# Patient Record
Sex: MALE | Race: WHITE | NOT HISPANIC OR LATINO | Employment: OTHER | URBAN - NONMETROPOLITAN AREA
[De-identification: names, ages, dates, MRNs, and addresses within clinical notes are randomized per-mention and may not be internally consistent; named-entity substitution may affect disease eponyms.]

---

## 2023-06-27 PROBLEM — G89.29 OTHER CHRONIC PAIN: Status: ACTIVE | Noted: 2023-06-27

## 2023-06-27 PROBLEM — I10 PRIMARY HYPERTENSION: Status: ACTIVE | Noted: 2023-06-27

## 2023-06-27 PROBLEM — N40.0 BENIGN PROSTATIC HYPERPLASIA WITHOUT LOWER URINARY TRACT SYMPTOMS: Status: ACTIVE | Noted: 2023-06-27

## 2023-06-27 PROBLEM — M05.79 RHEUMATOID ARTHRITIS INVOLVING MULTIPLE SITES WITH POSITIVE RHEUMATOID FACTOR: Status: ACTIVE | Noted: 2023-06-27

## 2023-07-13 ENCOUNTER — TELEPHONE (OUTPATIENT)
Dept: INTERNAL MEDICINE | Facility: CLINIC | Age: 58
End: 2023-07-13

## 2023-07-13 NOTE — TELEPHONE ENCOUNTER
Caller: DAVID RAI    Relationship: Emergency Contact    Best call back number: [phone]    What is the best time to reach you: ANYTIME    Who are you requesting to speak with (clinical staff, provider,  specific staff member): CLINICAL    What was the call regarding: STATES THAT PATIENT HAD A DRUG TEST FOR WHAT HE'S TAKING OVER THE COUNTER FOR HIS WORKOUT. WANTED TO SEND IMAGES OF MEDICATIONS HE'S TAKING OVER [employer], BUT DOES NOT HAVE OPTION TO. WANTED TO LOOK INTO OTHER ALTERNATIVES    Is it okay if the provider responds through [facility]: YES

## 2023-08-07 ENCOUNTER — TELEPHONE (OUTPATIENT)
Dept: INTERNAL MEDICINE | Facility: CLINIC | Age: 58
End: 2023-08-07

## 2023-08-07 DIAGNOSIS — Z79.899 ENCOUNTER FOR LONG-TERM (CURRENT) USE OF OTHER MEDICATIONS: Primary | ICD-10-CM

## 2023-08-07 NOTE — TELEPHONE ENCOUNTER
Caller: Lars Rand    Relationship: Self    Best call back number: [phone]     What form or medical record are you requesting: LETTER STATING WHY HE FAILED HIS DRUG SCREEN     Who is requesting this form or medical record from you: PATIENT    How would you like to receive the form or medical records (pick-up, mail, fax): FAX TO PAIN MANAGEMENT ([facility])  [phone]    Timeframe paperwork needed: ASAP    Additional notes: PATIENT STATED THAT PAIN MANAGEMENT WOULDN'T SCHEDULE HIS APPT UNTIL THEY HAD A LETTER HEAD NOTE SAYING WHY HE FAILED HIS DRUG TEST, PATIENT STATED HE FAILED DUE TO HIS ALLERGY AND COLD MEDICATION. PATIENT IS REQUESTING THIS BE DONE ASAP BECAUSE HE IS OUT OF MEDICATION.

## 2023-08-21 DIAGNOSIS — M05.79 RHEUMATOID ARTHRITIS INVOLVING MULTIPLE SITES WITH POSITIVE RHEUMATOID FACTOR: ICD-10-CM

## 2023-08-21 DIAGNOSIS — G89.29 OTHER CHRONIC PAIN: ICD-10-CM

## 2023-08-21 RX ORDER — OXYCODONE AND ACETAMINOPHEN 7.5; 325 MG/1; MG/1
1 TABLET ORAL EVERY 12 HOURS PRN
Qty: 60 TABLET | Refills: 0 | Status: SHIPPED | OUTPATIENT
Start: 2023-08-21

## 2023-08-22 DIAGNOSIS — G47.39 MIXED SLEEP APNEA: Primary | ICD-10-CM

## 2023-08-30 ENCOUNTER — TELEPHONE (OUTPATIENT)
Dept: NEUROLOGY | Age: 58
End: 2023-08-30

## 2023-08-30 NOTE — TELEPHONE ENCOUNTER
Received a referral for this patient. Called and left patient a VM to call our office back to where we can get him scheduled an appointment with Dr. Karlie Kumar.

## 2023-09-01 ENCOUNTER — TRANSCRIBE ORDERS (OUTPATIENT)
Dept: ADMINISTRATIVE | Facility: HOSPITAL | Age: 58
End: 2023-09-01
Payer: MEDICAID

## 2023-09-01 ENCOUNTER — HOSPITAL ENCOUNTER (OUTPATIENT)
Dept: GENERAL RADIOLOGY | Facility: HOSPITAL | Age: 58
Discharge: HOME | End: 2023-09-01
Admitting: PAIN MEDICINE
Payer: MEDICAID

## 2023-09-01 DIAGNOSIS — M47.812 CERVICAL SPONDYLOSIS WITHOUT MYELOPATHY: ICD-10-CM

## 2023-09-01 DIAGNOSIS — M47.812 CERVICAL SPONDYLOSIS WITHOUT MYELOPATHY: Primary | ICD-10-CM

## 2023-09-01 PROCEDURE — 72050 X-RAY EXAM NECK SPINE 4/5VWS: CPT

## 2023-10-12 ENCOUNTER — OFFICE VISIT (OUTPATIENT)
Dept: INTERNAL MEDICINE | Facility: CLINIC | Age: 58
End: 2023-10-12
Payer: MEDICAID

## 2023-10-12 VITALS
HEIGHT: 67 IN | TEMPERATURE: 97.1 F | HEART RATE: 73 BPM | BODY MASS INDEX: 30.35 KG/M2 | WEIGHT: 193.4 LBS | OXYGEN SATURATION: 96 % | DIASTOLIC BLOOD PRESSURE: 72 MMHG | SYSTOLIC BLOOD PRESSURE: 130 MMHG

## 2023-10-12 DIAGNOSIS — Z00.00 ENCOUNTER FOR ANNUAL PHYSICAL EXAM: Primary | ICD-10-CM

## 2023-10-12 DIAGNOSIS — G89.29 CHRONIC BILATERAL LOW BACK PAIN, UNSPECIFIED WHETHER SCIATICA PRESENT: ICD-10-CM

## 2023-10-12 DIAGNOSIS — G47.39 MIXED SLEEP APNEA: ICD-10-CM

## 2023-10-12 DIAGNOSIS — M54.50 CHRONIC BILATERAL LOW BACK PAIN, UNSPECIFIED WHETHER SCIATICA PRESENT: ICD-10-CM

## 2023-10-12 DIAGNOSIS — Z12.11 COLON CANCER SCREENING: ICD-10-CM

## 2023-10-12 DIAGNOSIS — Z79.52 LONG TERM SYSTEMIC STEROID USER: ICD-10-CM

## 2023-10-12 DIAGNOSIS — I10 PRIMARY HYPERTENSION: ICD-10-CM

## 2023-10-12 DIAGNOSIS — M05.79 RHEUMATOID ARTHRITIS INVOLVING MULTIPLE SITES WITH POSITIVE RHEUMATOID FACTOR: ICD-10-CM

## 2023-10-12 PROCEDURE — 1160F RVW MEDS BY RX/DR IN RCRD: CPT

## 2023-10-12 PROCEDURE — 1159F MED LIST DOCD IN RCRD: CPT

## 2023-10-12 PROCEDURE — 3075F SYST BP GE 130 - 139MM HG: CPT

## 2023-10-12 PROCEDURE — 99396 PREV VISIT EST AGE 40-64: CPT

## 2023-10-12 PROCEDURE — 3078F DIAST BP <80 MM HG: CPT

## 2023-10-12 RX ORDER — TIZANIDINE HYDROCHLORIDE 4 MG/1
4 CAPSULE, GELATIN COATED ORAL 3 TIMES DAILY
Qty: 42 CAPSULE | Refills: 0 | Status: SHIPPED | OUTPATIENT
Start: 2023-10-12 | End: 2023-10-26

## 2023-10-12 NOTE — PROGRESS NOTES
Subjective   Lars Rand is a 58 y.o. male.   Chief Complaint   Patient presents with    Annual Exam     Labs printed       History of Present Illness   Mr. Rand presents to the office today for his annual exam  He states since he was seen here last he has successfully weaned himself completely off of the prednisone.  He states his joints do hurt worse because of this however he has noted great improvement in his facial fat/edema, he states he never wants to get put back on steroids again.  Unfortunately, he is unable to get in with his rheumatologist again until January so he is not currently on anything for his rheumatoid.  He has been established with [facility], he states he has been very pleased with their care thus far, he states he is involved with physical therapy along with the Percocet that he was on previously for management of his chronic pain.  He states he has an MRI of his spine ordered on the 17th, he anticipates likely getting injections in his lower back after that.  He did recently complete a sleep study which did show some signs concerning for central sleep apnea as well as THU, he does have a CPAP machine now, states he is planning on returning it however because he cannot sleep with it on, states the mask always falls off.  No reports of any chest pain or shortness of breath.  He is requesting a form for a handicap place card, he states that at times his joints are so stiff and achy he cannot walk long distances and would appreciate being able to use this.  He denies any family history of colon cancer, states he would prefer to do Cologuard or colonoscopy for screening purposes.  He declines all vaccines, states his family always gets very sick when they get vaccines, he prefers to go without.  The following portions of the patient's history were reviewed and updated as appropriate: allergies, current medications, past family history, past medical history, past social  "history, past surgical history and problem list.    Review of Systems    Objective   Past Medical History:   Diagnosis Date    Arthritis     Chest pain     GERD (gastroesophageal reflux disease)     Heart attack     Hypertension     Palpitations     Rosie-Parkinson-White (WPW) pattern       History reviewed. No pertinent surgical history.     Current Outpatient Medications:     atenolol (Tenormin) 25 MG tablet, Take 1 tablet by mouth Daily., Disp: 90 tablet, Rfl: 1    diclofenac (VOLTAREN) 75 MG EC tablet, Take 1 tablet by mouth 2 (Two) Times a Day., Disp: 90 tablet, Rfl: 1    folic acid (FOLVITE) 1 MG tablet, Take 1 tablet by mouth Daily., Disp: 90 tablet, Rfl: 2    oxyCODONE-acetaminophen (PERCOCET) 7.5-325 MG per tablet, Take 1 tablet by mouth 2 (Two) Times a Day As Needed for Moderate Pain., Disp: , Rfl:     TiZANidine (ZANAFLEX) 4 MG capsule, Take 1 capsule by mouth 3 (Three) Times a Day for 14 days., Disp: 42 capsule, Rfl: 0      /72 (BP Location: Right arm, Patient Position: Sitting, Cuff Size: Adult)   Pulse 73   Temp 97.1 øF (36.2 øC) (Temporal)   Ht 170.2 cm (67\")   Wt 87.7 kg (193 lb 6.4 oz)   SpO2 96%   BMI 30.29 kg/mý      Body mass index is 30.29 kg/mý.          Physical Exam  Vitals and nursing note reviewed.   Constitutional:       General: He is not in acute distress.     Appearance: Normal appearance. He is overweight. He is not ill-appearing, toxic-appearing or diaphoretic.   HENT:      Head: Normocephalic and atraumatic.      Right Ear: There is impacted cerumen.      Left Ear: There is impacted cerumen.      Nose: Nose normal.      Mouth/Throat:      Mouth: Mucous membranes are moist.      Pharynx: Oropharynx is clear. No oropharyngeal exudate or posterior oropharyngeal erythema.   Eyes:      Extraocular Movements: Extraocular movements intact.      Conjunctiva/sclera: Conjunctivae normal.      Pupils: Pupils are equal, round, and reactive to light.   Neck:      Thyroid: No thyroid " mass, thyromegaly or thyroid tenderness.      Vascular: No carotid bruit.   Cardiovascular:      Rate and Rhythm: Normal rate and regular rhythm.      Pulses: Normal pulses.      Heart sounds: Normal heart sounds.   Pulmonary:      Effort: Pulmonary effort is normal.      Breath sounds: Normal breath sounds.   Abdominal:      General: Bowel sounds are normal.      Palpations: Abdomen is soft.   Musculoskeletal:         General: Tenderness (Generalized joints, bilateral hand joint swelling and tenderness, lower back pain, bilateral hips, all chronic.) present. Normal range of motion.      Cervical back: Normal range of motion and neck supple.      Right lower leg: No edema.      Left lower leg: No edema.   Skin:     General: Skin is warm and dry.      Capillary Refill: Capillary refill takes less than 2 seconds.   Neurological:      General: No focal deficit present.      Mental Status: He is alert and oriented to person, place, and time. Mental status is at baseline.   Psychiatric:         Mood and Affect: Mood normal.         Behavior: Behavior normal.         Thought Content: Thought content normal.         Judgment: Judgment normal.               Assessment & Plan   Diagnoses and all orders for this visit:    1. Encounter for annual physical exam (Primary)    2. Primary hypertension    3. Chronic bilateral low back pain, unspecified whether sciatica present  -     TiZANidine (ZANAFLEX) 4 MG capsule; Take 1 capsule by mouth 3 (Three) Times a Day for 14 days.  Dispense: 42 capsule; Refill: 0    4. Colon cancer screening  -     Cologuard - Stool, Per Rectum; Future    5. Mixed sleep apnea    6. Rheumatoid arthritis involving multiple sites with positive rheumatoid factor  -     DEXA Bone Density Axial; Future    7. Long term systemic steroid user  -     DEXA Bone Density Axial; Future               Plan of care reviewed with Lupe Larry along with labs from July  When he initially establish care we proceeded with  annual lab work, overall everything was unremarkable, he did have a slight elevation in fasting blood glucose levels however was still on steroids at that time, overall his appearance has improved and he feels better with the steroids although his pain from his rheumatoid arthritis has been poorly controlled.  He is following with pain management as mentioned.  Is going to be seeing rheumatology again in January and they will hopefully be able to offer other therapy options for him.  Given his history of long-term steroid use and having rheumatoid arthritis I do believe that a bone density scan is warranted, he is accepting of this.  Discussed colorectal cancer screening he prefers to do Cologuard, denies family history of colon cancer, denies any current gastrointestinal issues or complaints  We discussed the importance of remaining compliant with use of CPAP for treatment of sleep apnea, he is going to be seeing Dr. Tucker later this year for further evaluation and management of what may be also centralized sleep apnea.  I have advised that he try different masks out before completely giving up on CPAP.  Blood pressure today is 130/72, recommend continuing with current management for hypertension  Advised continued adherence to nutrient dense diet and adequate levels of physical exercise, currently working with physical therapy.  He declines all vaccines today  Recommend continuing with orthodontic and ophthalmology routine screenings  Utilize sunscreen when exposed to prolonged sunlight  He also asks if we could try a different muscle relaxer, has been taking baclofen without any therapeutic effect, wants to try tizanidine, advised I would send a 2-week supply in if therapeutic needs to let me know and we will send in refills.  Unless otherwise indicated will return in 1 year for annual physical, he is aware he can be seen prior to this for acute issues or concerns.    Please note that portions of this note  were completed with a voice recognition program.     Electronically signed by HAYDER Guy, 10/12/23, 11:38 CDT.

## 2023-10-17 ENCOUNTER — CLINICAL SUPPORT (OUTPATIENT)
Dept: INTERNAL MEDICINE | Facility: CLINIC | Age: 58
End: 2023-10-17
Payer: MEDICAID

## 2023-10-17 DIAGNOSIS — H61.23 HEARING LOSS OF BOTH EARS DUE TO CERUMEN IMPACTION: Primary | ICD-10-CM

## 2023-10-17 NOTE — PROGRESS NOTES
Patient is here today for ear impaction and cerumen removal.  Patient's ears were irrigated with warm water and a large amount of cerumen was removed with a curette.  Patient tolerated well and left the clinic.

## 2023-10-23 DIAGNOSIS — M05.79 RHEUMATOID ARTHRITIS INVOLVING MULTIPLE SITES WITH POSITIVE RHEUMATOID FACTOR: ICD-10-CM

## 2023-10-23 RX ORDER — DICLOFENAC SODIUM 75 MG/1
75 TABLET, DELAYED RELEASE ORAL 2 TIMES DAILY
Qty: 180 TABLET | Refills: 3 | Status: SHIPPED | OUTPATIENT
Start: 2023-10-23

## 2023-10-24 ENCOUNTER — HOSPITAL ENCOUNTER (OUTPATIENT)
Dept: BONE DENSITY | Facility: HOSPITAL | Age: 58
Discharge: HOME | End: 2023-10-24
Payer: MEDICAID

## 2023-10-24 DIAGNOSIS — M05.79 RHEUMATOID ARTHRITIS INVOLVING MULTIPLE SITES WITH POSITIVE RHEUMATOID FACTOR: ICD-10-CM

## 2023-10-24 DIAGNOSIS — Z79.52 LONG TERM SYSTEMIC STEROID USER: ICD-10-CM

## 2023-10-24 PROCEDURE — 77080 DXA BONE DENSITY AXIAL: CPT

## 2023-10-24 NOTE — PROGRESS NOTES
Bone density scan shows signs of osteopenia -bone weakening. 10 year fracture risk is decreased now that he is off of the steroids, currently estimated risk is 7.7%.  At this time I would recommend continuing with adequate calcium intake, supplementation of 500 mg daily over-the-counter would be appropriate along with current vitamin D supplementation (if taking) vitamin D level was normal we checked it recently.  I would recommend continuing with current management and repeating DEXA scan in 2 years.

## 2023-11-08 DIAGNOSIS — G89.29 CHRONIC BILATERAL LOW BACK PAIN, UNSPECIFIED WHETHER SCIATICA PRESENT: ICD-10-CM

## 2023-11-08 DIAGNOSIS — M05.79 RHEUMATOID ARTHRITIS INVOLVING MULTIPLE SITES WITH POSITIVE RHEUMATOID FACTOR: ICD-10-CM

## 2023-11-08 DIAGNOSIS — M54.50 CHRONIC BILATERAL LOW BACK PAIN, UNSPECIFIED WHETHER SCIATICA PRESENT: ICD-10-CM

## 2023-11-08 RX ORDER — DICLOFENAC SODIUM 75 MG/1
75 TABLET, DELAYED RELEASE ORAL 2 TIMES DAILY
Qty: 180 TABLET | Refills: 3 | Status: SHIPPED | OUTPATIENT
Start: 2023-11-08

## 2023-11-08 NOTE — TELEPHONE ENCOUNTER
Caller: ALEX RAIELIAS    Relationship: Emergency Contact    Best call back number: [phone] ALTERNATE #  Requested Prescriptions:          diclofenac (VOLTAREN) 75 MG EC tablet        Pharmacy where request should be sent:      [facility] - [address] [phone] [facility] [phone]      Last office visit with prescribing clinician: 10/12/2023   Last telemedicine visit with prescribing clinician: Visit date not found   Next office visit with prescribing clinician: 10/14/2024     Additional details provided by patient: OUT OF MEDICATION     Does the patient have less than a 3 day supply:  [x] Yes  [] No        David Pérez Rep   11/08/23 09:28 CST

## 2023-11-09 ENCOUNTER — TELEPHONE (OUTPATIENT)
Dept: INTERNAL MEDICINE | Facility: CLINIC | Age: 58
End: 2023-11-09

## 2023-11-09 DIAGNOSIS — N52.9 ERECTILE DYSFUNCTION, UNSPECIFIED ERECTILE DYSFUNCTION TYPE: Primary | ICD-10-CM

## 2023-11-09 DIAGNOSIS — N52.9 ERECTILE DYSFUNCTION, UNSPECIFIED ERECTILE DYSFUNCTION TYPE: ICD-10-CM

## 2023-11-09 RX ORDER — TIZANIDINE 4 MG/1
TABLET ORAL
Qty: 42 TABLET | Refills: 0 | Status: SHIPPED | OUTPATIENT
Start: 2023-11-09

## 2023-11-09 RX ORDER — SILDENAFIL 25 MG/1
25 TABLET, FILM COATED ORAL DAILY PRN
Qty: 9 TABLET | Refills: 1 | Status: SHIPPED | OUTPATIENT
Start: 2023-11-09

## 2023-11-09 RX ORDER — SILDENAFIL 25 MG/1
25 TABLET, FILM COATED ORAL DAILY PRN
Qty: 9 TABLET | Refills: 1 | Status: SHIPPED | OUTPATIENT
Start: 2023-11-09 | End: 2023-11-09 | Stop reason: SDUPTHER

## 2023-11-09 NOTE — TELEPHONE ENCOUNTER
Caller: FREDI HERNANDEZ     Relationship: SPOUSE     Best call back number: [phone]  [phone]    Requested Prescriptions:   STATES ED MEDICATION -DOES NOT KNOW THE NAME OF THE MEDICATION        Pharmacy where request should be sent:  [facility] [address] [place] [address] - [phone]  - [phone]  [phone]     Last office visit with prescribing clinician: 10/12/2023   Last telemedicine visit with prescribing clinician: Visit date not found   Next office visit with prescribing clinician: 10/14/2024     Additional details provided by patient: NONE     Does the patient have less than a 3 day supply:  [x] Yes  [] No    Would you like a call back once the refill request has been completed: [x] Yes [] No    If the office needs to give you a call back, can they leave a voicemail: [x] Yes [] No    [employer] Rep   11/09/23 10:25 CST

## 2023-12-06 DIAGNOSIS — G89.29 CHRONIC BILATERAL LOW BACK PAIN, UNSPECIFIED WHETHER SCIATICA PRESENT: ICD-10-CM

## 2023-12-06 DIAGNOSIS — M54.50 CHRONIC BILATERAL LOW BACK PAIN, UNSPECIFIED WHETHER SCIATICA PRESENT: ICD-10-CM

## 2023-12-07 RX ORDER — TIZANIDINE 4 MG/1
4 TABLET ORAL EVERY 8 HOURS PRN
Qty: 90 TABLET | Refills: 1 | Status: SHIPPED | OUTPATIENT
Start: 2023-12-07

## 2023-12-12 ENCOUNTER — TELEPHONE (OUTPATIENT)
Dept: NEUROLOGY | Age: 58
End: 2023-12-12

## 2023-12-12 NOTE — TELEPHONE ENCOUNTER
Tried calling patient to see if he can come in at 1pm for his appointment with Dr. Alejandra Morrell today. Called home number and it is not working. Looked at patient referral and patient phone number was not updated. I have called the phone number that is listed on the referral. NO answer No VM.

## 2023-12-22 DIAGNOSIS — M05.79 RHEUMATOID ARTHRITIS INVOLVING MULTIPLE SITES WITH POSITIVE RHEUMATOID FACTOR: ICD-10-CM

## 2023-12-22 DIAGNOSIS — I10 PRIMARY HYPERTENSION: ICD-10-CM

## 2023-12-22 RX ORDER — ATENOLOL 25 MG/1
25 TABLET ORAL DAILY
Qty: 90 TABLET | Refills: 3 | Status: SHIPPED | OUTPATIENT
Start: 2023-12-22

## 2023-12-22 RX ORDER — BACLOFEN 10 MG/1
10 TABLET ORAL 3 TIMES DAILY
Qty: 180 TABLET | Refills: 2 | OUTPATIENT
Start: 2023-12-22

## 2023-12-27 DIAGNOSIS — M05.79 RHEUMATOID ARTHRITIS INVOLVING MULTIPLE SITES WITH POSITIVE RHEUMATOID FACTOR: ICD-10-CM

## 2023-12-27 RX ORDER — BACLOFEN 10 MG/1
10 TABLET ORAL 3 TIMES DAILY
Qty: 180 TABLET | Refills: 2 | OUTPATIENT
Start: 2023-12-27

## 2023-12-27 RX ORDER — OXYCODONE AND ACETAMINOPHEN 7.5; 325 MG/1; MG/1
1 TABLET ORAL 2 TIMES DAILY PRN
OUTPATIENT
Start: 2023-12-27

## 2024-03-21 RX ORDER — FOLIC ACID 1 MG/1
1000 TABLET ORAL DAILY
Qty: 90 TABLET | Refills: 2 | Status: SHIPPED | OUTPATIENT
Start: 2024-03-21

## 2024-03-26 DIAGNOSIS — M05.79 RHEUMATOID ARTHRITIS INVOLVING MULTIPLE SITES WITH POSITIVE RHEUMATOID FACTOR: ICD-10-CM

## 2024-03-26 RX ORDER — BACLOFEN 10 MG/1
10 TABLET ORAL 3 TIMES DAILY
Qty: 180 TABLET | Refills: 2 | OUTPATIENT
Start: 2024-03-26

## 2024-07-11 ENCOUNTER — TELEPHONE (OUTPATIENT)
Dept: INTERNAL MEDICINE | Facility: CLINIC | Age: 59
End: 2024-07-11
Payer: MEDICAID

## 2024-07-11 NOTE — TELEPHONE ENCOUNTER
NEEDING REFERRAL TO THE NEUROSURGEON FOR HIS NECK.  WANTING TO SEE IF THERE IS ANYTHING THEY CAN DO TO IMPROVE HIS SITUATION.

## 2024-07-12 ENCOUNTER — OFFICE VISIT (OUTPATIENT)
Dept: INTERNAL MEDICINE | Facility: CLINIC | Age: 59
End: 2024-07-12
Payer: MEDICAID

## 2024-07-12 VITALS
HEIGHT: 67 IN | BODY MASS INDEX: 28.85 KG/M2 | HEART RATE: 73 BPM | SYSTOLIC BLOOD PRESSURE: 124 MMHG | OXYGEN SATURATION: 97 % | WEIGHT: 183.8 LBS | DIASTOLIC BLOOD PRESSURE: 78 MMHG | TEMPERATURE: 97.6 F

## 2024-07-12 DIAGNOSIS — R20.0 NUMBNESS AND TINGLING OF LEFT UPPER AND LOWER EXTREMITY: ICD-10-CM

## 2024-07-12 DIAGNOSIS — M54.2 CHRONIC NECK PAIN: Primary | ICD-10-CM

## 2024-07-12 DIAGNOSIS — G89.29 CHRONIC NECK PAIN: Primary | ICD-10-CM

## 2024-07-12 DIAGNOSIS — M05.79 RHEUMATOID ARTHRITIS INVOLVING MULTIPLE SITES WITH POSITIVE RHEUMATOID FACTOR: ICD-10-CM

## 2024-07-12 DIAGNOSIS — M62.81 MUSCLE WEAKNESS OF LEFT UPPER EXTREMITY: ICD-10-CM

## 2024-07-12 DIAGNOSIS — M50.30 DEGENERATIVE DISC DISEASE, CERVICAL: ICD-10-CM

## 2024-07-12 DIAGNOSIS — R20.2 NUMBNESS AND TINGLING OF LEFT UPPER AND LOWER EXTREMITY: ICD-10-CM

## 2024-07-12 PROCEDURE — 1159F MED LIST DOCD IN RCRD: CPT

## 2024-07-12 PROCEDURE — 99214 OFFICE O/P EST MOD 30 MIN: CPT

## 2024-07-12 PROCEDURE — 3078F DIAST BP <80 MM HG: CPT

## 2024-07-12 PROCEDURE — 1160F RVW MEDS BY RX/DR IN RCRD: CPT

## 2024-07-12 PROCEDURE — 3074F SYST BP LT 130 MM HG: CPT

## 2024-07-12 PROCEDURE — 1125F AMNT PAIN NOTED PAIN PRSNT: CPT

## 2024-07-12 RX ORDER — METHOCARBAMOL 500 MG/1
500 TABLET, FILM COATED ORAL 3 TIMES DAILY PRN
Qty: 30 TABLET | Refills: 0 | Status: SHIPPED | OUTPATIENT
Start: 2024-07-12

## 2024-07-12 RX ORDER — HYDROXYCHLOROQUINE SULFATE 200 MG/1
400 TABLET, FILM COATED ORAL DAILY
COMMUNITY

## 2024-07-12 NOTE — PROGRESS NOTES
Subjective   Lars Rand is a 59 y.o. male.   Chief Complaint   Patient presents with    Med Refill     Viagra      Referral     Patient would like to discuss a referral for his ongoing neck pain.        History of Present Illness   Mr. Rand presents to the office today to discuss getting a referral to neurosurgery for ongoing chronic neck pain.  He reports history of injury in 2022 working with his employer, he explains that a truck would not start, he states he got underneath the truck to help with mechanical issues and the truck rolled over on top of him.  This is when neck pain initially began.  He does have rheumatoid arthritis and is continuing to follow with his rheumatologist Dr. Quijano, he reports he is now on  hydroxychloroquine 400 mg daily and this has seemed to help with RA pain somewhat.  He has been off of systemic steroid therapy for almost a year now.  Please see below for additional HPI, assessment, and plan.    The following portions of the patient's history were reviewed and updated as appropriate: allergies, current medications, past family history, past medical history, past social history, past surgical history and problem list.    Review of Systems    Objective   Past Medical History:   Diagnosis Date    Arthritis     Chest pain     GERD (gastroesophageal reflux disease)     Heart attack     Hypertension     Palpitations     Rosie-Parkinson-White (WPW) pattern       History reviewed. No pertinent surgical history.     Current Outpatient Medications:     atenolol (TENORMIN) 25 MG tablet, TAKE ONE TABLET BY MOUTH DAILY, Disp: 90 tablet, Rfl: 3    diclofenac (VOLTAREN) 75 MG EC tablet, Take 1 tablet by mouth 2 (Two) Times a Day., Disp: 180 tablet, Rfl: 3    folic acid (FOLVITE) 1 MG tablet, TAKE ONE TABLET BY MOUTH DAILY, Disp: 90 tablet, Rfl: 2    sildenafil (VIAGRA) 25 MG tablet, Take 1 tablet by mouth Daily As Needed for Erectile Dysfunction., Disp: 9 tablet, Rfl: 1     "hydroxychloroquine (PLAQUENIL) 200 MG tablet, Take 2 tablets by mouth Daily. Indications: Rheumatoid Arthritis, Disp: , Rfl:     methocarbamol (ROBAXIN) 500 MG tablet, Take 1 tablet by mouth 3 (Three) Times a Day As Needed for Muscle Spasms., Disp: 30 tablet, Rfl: 0      /78 (BP Location: Left arm, Patient Position: Sitting, Cuff Size: Adult)   Pulse 73   Temp 97.6 °F (36.4 °C) (Infrared)   Ht 170.2 cm (67\")   Wt 83.4 kg (183 lb 12.8 oz)   SpO2 97%   BMI 28.79 kg/m²      Body mass index is 28.79 kg/m².         Physical Exam  Vitals and nursing note reviewed.   Constitutional:       General: He is not in acute distress.     Appearance: Normal appearance. He is overweight. He is not ill-appearing, toxic-appearing or diaphoretic.   HENT:      Head: Normocephalic and atraumatic.   Eyes:      Extraocular Movements: Extraocular movements intact.      Conjunctiva/sclera: Conjunctivae normal.      Pupils: Pupils are equal, round, and reactive to light.   Cardiovascular:      Rate and Rhythm: Normal rate and regular rhythm.      Pulses: Normal pulses.      Heart sounds: Normal heart sounds.   Pulmonary:      Effort: Pulmonary effort is normal.      Breath sounds: Normal breath sounds.   Musculoskeletal:         General: Tenderness (neck chronic) present.      Cervical back: Rigidity, spasms (to bilateral shoulder L>R) and tenderness present. No swelling, edema, deformity, erythema, signs of trauma, lacerations, bony tenderness or crepitus. Pain with movement present. Decreased range of motion.   Lymphadenopathy:      Cervical: No cervical adenopathy.   Skin:     General: Skin is warm and dry.   Neurological:      General: No focal deficit present.      Mental Status: He is alert and oriented to person, place, and time. Mental status is at baseline.      Sensory: Sensory deficit (when filament used did not report sensation to left upper) present.      Motor: Weakness (left upper extremity > right,  weak as " "well with L weaker thanR) present.   Psychiatric:         Mood and Affect: Mood normal.         Behavior: Behavior normal.         Thought Content: Thought content normal.         Judgment: Judgment normal.               Assessment & Plan   Diagnoses and all orders for this visit:    1. Chronic neck pain (Primary)  -     methocarbamol (ROBAXIN) 500 MG tablet; Take 1 tablet by mouth 3 (Three) Times a Day As Needed for Muscle Spasms.  Dispense: 30 tablet; Refill: 0    2. Degenerative disc disease, cervical    3. Muscle weakness of left upper extremity    4. Numbness and tingling of left upper and lower extremity    5. Rheumatoid arthritis involving multiple sites with positive rheumatoid factor               Plan of care reviewed with Mr. Rand  He presents the office today requesting a referral to neurosurgery for further evaluation and management of chronic neck pain.    We discussed previous interventions that have thus far been tried including:  PM -he was referred to pain management when he initially establish care at this office, had been following with [facility], he does report he was discharged from the practice because he was on vacation and could not make it in for the designated date for pill count.  He states he would prefer not to return to pain management since all they seem to do is \"herd people through like cattle\" and \"do not actually care\" and treatment is \"just and Band-Aid\".  He was being prescribed Percocet and also reports had injections to his neck x 2, he reports injections were therapeutic however not long-lasting, averaging 2 to 3 weeks of relief.    PT -reports when he initially established with pain management last year he did also participate in physical therapy at [facility] in [facility] -reports he received minimal benefit from this.  Medications -continues to take diclofenac daily, does utilize over-the-counter acetaminophen, reports subtherapeutic response to " "baclofen, tizanidine.  Other forms of analgesia include heat/cold application, stretching, and rest.  Per report all of these interventions are moderately therapeutic.  He would be interested in trying a different muscle relaxer specifically at night to help him rest better, does report poor sleep quality related to his neck pain.    He describes his neck pain as being bearable initially in the morning but by the time he wakes up, has his coffee and breakfast the pain severity increases and starts to spread to his shoulders.  He states he feels like the pain radiates more severely to the left shoulder as well as radiates down his left arm.  He confirms he has intermittent numbness and tingling in his bilateral upper extremities with the left being worse than the other.  He reports he is left-hand dominant.  On my examination of the muscles in bilateral upper extremities -less muscle tone is evident in left upper extremity when compared to right upper extremity.  Limited range of motion with extension and flexion of cervical spine as well as lateral rotation.   bilaterally are weak with the left being more weak than right.    The most recent imaging I am able to review of the cervical spine includes an x-ray from 9/1/2023, impression at that time noted, \"no acute osseous cervical spine abnormality, no fracture.  No evidence of spondylolithiasis or dynamic instability with flexion and extension.  Degenerative changes, most advanced at C5-6 and C6-7\".     He reports pain management provider did order MRI imaging of cervical spine while he was an established patient there, he reports he had this completed at [facility].  I advised we would like records of this and neurosurgery will most certainly want to review imaging themselves, I have advised that he proceed with contacting [facility] radiology to obtain a copy of the disc as well as the report so that he is repaired once he does established with " neurosurgery.  He is aware he can anticipate waiting for an appointment at least 6 weeks typically -in the interim please let me know if pain worsens or new symptoms occur.    He will continue to follow with rheumatology for management of rheumatoid arthritis.    Please keep next scheduled appointment on 10/14/2024 which should be annual physical, prn visits prior.     Please note that portions of this note were completed with a voice recognition program.     Electronically signed by HAYDER Guy, 07/12/24, 11:38 CDT.

## 2024-07-17 ENCOUNTER — PATIENT ROUNDING (BHMG ONLY) (OUTPATIENT)
Dept: INTERNAL MEDICINE | Facility: CLINIC | Age: 59
End: 2024-07-17
Payer: MEDICAID

## 2024-07-17 ENCOUNTER — OFFICE VISIT (OUTPATIENT)
Dept: INTERNAL MEDICINE | Facility: CLINIC | Age: 59
End: 2024-07-17
Payer: MEDICAID

## 2024-07-17 VITALS
SYSTOLIC BLOOD PRESSURE: 138 MMHG | TEMPERATURE: 98 F | WEIGHT: 184 LBS | BODY MASS INDEX: 28.88 KG/M2 | DIASTOLIC BLOOD PRESSURE: 85 MMHG | OXYGEN SATURATION: 98 % | HEIGHT: 67 IN | HEART RATE: 69 BPM

## 2024-07-17 DIAGNOSIS — M50.30 DEGENERATIVE DISC DISEASE, CERVICAL: Primary | ICD-10-CM

## 2024-07-17 DIAGNOSIS — Z79.899 LONG-TERM USE OF HIGH-RISK MEDICATION: ICD-10-CM

## 2024-07-17 DIAGNOSIS — M05.79 RHEUMATOID ARTHRITIS INVOLVING MULTIPLE SITES WITH POSITIVE RHEUMATOID FACTOR: ICD-10-CM

## 2024-07-17 PROCEDURE — 1159F MED LIST DOCD IN RCRD: CPT | Performed by: FAMILY MEDICINE

## 2024-07-17 PROCEDURE — 1160F RVW MEDS BY RX/DR IN RCRD: CPT | Performed by: FAMILY MEDICINE

## 2024-07-17 PROCEDURE — 3079F DIAST BP 80-89 MM HG: CPT | Performed by: FAMILY MEDICINE

## 2024-07-17 PROCEDURE — 1125F AMNT PAIN NOTED PAIN PRSNT: CPT | Performed by: FAMILY MEDICINE

## 2024-07-17 PROCEDURE — 99214 OFFICE O/P EST MOD 30 MIN: CPT | Performed by: FAMILY MEDICINE

## 2024-07-17 PROCEDURE — 3075F SYST BP GE 130 - 139MM HG: CPT | Performed by: FAMILY MEDICINE

## 2024-07-17 RX ORDER — OXYCODONE AND ACETAMINOPHEN 7.5; 325 MG/1; MG/1
1 TABLET ORAL EVERY 8 HOURS PRN
Qty: 90 TABLET | Refills: 0 | Status: SHIPPED | OUTPATIENT
Start: 2024-07-17

## 2024-07-17 RX ORDER — OXYCODONE AND ACETAMINOPHEN 7.5; 325 MG/1; MG/1
1 TABLET ORAL EVERY 12 HOURS PRN
COMMUNITY
Start: 2015-06-16 | End: 2024-07-17

## 2024-07-17 NOTE — PROGRESS NOTES
July 17, 2024    Hello, may I speak with Lars Rand?    My name is ALTAGRACIA      I am  with [facility]  [address] [place] [address] [phone].    Before we get started may I verify your date of birth? 1965    I am calling to officially welcome you to our practice and ask about your recent visit. Is this a good time to talk? yes    Tell me about your visit with us. What things went well?  Pt stated he really liked Dr Lehman, he's going to work out great.  He's easy to talk to, listens and makes you feel so comfortable.         We're always looking for ways to make our patients' experiences even better. Do you have recommendations on ways we may improve?  no    Overall were you satisfied with your first visit to our practice? Yes.  The clinic is really nice and staff is great, makes you feel comfortable.       I appreciate you taking the time to speak with me today. Is there anything else I can do for you? no      Thank you, and have a great day.

## 2024-07-17 NOTE — PROGRESS NOTES
Subjective     Chief Complaint   Patient presents with    Neck Injury    Back Pain    Shoulder Pain       History of Present Illness    Lars Rand is a 59 y.o. male who presents for a routine visit at this time.  Comes in secondary to chronic neck pain dates back to an accident which occurred in 2022 prior to that patient had also had a longstanding history of rheumatoid arthritis and had been following with Dr. Quijano.  Patient states that recently he had missed a pill count pain management and they had terminated him.  He states that he would like to get back on his pain medicine but states that previously his pain medicine had not been working as well as he would like he states that it did not last long enough as he was taking it twice a day.  Would like an additional dose to see if this would help.  Reviewed old records from his prior primary care provider.    Pain characteristics:  Current pain level : 5/10 as reported by patient.    Pain Description :  dull and ache. does radiate and occurs continuously.   Alleviating factors :   relaxation and pain medication  Aggravating factors : turning and bending  Associated Symptoms:  patient denies any problems        I have discussed with him the possibility of addiction, overdose, as well as the limited benefits and other risks of opioid use.  Controlled substance contract has been signed and criteria for stopping or continuing opiates has been discussed.  Patient's Edvin has been reviewed, and risk for diversion or abuse appears low.  Realistic goals have been set for pain reduction, and urine drug screen has been ordered for initiation to review for illicit drug use or undisclosed rx drug use.  Alternatives to opioid treatment have been tried and failed at this point.  We will follow up with this patient within 1 month to reassess current therapy.  Patient advised that PRN or as needed pain medication allows the patient to skip doses if not needed,  but not to be taken more often or at higher doses than prescribed.    Initial PEG scale:  1. Pain on average over the last week 5/10  2. Patient states that durring the past week that his pain has interfered with his enjoyment of life 5/10  with 10 bieng complete interference.  3. The patient also states that during the past week that that the pain has interfered with his general activity 5/10  with 10 bieng complete interference     Patient's PMR from outside medical facility reviewed and noted.    Current MME total: 33.75    Objective Radiological Findings:  Degenerative changes, most advanced at C5-6 and C6-7. On cervical spine xray, Possible partial fusion of the inferior sacroiliac joints on lumbar xray, positive RF    Trearment goals: reduced pain, Increased activities of daily living    History:   Duration of the patient's pain: since before 2010   Legal issues? NO   Current controlled medication and doses: percocet   Medications that have failed: nsaids, norco   Side effects of current medications: yes   Satisfaction with treatment: yes    Functional Status:   Able to feed self: YES   Able to bathe self: YES   Able to use the toilet independently: YES   Able to dress self: YES   Able to get up from bed or chair without assistance: yes    Adverse Events:   Constipation: no   Lethargy: NO     Aberrant Behavior:   Over dose: NO   Run out of medications early: NO   Last UDS consistent: yes   Taking medications as prescribed: YES     Past Medical History:   Past Medical History:   Diagnosis Date    Arthritis     Erectile dysfunction 04/2023    GERD (gastroesophageal reflux disease)     Heart attack     Hypertension     Low back pain     Rosie-Parkinson-White (WPW) pattern      Past Surgical History:  Past Surgical History:   Procedure Laterality Date    TONSILECTOMY, ADENOIDECTOMY, BILATERAL MYRINGOTOMY AND TUBES       Social History:  reports that he has quit smoking. His smoking use included cigarettes. He  started smoking about 47 years ago. He has never used smokeless tobacco. He reports current alcohol use of about 4.0 - 5.0 standard drinks of alcohol per week. He reports that he does not use drugs.    Family History: family history includes Arthritis in his father and mother; Dementia in his mother; Diabetes in his father and mother; Heart failure in his mother; Hypertension in his brother and father; Leukemia in his father; Other in his mother; Stroke in his mother; Thyroid cancer in his sister.      Allergies:  No Known Allergies  Medications:  Prior to Admission medications    Medication Sig Start Date End Date Taking? Authorizing Provider   atenolol (TENORMIN) 25 MG tablet TAKE ONE TABLET BY MOUTH DAILY 12/22/23  Yes Ashely Gupta APRN   diclofenac (VOLTAREN) 75 MG EC tablet Take 1 tablet by mouth 2 (Two) Times a Day. 11/8/23  Yes Ashely Gupta APRN   folic acid (FOLVITE) 1 MG tablet TAKE ONE TABLET BY MOUTH DAILY 3/21/24  Yes Ashely Gupta APRN   hydroxychloroquine (PLAQUENIL) 200 MG tablet Take 2 tablets by mouth Daily. Indications: Rheumatoid Arthritis   Yes Lindsay Melgar MD   methocarbamol (ROBAXIN) 500 MG tablet Take 1 tablet by mouth 3 (Three) Times a Day As Needed for Muscle Spasms. 7/12/24  Yes Ashely Gupta APRN   oxyCODONE-acetaminophen (Percocet) 7.5-325 MG per tablet Take 1 tablet by mouth Every 12 (Twelve) Hours As Needed. 6/16/15  Yes Lindsay Melgar MD   sildenafil (VIAGRA) 25 MG tablet Take 1 tablet by mouth Daily As Needed for Erectile Dysfunction. 11/9/23  Yes Ashely Gupta APRN       NILDA: Over the last two weeks, how often have you been bothered by the following problems?  Feeling nervous, anxious or on edge: Not at all  Not being able to stop or control worrying: Not at all  Worrying too much about different things: Not at all  Trouble Relaxing: Not at all  Being so restless that it is hard to sit still: Not at all  Becoming easily annoyed or  "irritable: Not at all  Feeling afraid as if something awful might happen: Not at all  NILDA 7 Total Score: 0  If you checked any problems, how difficult have these problems made it for you to do your work, take care of things at home, or get along with other people: Not difficult at all      PHQ-9 Depression Screening  Little interest or pleasure in doing things? 0-->not at all   Feeling down, depressed, or hopeless? 0-->not at all   Trouble falling or staying asleep, or sleeping too much?     Feeling tired or having little energy?     Poor appetite or overeating?     Feeling bad about yourself - or that you are a failure or have let yourself or your family down?     Trouble concentrating on things, such as reading the newspaper or watching television?     Moving or speaking so slowly that other people could have noticed? Or the opposite - being so fidgety or restless that you have been moving around a lot more than usual?     Thoughts that you would be better off dead, or of hurting yourself in some way?     PHQ-9 Total Score 0   If you checked off any problems, how difficult have these problems made it for you to do your work, take care of things at home, or get along with other people?         PHQ-9 Total Score: 0   0 (Negative screening for depression)  Support given, observe for worsening symptoms    Review of systems   negative unless otherwise specified above in HPI    Objective     Vital Signs: /85 (BP Location: Right arm, Patient Position: Sitting, Cuff Size: Adult)   Pulse 69   Temp 98 °F (36.7 °C) (Infrared)   Ht 170.2 cm (67\")   Wt 83.5 kg (184 lb)   SpO2 98%   BMI 28.82 kg/m²     Physical Exam  Vitals and nursing note reviewed.   Constitutional:       General: He is not in acute distress.     Appearance: Normal appearance.   HENT:      Head: Normocephalic.   Eyes:      Extraocular Movements: Extraocular movements intact.      Pupils: Pupils are equal, round, and reactive to light. "   Cardiovascular:      Rate and Rhythm: Normal rate and regular rhythm.      Heart sounds: Normal heart sounds. No murmur heard.  Pulmonary:      Effort: Pulmonary effort is normal. No respiratory distress.      Breath sounds: Normal breath sounds. No rhonchi or rales.   Abdominal:      General: Abdomen is flat. Bowel sounds are normal.      Palpations: Abdomen is soft.   Neurological:      General: No focal deficit present.      Mental Status: He is alert.         BMI is >= 25 and <30. (Overweight) The following options were offered after discussion;: weight loss educational material (shared in after visit summary)             Results Reviewed:  Glucose   Date Value Ref Range Status   07/11/2023 107 (H) 65 - 99 mg/dL Final     BUN   Date Value Ref Range Status   07/11/2023 16 6 - 20 mg/dL Final   06/17/2022 39 (H) 6 - 20 mg/dL Final     Creatinine   Date Value Ref Range Status   07/11/2023 1.24 0.76 - 1.27 mg/dL Final   06/17/2022 1.23 0.80 - 1.30 mg/dL Final     Sodium   Date Value Ref Range Status   07/11/2023 141 136 - 145 mmol/L Final   06/17/2022 134 (L) 136 - 144 mmol/L Final     Potassium   Date Value Ref Range Status   07/11/2023 4.6 3.5 - 5.2 mmol/L Final   06/17/2022 4.6 3.5 - 5.1 mmol/L Final     Chloride   Date Value Ref Range Status   07/11/2023 102 98 - 107 mmol/L Final   06/17/2022 105 100 - 110 mmol/L Final     Total CO2   Date Value Ref Range Status   07/11/2023 27.0 22.0 - 29.0 mmol/L Final     Calcium   Date Value Ref Range Status   07/11/2023 9.6 8.6 - 10.5 mg/dL Final   06/17/2022 8.6 (L) 8.9 - 10.3 mg/dL Final     ALT (SGPT)   Date Value Ref Range Status   07/11/2023 16 1 - 41 U/L Final   06/16/2022 11 <=41 U/L Final     AST (SGOT)   Date Value Ref Range Status   07/11/2023 13 1 - 40 U/L Final   06/16/2022 13 <=40 U/L Final     WBC   Date Value Ref Range Status   07/11/2023 7.33 3.40 - 10.80 10*3/mm3 Final     Hematocrit   Date Value Ref Range Status   07/11/2023 40.9 37.5 - 51.0 % Final      Platelets   Date Value Ref Range Status   07/11/2023 301 140 - 450 10*3/mm3 Final     Triglycerides   Date Value Ref Range Status   07/11/2023 90 0 - 150 mg/dL Final     HDL Cholesterol   Date Value Ref Range Status   07/11/2023 53 40 - 60 mg/dL Final     LDL Chol Calc ([facility])   Date Value Ref Range Status   07/11/2023 69 0 - 100 mg/dL Final     The following data was reviewed by: Óscar Lehman MD on 07/17/2024:    Data reviewed : Radiologic studies xrays cervical spine, lumbar spine        Assessment / Plan     Assessment/Plan:   Diagnosis Plan   1. Degenerative disc disease, cervical  oxyCODONE-acetaminophen (Percocet) 7.5-325 MG per tablet      2. Rheumatoid arthritis involving multiple sites with positive rheumatoid factor  oxyCODONE-acetaminophen (Percocet) 7.5-325 MG per tablet      3. Long-term use of high-risk medication  Pain Management Profile (13 Drugs) Urine - Urine, Clean Catch            Return in about 4 weeks (around 8/14/2024), or if symptoms worsen or fail to improve, for Recheck. unless patient needs to be seen sooner or acute issues arise.      I have discussed the patient results/orders and and plan/recommendation with them at today's visit.      Signed by:    Óscar Lehman MD Date: 07/17/24

## 2024-07-18 LAB
AMPHETAMINES UR QL SCN: NEGATIVE NG/ML
BARBITURATES UR QL SCN: NEGATIVE NG/ML
BENZODIAZ UR QL SCN: NEGATIVE NG/ML
BZE UR QL SCN: NEGATIVE NG/ML
CANNABINOIDS UR QL SCN: NEGATIVE NG/ML
CREAT UR-MCNC: 100.7 MG/DL (ref 20–300)
FENTANYL UR-MCNC: NEGATIVE PG/ML
LABORATORY COMMENT REPORT: NORMAL
MEPERIDINE UR QL: NEGATIVE NG/ML
METHADONE UR QL SCN: NEGATIVE NG/ML
OPIATES UR QL SCN: NEGATIVE NG/ML
OXYCODONE+OXYMORPHONE UR QL SCN: NEGATIVE NG/ML
PCP UR QL: NEGATIVE NG/ML
PH UR: 5.2 [PH] (ref 4.5–8.9)
PROPOXYPH UR QL SCN: NEGATIVE NG/ML
SP GR UR: 1.02
TRAMADOL UR QL SCN: NEGATIVE NG/ML

## 2024-08-14 ENCOUNTER — OFFICE VISIT (OUTPATIENT)
Dept: INTERNAL MEDICINE | Facility: CLINIC | Age: 59
End: 2024-08-14
Payer: MEDICAID

## 2024-08-14 VITALS
TEMPERATURE: 98.7 F | HEART RATE: 70 BPM | WEIGHT: 185 LBS | DIASTOLIC BLOOD PRESSURE: 85 MMHG | SYSTOLIC BLOOD PRESSURE: 132 MMHG | OXYGEN SATURATION: 98 % | BODY MASS INDEX: 29.03 KG/M2 | HEIGHT: 67 IN

## 2024-08-14 DIAGNOSIS — Z79.899 LONG-TERM USE OF HIGH-RISK MEDICATION: ICD-10-CM

## 2024-08-14 DIAGNOSIS — N52.9 ERECTILE DYSFUNCTION, UNSPECIFIED ERECTILE DYSFUNCTION TYPE: ICD-10-CM

## 2024-08-14 DIAGNOSIS — I10 PRIMARY HYPERTENSION: ICD-10-CM

## 2024-08-14 DIAGNOSIS — M05.79 RHEUMATOID ARTHRITIS INVOLVING MULTIPLE SITES WITH POSITIVE RHEUMATOID FACTOR: ICD-10-CM

## 2024-08-14 DIAGNOSIS — F51.01 PRIMARY INSOMNIA: Primary | ICD-10-CM

## 2024-08-14 DIAGNOSIS — M54.2 CHRONIC NECK PAIN: ICD-10-CM

## 2024-08-14 DIAGNOSIS — M50.30 DEGENERATIVE DISC DISEASE, CERVICAL: ICD-10-CM

## 2024-08-14 DIAGNOSIS — G89.29 CHRONIC NECK PAIN: ICD-10-CM

## 2024-08-14 PROCEDURE — 3079F DIAST BP 80-89 MM HG: CPT | Performed by: FAMILY MEDICINE

## 2024-08-14 PROCEDURE — 1160F RVW MEDS BY RX/DR IN RCRD: CPT | Performed by: FAMILY MEDICINE

## 2024-08-14 PROCEDURE — 1159F MED LIST DOCD IN RCRD: CPT | Performed by: FAMILY MEDICINE

## 2024-08-14 PROCEDURE — 3075F SYST BP GE 130 - 139MM HG: CPT | Performed by: FAMILY MEDICINE

## 2024-08-14 PROCEDURE — 99214 OFFICE O/P EST MOD 30 MIN: CPT | Performed by: FAMILY MEDICINE

## 2024-08-14 PROCEDURE — 1125F AMNT PAIN NOTED PAIN PRSNT: CPT | Performed by: FAMILY MEDICINE

## 2024-08-14 RX ORDER — FOLIC ACID 1 MG/1
1000 TABLET ORAL DAILY
Qty: 90 TABLET | Refills: 3 | Status: SHIPPED | OUTPATIENT
Start: 2024-08-14

## 2024-08-14 RX ORDER — SILDENAFIL 100 MG/1
100 TABLET, FILM COATED ORAL DAILY PRN
Qty: 30 TABLET | Refills: 3 | Status: SHIPPED | OUTPATIENT
Start: 2024-08-14

## 2024-08-14 RX ORDER — ATENOLOL 25 MG/1
25 TABLET ORAL DAILY
Qty: 90 TABLET | Refills: 3 | Status: SHIPPED | OUTPATIENT
Start: 2024-08-14

## 2024-08-14 RX ORDER — TRAZODONE HYDROCHLORIDE 50 MG/1
50 TABLET ORAL NIGHTLY
Qty: 30 TABLET | Refills: 11 | Status: SHIPPED | OUTPATIENT
Start: 2024-08-14

## 2024-08-14 RX ORDER — HYDROXYCHLOROQUINE SULFATE 200 MG/1
400 TABLET, FILM COATED ORAL DAILY
Qty: 180 TABLET | Refills: 3 | Status: SHIPPED | OUTPATIENT
Start: 2024-08-14

## 2024-08-14 RX ORDER — DICLOFENAC SODIUM 75 MG/1
75 TABLET, DELAYED RELEASE ORAL 2 TIMES DAILY
Qty: 180 TABLET | Refills: 3 | Status: SHIPPED | OUTPATIENT
Start: 2024-08-14

## 2024-08-14 RX ORDER — OXYCODONE AND ACETAMINOPHEN 7.5; 325 MG/1; MG/1
1 TABLET ORAL EVERY 8 HOURS PRN
Qty: 90 TABLET | Refills: 0 | Status: SHIPPED | OUTPATIENT
Start: 2024-08-14

## 2024-08-14 NOTE — PROGRESS NOTES
Subjective     Chief Complaint   Patient presents with    Neck Pain    Back Pain    Shoulder Pain       History of Present Illness    Lars Rand is a 59 y.o. male who presents for a routine visit at this time.  Comes in secondary to chronic neck pain dates back to an accident which occurred in 2022 prior to that patient had also had a longstanding history of rheumatoid arthritis and had been following with Dr. Quijano.  Patient states that recently he had missed a pill count pain management and they had terminated him.  He states that he would like to get back on his pain medicine but states that previously his pain medicine had not been working as well as he would like he states that it did not last long enough as he was taking it twice a day.  Would like an additional dose to see if this would help.  Reviewed old records from his prior primary care provider.    The current pain level reported by patient is 4/10.  The patient also states that during the past week that that the pain has interfered with his general activity 4/10  with 10 bieng complete interference.  Patient states that durring the past week that his pain has interfered with his enjoyment of life 4/10  with 10 bieng complete interference.  Pain on average over the Last week 4/10.  Patient states the pain is continuously .  Patient describes the pain as  numbness, dull, ache, tingling, and throbbing. does radiate.  Patient states that they have the following alleviating factors pain medication and lying down, and the following aggravating factors laying flat .  Patient reports the following associated symptoms with their pain patient denies any problems .       This patient relates that he can do more activities of daily living and is much more functional on his current dose of pain medicine than he could be otherwise.  We discussed his treatment plan at this time and he continues to be compliant with this and the controlled substance  contract.  The patient relates that the pain medicine continues to help significantly with his pain, and  states that he continues to benefit from the medication.  Alternative therapies including anti inflammatories have been tried in the past and he has found these to be ineffective, will continue him on opiate therapy for their chronic pain at this time. The patient states that he has had no side effects of his medication.  They likewise do not appear to have evidence of oversedation, confusion, slurred speech, or abnormal gait at this time.  The patients risk factors for increased risk of harm from opiates has been discussed and reviewed with him.   The patient states that he is not modifying his own regimen, and that he is not taking any other illicit substances not prescribed by this office.  The patient has never had an overdose and needed a rescue medication at this time.    Final Peg score: 4    Since patient had had to have a pill count at his prior pain management and was negative on his last urine drug screen due to having run out of meds we will recheck a urine drug screen today.    Needs refills on his other medications today states that his rheumatoid arthritis remains stable and well-controlled hypertension looks good today and has been doing well in the recent past.    Patient has a history of erectile dysfunction and does well with Viagra.    Patient has a history of insomnia up until now has tried various muscle relaxers with limited effect.  Patient would like to try something different at this time.    Patient otherwise states that he has had no new problems, complaints, or concerns at this time.       Current MME total: 33.75    Objective Radiological Findings:  Degenerative changes, most advanced at C5-6 and C6-7. On cervical spine xray, Possible partial fusion of the inferior sacroiliac joints on lumbar xray, positive RF    Trearment goals: reduced pain, Increased activities of daily  living    History:   Duration of the patient's pain: since before 2010   Legal issues? NO   Current controlled medication and doses: percocet   Medications that have failed: nsaids, norco   Side effects of current medications: yes   Satisfaction with treatment: yes    Functional Status:   Able to feed self: YES   Able to bathe self: YES   Able to use the toilet independently: YES   Able to dress self: YES   Able to get up from bed or chair without assistance: yes    Adverse Events:   Constipation: no   Lethargy: NO     Aberrant Behavior:   Over dose: NO   Run out of medications early: NO   Last UDS consistent: yes   Taking medications as prescribed: YES     Past Medical History:   Past Medical History:   Diagnosis Date    Arthritis     Erectile dysfunction 04/2023    GERD (gastroesophageal reflux disease)     Heart attack     Hypertension     Low back pain     Rosie-Parkinson-White (WPW) pattern      Past Surgical History:  Past Surgical History:   Procedure Laterality Date    TONSILECTOMY, ADENOIDECTOMY, BILATERAL MYRINGOTOMY AND TUBES       Social History:  reports that he quit smoking about 10 years ago. His smoking use included cigarettes. He started smoking about 47 years ago. He has a 37 pack-year smoking history. He has never used smokeless tobacco. He reports current alcohol use of about 4.0 - 5.0 standard drinks of alcohol per week. He reports that he does not use drugs.    Family History: family history includes Arthritis in his father and mother; Dementia in his mother; Diabetes in his father and mother; Heart failure in his mother; Hypertension in his brother and father; Leukemia in his father; Other in his mother; Stroke in his mother; Thyroid cancer in his sister.      Allergies:  No Known Allergies  Medications:  Prior to Admission medications    Medication Sig Start Date End Date Taking? Authorizing Provider   atenolol (TENORMIN) 25 MG tablet TAKE ONE TABLET BY MOUTH DAILY 12/22/23  Yes Candy  Ashely CAMPOS APRN   diclofenac (VOLTAREN) 75 MG EC tablet Take 1 tablet by mouth 2 (Two) Times a Day. 11/8/23  Yes Ashely Gupta APRTATYANA   folic acid (FOLVITE) 1 MG tablet TAKE ONE TABLET BY MOUTH DAILY 3/21/24  Yes Ashely Gupta APRN   hydroxychloroquine (PLAQUENIL) 200 MG tablet Take 2 tablets by mouth Daily. Indications: Rheumatoid Arthritis   Yes ProviderLindsay MD   methocarbamol (ROBAXIN) 500 MG tablet Take 1 tablet by mouth 3 (Three) Times a Day As Needed for Muscle Spasms. 7/12/24  Yes Ashely Gupta APRN   oxyCODONE-acetaminophen (Percocet) 7.5-325 MG per tablet Take 1 tablet by mouth Every 12 (Twelve) Hours As Needed. 6/16/15  Yes ProviderLindsay MD   sildenafil (VIAGRA) 25 MG tablet Take 1 tablet by mouth Daily As Needed for Erectile Dysfunction. 11/9/23  Yes Ashely Gupta APRN       NILDA:        PHQ-9 Depression Screening  Little interest or pleasure in doing things?     Feeling down, depressed, or hopeless?     Trouble falling or staying asleep, or sleeping too much?     Feeling tired or having little energy?     Poor appetite or overeating?     Feeling bad about yourself - or that you are a failure or have let yourself or your family down?     Trouble concentrating on things, such as reading the newspaper or watching television?     Moving or speaking so slowly that other people could have noticed? Or the opposite - being so fidgety or restless that you have been moving around a lot more than usual?     Thoughts that you would be better off dead, or of hurting yourself in some way?     PHQ-9 Total Score     If you checked off any problems, how difficult have these problems made it for you to do your work, take care of things at home, or get along with other people?         PHQ-9 Total Score:     0 (Negative screening for depression)  Support given, observe for worsening symptoms    Review of systems   negative unless otherwise specified above in HPI    Objective  "    Vital Signs: /85 (BP Location: Left arm, Patient Position: Sitting, Cuff Size: Adult)   Pulse 70   Temp 98.7 °F (37.1 °C) (Infrared)   Ht 170.2 cm (67\")   Wt 83.9 kg (185 lb)   SpO2 98%   BMI 28.98 kg/m²     Physical Exam  Vitals and nursing note reviewed.   Constitutional:       General: He is not in acute distress.     Appearance: Normal appearance.   HENT:      Head: Normocephalic.   Eyes:      Extraocular Movements: Extraocular movements intact.      Pupils: Pupils are equal, round, and reactive to light.   Cardiovascular:      Rate and Rhythm: Normal rate and regular rhythm.      Heart sounds: Normal heart sounds. No murmur heard.  Pulmonary:      Effort: Pulmonary effort is normal. No respiratory distress.      Breath sounds: Normal breath sounds. No rhonchi or rales.   Abdominal:      General: Abdomen is flat. Bowel sounds are normal.      Palpations: Abdomen is soft.   Neurological:      General: No focal deficit present.      Mental Status: He is alert.         BMI is >= 25 and <30. (Overweight) The following options were offered after discussion;: weight loss educational material (shared in after visit summary)             Results Reviewed:  Glucose   Date Value Ref Range Status   07/11/2023 107 (H) 65 - 99 mg/dL Final     BUN   Date Value Ref Range Status   07/11/2023 16 6 - 20 mg/dL Final   06/17/2022 39 (H) 6 - 20 mg/dL Final     Creatinine   Date Value Ref Range Status   07/11/2023 1.24 0.76 - 1.27 mg/dL Final   06/17/2022 1.23 0.80 - 1.30 mg/dL Final     Sodium   Date Value Ref Range Status   07/11/2023 141 136 - 145 mmol/L Final   06/17/2022 134 (L) 136 - 144 mmol/L Final     Potassium   Date Value Ref Range Status   07/11/2023 4.6 3.5 - 5.2 mmol/L Final   06/17/2022 4.6 3.5 - 5.1 mmol/L Final     Chloride   Date Value Ref Range Status   07/11/2023 102 98 - 107 mmol/L Final   06/17/2022 105 100 - 110 mmol/L Final     Total CO2   Date Value Ref Range Status   07/11/2023 27.0 22.0 - " 29.0 mmol/L Final     Calcium   Date Value Ref Range Status   07/11/2023 9.6 8.6 - 10.5 mg/dL Final   06/17/2022 8.6 (L) 8.9 - 10.3 mg/dL Final     ALT (SGPT)   Date Value Ref Range Status   07/11/2023 16 1 - 41 U/L Final   06/16/2022 11 <=41 U/L Final     AST (SGOT)   Date Value Ref Range Status   07/11/2023 13 1 - 40 U/L Final   06/16/2022 13 <=40 U/L Final     WBC   Date Value Ref Range Status   07/11/2023 7.33 3.40 - 10.80 10*3/mm3 Final     Hematocrit   Date Value Ref Range Status   07/11/2023 40.9 37.5 - 51.0 % Final     Platelets   Date Value Ref Range Status   07/11/2023 301 140 - 450 10*3/mm3 Final     Triglycerides   Date Value Ref Range Status   07/11/2023 90 0 - 150 mg/dL Final     HDL Cholesterol   Date Value Ref Range Status   07/11/2023 53 40 - 60 mg/dL Final     LDL Chol Calc ([facility])   Date Value Ref Range Status   07/11/2023 69 0 - 100 mg/dL Final     The following data was reviewed by: Óscar Lehman MD on 07/17/2024:    Data reviewed : Radiologic studies xrays cervical spine, lumbar spine        Assessment / Plan     Assessment/Plan:   Diagnosis Plan   1. Primary insomnia  traZODone (DESYREL) 50 MG tablet      2. Degenerative disc disease, cervical  oxyCODONE-acetaminophen (Percocet) 7.5-325 MG per tablet      3. Rheumatoid arthritis involving multiple sites with positive rheumatoid factor  oxyCODONE-acetaminophen (Percocet) 7.5-325 MG per tablet    diclofenac (VOLTAREN) 75 MG EC tablet    folic acid (FOLVITE) 1 MG tablet    hydroxychloroquine (PLAQUENIL) 200 MG tablet      4. Primary hypertension  atenolol (TENORMIN) 25 MG tablet      5. Chronic neck pain        6. Erectile dysfunction, unspecified erectile dysfunction type  sildenafil (VIAGRA) 100 MG tablet      7. Long-term use of high-risk medication  Pain Management Profile (13 Drugs) Urine - Urine, Clean Catch              No follow-ups on file. unless patient needs to be seen sooner or acute issues arise.      I have discussed the  patient results/orders and and plan/recommendation with them at today's visit.      Signed by:    Óscar Lehman MD Date: 08/14/24

## 2024-08-19 ENCOUNTER — OFFICE VISIT (OUTPATIENT)
Dept: NEUROSURGERY | Facility: CLINIC | Age: 59
End: 2024-08-19
Payer: MEDICAID

## 2024-08-19 ENCOUNTER — HOSPITAL ENCOUNTER (OUTPATIENT)
Dept: GENERAL RADIOLOGY | Facility: HOSPITAL | Age: 59
Discharge: HOME | End: 2024-08-19
Admitting: NURSE PRACTITIONER
Payer: MEDICAID

## 2024-08-19 VITALS — WEIGHT: 185 LBS | BODY MASS INDEX: 29.03 KG/M2 | HEIGHT: 67 IN

## 2024-08-19 DIAGNOSIS — F17.200 SMOKER: ICD-10-CM

## 2024-08-19 DIAGNOSIS — G89.29 CHRONIC LEFT SHOULDER PAIN: ICD-10-CM

## 2024-08-19 DIAGNOSIS — M48.02 SPINAL STENOSIS IN CERVICAL REGION: Primary | ICD-10-CM

## 2024-08-19 DIAGNOSIS — M25.512 CHRONIC LEFT SHOULDER PAIN: ICD-10-CM

## 2024-08-19 DIAGNOSIS — E66.3 OVERWEIGHT WITH BODY MASS INDEX (BMI) OF 28 TO 28.9 IN ADULT: ICD-10-CM

## 2024-08-19 DIAGNOSIS — M50.30 DEGENERATION OF CERVICAL INTERVERTEBRAL DISC: ICD-10-CM

## 2024-08-19 PROCEDURE — 72052 X-RAY EXAM NECK SPINE 6/>VWS: CPT

## 2024-08-19 PROCEDURE — 1160F RVW MEDS BY RX/DR IN RCRD: CPT | Performed by: NURSE PRACTITIONER

## 2024-08-19 PROCEDURE — 99214 OFFICE O/P EST MOD 30 MIN: CPT | Performed by: NURSE PRACTITIONER

## 2024-08-19 PROCEDURE — 1159F MED LIST DOCD IN RCRD: CPT | Performed by: NURSE PRACTITIONER

## 2024-08-19 PROCEDURE — 73030 X-RAY EXAM OF SHOULDER: CPT

## 2024-08-19 NOTE — PATIENT INSTRUCTIONS
"DASH Eating Plan  DASH stands for Dietary Approaches to Stop Hypertension. The DASH eating plan is a healthy eating plan that has been shown to:  Lower high blood pressure (hypertension).  Reduce your risk for type 2 diabetes, heart disease, and stroke.  Help with weight loss.  What are tips for following this plan?  Reading food labels  Check food labels for the amount of salt (sodium) per serving. Choose foods with less than 5 percent of the Daily Value (DV) of sodium. In general, foods with less than 300 milligrams (mg) of sodium per serving fit into this eating plan.  To find whole grains, look for the word \"whole\" as the first word in the ingredient list.  Shopping  Buy products labeled as \"low-sodium\" or \"no salt added.\"  Buy fresh foods. Avoid canned foods and pre-made or frozen meals.  Cooking  Try not to add salt when you cook. Use salt-free seasonings or herbs instead of table salt or sea salt. Check with your health care provider or pharmacist before using salt substitutes.  Do not vicente foods. Cook foods in healthy ways, such as baking, boiling, grilling, roasting, or broiling.  Cook using oils that are good for your heart. These include olive, canola, avocado, soybean, and sunflower oil.  Meal planning    Eat a balanced diet. This should include:  4 or more servings of fruits and 4 or more servings of vegetables each day. Try to fill half of your plate with fruits and vegetables.  6-8 servings of whole grains each day.  6 or less servings of lean meat, poultry, or fish each day. 1 oz is 1 serving. A 3 oz (85 g) serving of meat is about the same size as the palm of your hand. One egg is 1 oz (28 g).  2-3 servings of low-fat dairy each day. One serving is 1 cup (237 mL).  1 serving of nuts, seeds, or beans 5 times each week.  2-3 servings of heart-healthy fats. Healthy fats called omega-3 fatty acids are found in foods such as walnuts, flaxseeds, fortified milks, and eggs. These fats are also found in " cold-water fish, such as sardines, salmon, and mackerel.  Limit how much you eat of:  Canned or prepackaged foods.  Food that is high in trans fat, such as fried foods.  Food that is high in saturated fat, such as fatty meat.  Desserts and other sweets, sugary drinks, and other foods with added sugar.  Full-fat dairy products.  Do not salt foods before eating.  Do not eat more than 4 egg yolks a week.  Try to eat at least 2 vegetarian meals a week.  Eat more home-cooked food and less restaurant, buffet, and fast food.  Lifestyle  When eating at a restaurant, ask if your food can be made with less salt or no salt.  If you drink alcohol:  Limit how much you have to:  0-1 drink a day if you are female.  0-2 drinks a day if you are male.  Know how much alcohol is in your drink. In the [place] one drink is one 12 oz bottle of beer (355 mL), one 5 oz glass of wine (148 mL), or one 1½ oz glass of hard liquor (44 mL).  General information  Avoid eating more than 2,300 mg of salt a day. If you have hypertension, you may need to reduce your sodium intake to 1,500 mg a day.  Work with your provider to stay at a healthy body weight or lose weight. Ask what the best weight range is for you.  On most days of the week, get at least 30 minutes of exercise that causes your heart to beat faster. This may include walking, swimming, or biking.  Work with your provider or dietitian to adjust your eating plan to meet your specific calorie needs.  What foods should I eat?  Fruits  All fresh, dried, or frozen fruit. Canned fruits that are in their natural juice and do not have sugar added to them.  Vegetables  Fresh or frozen vegetables that are raw, steamed, roasted, or grilled. Low-sodium or reduced-sodium tomato and vegetable juice. Low-sodium or reduced-sodium tomato sauce and tomato paste. Low-sodium or reduced-sodium canned vegetables.  Grains  Whole-grain or whole-wheat bread. Whole-grain or whole-wheat pasta. Brown rice. Oatmeal.  Quinoa. Bulgur. Whole-grain and low-sodium cereals. Ciara bread. Low-fat, low-sodium crackers. Whole-wheat flour tortillas.  Meats and other proteins  Skinless chicken or turkey. Ground chicken or turkey. Pork with fat trimmed off. Fish and seafood. Egg whites. Dried beans, peas, or lentils. Unsalted nuts, nut butters, and seeds. Unsalted canned beans. Lean cuts of beef with fat trimmed off. Low-sodium, lean precooked or cured meat, such as sausages or meat loaves.  Dairy  Low-fat (1%) or fat-free (skim) milk. Reduced-fat, low-fat, or fat-free cheeses. Nonfat, low-sodium ricotta or cottage cheese. Low-fat or nonfat yogurt. Low-fat, low-sodium cheese.  Fats and oils  Soft margarine without trans fats. Vegetable oil. Reduced-fat, low-fat, or light mayonnaise and salad dressings (reduced-sodium). Canola, safflower, olive, avocado, soybean, and sunflower oils. Avocado.  Seasonings and condiments  Herbs. Spices. Seasoning mixes without salt.  Other foods  Unsalted popcorn and pretzels. Fat-free sweets.  The items listed above may not be all the foods and drinks you can have. Talk to a dietitian to learn more.  What foods should I avoid?  Fruits  Canned fruit in a light or heavy syrup. Fried fruit. Fruit in cream or butter sauce.  Vegetables  Creamed or fried vegetables. Vegetables in a cheese sauce. Regular canned vegetables that are not marked as low-sodium or reduced-sodium. Regular canned tomato sauce and paste that are not marked as low-sodium or reduced-sodium. Regular tomato and vegetable juices that are not marked as low-sodium or reduced-sodium. Pickles. Olives.  Grains  Baked goods made with fat, such as croissants, muffins, or some breads. Dry pasta or rice meal packs.  Meats and other proteins  Fatty cuts of meat. Ribs. Fried meat. Rodriguez. Bologna, salami, and other precooked or cured meats, such as sausages or meat loaves, that are not lean and low in sodium. Fat from the back of a pig (fatback). Raquel.  Salted nuts and seeds. Canned beans with added salt. Canned or smoked fish. Whole eggs or egg yolks. Chicken or turkey with skin.  Dairy  Whole or 2% milk, cream, and half-and-half. Whole or full-fat cream cheese. Whole-fat or sweetened yogurt. Full-fat cheese. Nondairy creamers. Whipped toppings. Processed cheese and cheese spreads.  Fats and oils  Butter. Stick margarine. Lard. Shortening. Ghee. Rodriguez fat. Tropical oils, such as coconut, palm kernel, or palm oil.  Seasonings and condiments  Onion salt, garlic salt, seasoned salt, table salt, and sea salt. Worcestershire sauce. Tartar sauce. Barbecue sauce. Teriyaki sauce. Soy sauce, including reduced-sodium soy sauce. Steak sauce. Canned and packaged gravies. Fish sauce. Oyster sauce. Cocktail sauce. Store-bought horseradish. Ketchup. Mustard. Meat flavorings and tenderizers. Bouillon cubes. Hot sauces. Pre-made or packaged marinades. Pre-made or packaged taco seasonings. Relishes. Regular salad dressings.  Other foods  Salted popcorn and pretzels.  The items listed above may not be all the foods and drinks you should avoid. Talk to a dietitian to learn more.  Where to find more information  National Heart, Lung, and Blood [place] (NHLBI): nhlbi.nih.gov  American Heart Association (AHA): heart.org  Academy of Nutrition and Dietetics: eatright.org  National Kidney Foundation (NKF): kidney.org  This information is not intended to replace advice given to you by your health care provider. Make sure you discuss any questions you have with your health care provider.  Document Revised: 01/04/2024 Document Reviewed: 01/04/2024  Elsevier Patient Education © 2024 [employer]  BMI for Adults  Body mass index (BMI) is a number found using a person's weight and height. BMI can help tell how much of a person's weight is made up of fat. BMI does not measure body fat directly. It is used instead of tests that directly measure body fat, which can be difficult and  "expensive.  What are BMI measurements used for?  BMI is useful to:  Find out if your weight puts you at higher risk for medical problems.  Help recommend changes, such as in diet and exercise. This can help you reach a healthy weight. BMI screening can be done again to see if these changes are working.  How is BMI calculated?  Your height and weight are measured. The BMI is found from those numbers. This can be done with [place] or metric measurements. Note that charts and online BMI calculators are available to help you find your BMI quickly and easily without doing these calculations.  To calculate your BMI in [place] measurements:  Measure your weight in pounds (lb).  Multiply the number of pounds by 703.  So, for an adult who weighs 150 lb, multiply that number by 703: 150 x 703, which equals 105,450.  Measure your height in inches. Then multiply that number by itself to get a measurement called \"inches squared.\"  So, for an adult who is 70 inches tall, the \"inches squared\" measurement is 70 inches x 70 inches, which equals 4,900 inches squared.  Divide the total from step 2 (number of lb x 703) by the total from step 3 (inches squared): 105,450 ÷ 4,900 = 21.5. This is your BMI.  To calculate your BMI in metric measurements:    Measure your weight in kilograms (kg).  For this example, the weight is 70 kg.  Measure your height in meters (m). Then multiply that number by itself to get a measurement called \"meters squared.\"  So, for an adult who is 1.75 m tall, the \"meters squared\" measurement is 1.75 m x 1.75 m, which equals 3.1 meters squared.  Divide the number of kilograms (your weight) by the meters squared number. In this example: 70 ÷ 3.1 = 22.6. This is your BMI.  What do the results mean?  BMI charts are used to see if you are underweight, normal weight, overweight, or obese. The following guidelines will be used:  Underweight: BMI less than 18.5.  Normal weight: BMI between 18.5 and 24.9.  Overweight: BMI " between 25 and 29.9.  Obese: BMI of 30 or above.  BMI is a tool and cannot diagnose a condition. Talk with your health care provider about what your BMI means for you. Keep these notes in mind:  Weight includes fat and muscle. Someone with a muscular build, such as an athlete, may have a BMI that is higher than 24.9. In cases like these, BMI is not a correct measure of body fat.  If you have a BMI of 25 or higher, your provider may need to do more testing to find out if excess body fat is the cause.  BMI is measured the same way for males and females. Females usually have more body fat than males of the same height and weight.  Where to find more information  For more information about BMI, including tools to quickly find your BMI, go to:  Centers for Disease Control and Prevention: cdc.gov  American Heart Association: heart.org  National Heart, Lung, and Blood [place]: nhlbi.nih.gov  This information is not intended to replace advice given to you by your health care provider. Make sure you discuss any questions you have with your health care provider.  Document Revised: 09/07/2023 Document Reviewed: 08/31/2023  Elsevier Patient Education © 2024 Elsevier Inc.
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
## 2024-08-20 LAB
AMPHETAMINES UR QL SCN: NEGATIVE NG/ML
BARBITURATES UR QL SCN: NEGATIVE NG/ML
BENZODIAZ UR QL SCN: NEGATIVE NG/ML
BZE UR QL SCN: NEGATIVE NG/ML
CANNABINOIDS UR QL SCN: NEGATIVE NG/ML
CREAT UR-MCNC: 160.1 MG/DL (ref 20–300)
FENTANYL UR-MCNC: NEGATIVE PG/ML
LABORATORY COMMENT REPORT: ABNORMAL
MEPERIDINE UR QL: NEGATIVE NG/ML
METHADONE UR QL SCN: NEGATIVE NG/ML
OPIATES UR QL SCN: NEGATIVE NG/ML
OXYCODONE+OXYMORPHONE UR QL SCN: POSITIVE
PCP UR QL: NEGATIVE NG/ML
PH UR: 6.5 [PH] (ref 4.5–8.9)
PROPOXYPH UR QL SCN: NEGATIVE NG/ML
SP GR UR: 1.02
TRAMADOL UR QL SCN: NEGATIVE NG/ML

## 2024-08-22 ENCOUNTER — OFFICE VISIT (OUTPATIENT)
Dept: NEUROSURGERY | Facility: CLINIC | Age: 59
End: 2024-08-22
Payer: MEDICAID

## 2024-08-22 VITALS — HEIGHT: 67 IN | BODY MASS INDEX: 29.03 KG/M2 | WEIGHT: 185 LBS

## 2024-08-22 DIAGNOSIS — M50.30 DEGENERATION OF CERVICAL INTERVERTEBRAL DISC: ICD-10-CM

## 2024-08-22 DIAGNOSIS — F17.200 SMOKER: ICD-10-CM

## 2024-08-22 DIAGNOSIS — M48.02 SPINAL STENOSIS IN CERVICAL REGION: Primary | ICD-10-CM

## 2024-08-22 DIAGNOSIS — E66.3 OVERWEIGHT WITH BODY MASS INDEX (BMI) OF 28 TO 28.9 IN ADULT: ICD-10-CM

## 2024-08-22 DIAGNOSIS — M05.79 RHEUMATOID ARTHRITIS INVOLVING MULTIPLE SITES WITH POSITIVE RHEUMATOID FACTOR: ICD-10-CM

## 2024-08-22 PROCEDURE — 99214 OFFICE O/P EST MOD 30 MIN: CPT | Performed by: NEUROLOGICAL SURGERY

## 2024-08-22 PROCEDURE — 1160F RVW MEDS BY RX/DR IN RCRD: CPT | Performed by: NEUROLOGICAL SURGERY

## 2024-08-22 PROCEDURE — 1159F MED LIST DOCD IN RCRD: CPT | Performed by: NEUROLOGICAL SURGERY
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
## 2024-08-22 NOTE — PATIENT INSTRUCTIONS
"PATIENT TO CONTINUE TO FOLLOW UP WITH HIS PRIMARY CARE PROVIDER FOR YEARLY PHYSICAL EXAMS TO ENSURE COMPLETE HEALTH MAINTENANCE      BMI for Adults  Body mass index (BMI) is a number found using a person's weight and height. BMI can help tell how much of a person's weight is made up of fat. BMI does not measure body fat directly. It is used instead of tests that directly measure body fat, which can be difficult and expensive.  What are BMI measurements used for?  BMI is useful to:  Find out if your weight puts you at higher risk for medical problems.  Help recommend changes, such as in diet and exercise. This can help you reach a healthy weight. BMI screening can be done again to see if these changes are working.  How is BMI calculated?  Your height and weight are measured. The BMI is found from those numbers. This can be done with U[place] or metric measurements. Note that charts and online BMI calculators are available to help you find your BMI quickly and easily without doing these calculations.  To calculate your BMI in U[place] measurements:  Measure your weight in pounds (lb).  Multiply the number of pounds by 703.  So, for an adult who weighs 150 lb, multiply that number by 703: 150 x 703, which equals 105,450.  Measure your height in inches. Then multiply that number by itself to get a measurement called \"inches squared.\"  So, for an adult who is 70 inches tall, the \"inches squared\" measurement is 70 inches x 70 inches, which equals 4,900 inches squared.  Divide the total from step 2 (number of lb x 703) by the total from step 3 (inches squared): 105,450 ÷ 4,900 = 21.5. This is your BMI.  To calculate your BMI in metric measurements:    Measure your weight in kilograms (kg).  For this example, the weight is 70 kg.  Measure your height in meters (m). Then multiply that number by itself to get a measurement called \"meters squared.\"  So, for an adult who is 1.75 m tall, the \"meters squared\" measurement is 1.75 m x 1.75 " m, which equals 3.1 meters squared.  Divide the number of kilograms (your weight) by the meters squared number. In this example: 70 ÷ 3.1 = 22.6. This is your BMI.  What do the results mean?  BMI charts are used to see if you are underweight, normal weight, overweight, or obese. The following guidelines will be used:  Underweight: BMI less than 18.5.  Normal weight: BMI between 18.5 and 24.9.  Overweight: BMI between 25 and 29.9.  Obese: BMI of 30 or above.  BMI is a tool and cannot diagnose a condition. Talk with your health care provider about what your BMI means for you. Keep these notes in mind:  Weight includes fat and muscle. Someone with a muscular build, such as an athlete, may have a BMI that is higher than 24.9. In cases like these, BMI is not a correct measure of body fat.  If you have a BMI of 25 or higher, your provider may need to do more testing to find out if excess body fat is the cause.  BMI is measured the same way for males and females. Females usually have more body fat than males of the same height and weight.  Where to find more information  For more information about BMI, including tools to quickly find your BMI, go to:  Centers for Disease Control and Prevention: cdc.gov  American Heart Association: heart.org  National Heart, Lung, and Blood [place]: nhlbi.nih.gov  This information is not intended to replace advice given to you by your health care provider. Make sure you discuss any questions you have with your health care provider.  Document Revised: 09/07/2023 Document Reviewed: 08/31/2023  [employer] Patient Education © 2024 [employer]  DASH Eating Plan  DASH stands for Dietary Approaches to Stop Hypertension. The DASH eating plan is a healthy eating plan that has been shown to:  Lower high blood pressure (hypertension).  Reduce your risk for type 2 diabetes, heart disease, and stroke.  Help with weight loss.  What are tips for following this plan?  Reading food labels  Check food  "labels for the amount of salt (sodium) per serving. Choose foods with less than 5 percent of the Daily Value (DV) of sodium. In general, foods with less than 300 milligrams (mg) of sodium per serving fit into this eating plan.  To find whole grains, look for the word \"whole\" as the first word in the ingredient list.  Shopping  Buy products labeled as \"low-sodium\" or \"no salt added.\"  Buy fresh foods. Avoid canned foods and pre-made or frozen meals.  Cooking  Try not to add salt when you cook. Use salt-free seasonings or herbs instead of table salt or sea salt. Check with your health care provider or pharmacist before using salt substitutes.  Do not vicente foods. Cook foods in healthy ways, such as baking, boiling, grilling, roasting, or broiling.  Cook using oils that are good for your heart. These include olive, canola, avocado, soybean, and sunflower oil.  Meal planning    Eat a balanced diet. This should include:  4 or more servings of fruits and 4 or more servings of vegetables each day. Try to fill half of your plate with fruits and vegetables.  6-8 servings of whole grains each day.  6 or less servings of lean meat, poultry, or fish each day. 1 oz is 1 serving. A 3 oz (85 g) serving of meat is about the same size as the palm of your hand. One egg is 1 oz (28 g).  2-3 servings of low-fat dairy each day. One serving is 1 cup (237 mL).  1 serving of nuts, seeds, or beans 5 times each week.  2-3 servings of heart-healthy fats. Healthy fats called omega-3 fatty acids are found in foods such as walnuts, flaxseeds, fortified milks, and eggs. These fats are also found in cold-water fish, such as sardines, salmon, and mackerel.  Limit how much you eat of:  Canned or prepackaged foods.  Food that is high in trans fat, such as fried foods.  Food that is high in saturated fat, such as fatty meat.  Desserts and other sweets, sugary drinks, and other foods with added sugar.  Full-fat dairy products.  Do not salt foods before " eating.  Do not eat more than 4 egg yolks a week.  Try to eat at least 2 vegetarian meals a week.  Eat more home-cooked food and less restaurant, buffet, and fast food.  Lifestyle  When eating at a restaurant, ask if your food can be made with less salt or no salt.  If you drink alcohol:  Limit how much you have to:  0-1 drink a day if you are female.  0-2 drinks a day if you are male.  Know how much alcohol is in your drink. In the [place] one drink is one 12 oz bottle of beer (355 mL), one 5 oz glass of wine (148 mL), or one 1½ oz glass of hard liquor (44 mL).  General information  Avoid eating more than 2,300 mg of salt a day. If you have hypertension, you may need to reduce your sodium intake to 1,500 mg a day.  Work with your provider to stay at a healthy body weight or lose weight. Ask what the best weight range is for you.  On most days of the week, get at least 30 minutes of exercise that causes your heart to beat faster. This may include walking, swimming, or biking.  Work with your provider or dietitian to adjust your eating plan to meet your specific calorie needs.  What foods should I eat?  Fruits  All fresh, dried, or frozen fruit. Canned fruits that are in their natural juice and do not have sugar added to them.  Vegetables  Fresh or frozen vegetables that are raw, steamed, roasted, or grilled. Low-sodium or reduced-sodium tomato and vegetable juice. Low-sodium or reduced-sodium tomato sauce and tomato paste. Low-sodium or reduced-sodium canned vegetables.  Grains  Whole-grain or whole-wheat bread. Whole-grain or whole-wheat pasta. Brown rice. Oatmeal. Quinoa. Bulgur. Whole-grain and low-sodium cereals. Ciara bread. Low-fat, low-sodium crackers. Whole-wheat flour tortillas.  Meats and other proteins  Skinless chicken or turkey. Ground chicken or turkey. Pork with fat trimmed off. Fish and seafood. Egg whites. Dried beans, peas, or lentils. Unsalted nuts, nut butters, and seeds. Unsalted canned beans. Lean  cuts of beef with fat trimmed off. Low-sodium, lean precooked or cured meat, such as sausages or meat loaves.  Dairy  Low-fat (1%) or fat-free (skim) milk. Reduced-fat, low-fat, or fat-free cheeses. Nonfat, low-sodium ricotta or cottage cheese. Low-fat or nonfat yogurt. Low-fat, low-sodium cheese.  Fats and oils  Soft margarine without trans fats. Vegetable oil. Reduced-fat, low-fat, or light mayonnaise and salad dressings (reduced-sodium). Canola, safflower, olive, avocado, soybean, and sunflower oils. Avocado.  Seasonings and condiments  Herbs. Spices. Seasoning mixes without salt.  Other foods  Unsalted popcorn and pretzels. Fat-free sweets.  The items listed above may not be all the foods and drinks you can have. Talk to a dietitian to learn more.  What foods should I avoid?  Fruits  Canned fruit in a light or heavy syrup. Fried fruit. Fruit in cream or butter sauce.  Vegetables  Creamed or fried vegetables. Vegetables in a cheese sauce. Regular canned vegetables that are not marked as low-sodium or reduced-sodium. Regular canned tomato sauce and paste that are not marked as low-sodium or reduced-sodium. Regular tomato and vegetable juices that are not marked as low-sodium or reduced-sodium. Pickles. Olives.  Grains  Baked goods made with fat, such as croissants, muffins, or some breads. Dry pasta or rice meal packs.  Meats and other proteins  Fatty cuts of meat. Ribs. Fried meat. Rodriguez. Bologna, salami, and other precooked or cured meats, such as sausages or meat loaves, that are not lean and low in sodium. Fat from the back of a pig (fatback). Bratwurst. Salted nuts and seeds. Canned beans with added salt. Canned or smoked fish. Whole eggs or egg yolks. Chicken or turkey with skin.  Dairy  Whole or 2% milk, cream, and half-and-half. Whole or full-fat cream cheese. Whole-fat or sweetened yogurt. Full-fat cheese. Nondairy creamers. Whipped toppings. Processed cheese and cheese spreads.  Fats and oils  Butter.  Stick margarine. Lard. Shortening. Ghee. Rodriguez fat. Tropical oils, such as coconut, palm kernel, or palm oil.  Seasonings and condiments  Onion salt, garlic salt, seasoned salt, table salt, and sea salt. Worcestershire sauce. Tartar sauce. Barbecue sauce. Teriyaki sauce. Soy sauce, including reduced-sodium soy sauce. Steak sauce. Canned and packaged gravies. Fish sauce. Oyster sauce. Cocktail sauce. Store-bought horseradish. Ketchup. Mustard. Meat flavorings and tenderizers. Bouillon cubes. Hot sauces. Pre-made or packaged marinades. Pre-made or packaged taco seasonings. Relishes. Regular salad dressings.  Other foods  Salted popcorn and pretzels.  The items listed above may not be all the foods and drinks you should avoid. Talk to a dietitian to learn more.  Where to find more information  National Heart, Lung, and Blood [place] (NHLBI): nhlbi.nih.gov  American Heart Association (AHA): heart.org  Academy of Nutrition and Dietetics: eatright.org  National Kidney Foundation (NKF): kidney.org  This information is not intended to replace advice given to you by your health care provider. Make sure you discuss any questions you have with your health care provider.  Document Revised: 01/04/2024 Document Reviewed: 01/04/2024  [employer] Patient Education © 2024 [employer]  Steps to Quit Smoking  Smoking tobacco is the leading cause of preventable death. It can affect almost every organ in the body. Smoking puts you and those around you at risk for developing many serious chronic diseases.  Quitting smoking can be very challenging. Do not get discouraged if you are not successful the first time. Some people need to make many attempts to quit before they achieve long-term success. Do your best to stick to your quit plan, and talk with your health care provider if you have any questions or concerns.  How do I get ready to quit?  When you decide to quit smoking, create a plan to help you succeed. Before you quit:  Pick a  date to quit. Set a date within the next 2 weeks to give you time to prepare.  Write down the reasons why you are quitting. Keep this list in places where you will see it often.  Tell your family, friends, and co-workers that you are quitting. Support from people you are close to can make quitting easier.  Talk with your health care provider about your options for quitting smoking.  Find out what treatment options are covered by your health insurance.  Identify people, places, things, and activities that make you want to smoke (triggers). Avoid them.  What first steps can I take to quit smoking?  Throw away all cigarettes at home, at work, and in your car.  Throw away smoking accessories, such as ashtrays and lighters.  Clean your car. Make sure to empty the ashtray.  Clean your home, including curtains and carpets.  What strategies can I use to quit smoking?  Talk with your health care provider about combining strategies, such as taking medicines while you are also receiving in-person counseling. Using these two strategies together makes you more likely to succeed in quitting than if you used either strategy on its own.  If you are pregnant or breastfeeding, talk with your health care provider about finding counseling or other support strategies to quit smoking. Do not take medicine to help you quit smoking unless your health care provider tells you to.  Quit right away  Quit smoking completely, instead of gradually reducing how much you smoke over a period of time. Stopping smoking right away may be more successful than gradually quitting.  Attend in-person counseling to help you build problem-solving skills. You are more likely to succeed in quitting if you attend counseling sessions regularly. Even short sessions of 10 minutes can be effective.  Take medicine  You may take medicines to help you quit smoking. Some medicines require a prescription. You can also purchase over-the-counter medicines. Medicines may  have nicotine in them to replace the nicotine in cigarettes. Medicines may:  Help to stop cravings.  Help to relieve withdrawal symptoms.  Your health care provider may recommend:  Nicotine patches, gum, or lozenges.  Nicotine inhalers or sprays.  Non-nicotine medicine that you take by mouth.  Find resources  Find resources and support systems that can help you quit smoking and remain smoke-free after you quit. These resources are most helpful when you use them often. They include:  Online chats with a counselor.  Telephone quitlines.  Printed self-help materials.  Support groups or group counseling.  Text messaging programs.  Mobile phone apps or applications. Use apps that can help you stick to your quit plan by providing reminders, tips, and encouragement. Examples of free services include Quit Guide from the CDC and smokefree.gov    What can I do to make it easier to quit?    Reach out to your family and friends for support and encouragement. Call telephone quitlines, such as 8-800-QUIT-NOW, reach out to support groups, or work with a counselor for support.  Ask people who smoke to avoid smoking around you.  Avoid places that trigger you to smoke, such as bars, parties, or smoke-break areas at work.  Spend time with people who do not smoke.  Lessen the stress in your life. Stress can be a smoking trigger for some people. To lessen stress, try:  Exercising regularly.  Doing deep-breathing exercises.  Doing yoga.  Meditating.  What benefits will I see if I quit smoking?  Over time, you should start to see positive results, such as:  Improved sense of smell and taste.  Decreased coughing and sore throat.  Slower heart rate.  Lower blood pressure.  Clearer and healthier skin.  The ability to breathe more easily.  Fewer sick days.  Summary  Quitting smoking can be very challenging. Do not get discouraged if you are not successful the first time. Some people need to make many attempts to quit before they achieve  long-term success.  When you decide to quit smoking, create a plan to help you succeed.  Quit smoking right away, not slowly over a period of time.  Find resources and support systems that can help you quit smoking and remain smoke-free after you quit.  This information is not intended to replace advice given to you by your health care provider. Make sure you discuss any questions you have with your health care provider.  Document Revised: 12/09/2022 Document Reviewed: 12/09/2022  [employer] Patient Education © 2024 [employer]  Tobacco Use Disorder  Tobacco use disorder (TUD) occurs when a person craves, seeks, and uses tobacco, regardless of the consequences. This disorder can cause problems with mental and physical health. It can affect your ability to have healthy relationships. It can also keep you from meeting your responsibilities at work, home, or school.  Tobacco products contain a dangerous chemical called nicotine. Nicotine triggers hormones that make the body feel stimulated and works on areas of the brain that make a person feel good. These effects can make the person depend on nicotine, which makes it hard to quit tobacco.  Tobacco may be:  Smoked as a cigarette or cigar.  Inhaled using vaping devices, such as e-cigarettes.  Smoked in a pipe or hookah.  Chewed as smokeless tobacco.  Inhaled into the nostrils as snuff.  What are the causes?  This condition is caused by using nicotine. Nicotine causes changes in your brain that make you want more and more. This is called addiction. This can make it hard to stop using tobacco once you start.  What are the signs or symptoms?  Symptoms of TUD may include:  Being unable to stop your tobacco use even after planned quit attempts.  Spending an abnormal amount of time getting or using tobacco.  Craving tobacco.  Tobacco use that:  Interferes with your work, school, or home life.  Interferes with your personal and social relationships.  Makes you give up  activities that you once enjoyed or found important.  Using tobacco even though you know that it is:  Dangerous or bad for your health or someone else's health.  Causing problems in your life.  Needing more and more of the substance to get the same effect (developing tolerance).  Using the substance to avoid unpleasant symptoms if you do not use the substance (withdrawal).  How is this diagnosed?  This condition may be diagnosed based on:  Your current and past tobacco use. Your health care provider may ask questions about how your tobacco use affects your life.  A physical exam.  You may be diagnosed with TUD if you have at least two symptoms within a 12-month period.  How is this treated?  This condition is treated by stopping tobacco use. Many people are unable to quit on their own and need help. Treatment may include:  Nicotine replacement therapy (NRT). NRT provides nicotine without the other harmful chemicals in tobacco. NRT gradually lowers the dosage of nicotine in the body and reduces withdrawal symptoms. NRT is available as:  Over-the-counter gums, lozenges, and skin patches.  Prescription mouth inhalers and nasal sprays.  Medicine that acts on the brain to reduce cravings and withdrawal symptoms.  A type of talk therapy that examines your triggers for tobacco use, how to avoid them, and how to cope with cravings (behavioral therapy).  Hypnosis. This may help with withdrawal symptoms.  Joining a support group for people coping with TUD.  The best treatment for TUD is usually a combination of medicine, talk therapy, and support groups. Recovery can be a long process. Many people start using tobacco again after stopping (relapse). If you relapse, it does not mean that treatment will not work.  Follow these instructions at home:    Lifestyle  Do not use any products that contain nicotine or tobacco. These products include cigarettes, chewing tobacco, and vaping devices, such as e-cigarettes. If you need help  quitting, ask your health care provider.  Avoid things that trigger tobacco use as much as you can. Triggers include people and situations that usually cause you to use tobacco.  Avoid drinks that contain caffeine, including coffee. These may worsen some withdrawal symptoms.  Find ways to manage stress. Wanting to smoke may cause stress, and stress can make you want to smoke. Relaxation techniques such as deep breathing, meditation, and yoga may help.  Attend support groups as needed. These groups are an important part of long-term recovery for many people.  General instructions  Take over-the-counter and prescription medicines only as told by your health care provider.  Check with your health care provider before taking any new prescription or over-the-counter medicines.  Decide on a friend, family member, or smoking quit-line (such as 1-800-QUIT-NOW in the [place] that you can call or text when you feel the urge to smoke or when you need help coping with cravings.  Keep all follow-up visits. This is important.  Contact a health care provider if:  You are not able to take your medicines as told by your health care provider.  Your symptoms get worse, even with treatment.  Summary  Tobacco use disorder (TUD) occurs when a person craves, seeks, and uses tobacco regardless of the consequences.  This condition may be diagnosed based on your current and past tobacco use and a physical exam.  Many people are unable to quit on their own and need help. Recovery can be a long process.  The most effective treatment for TUD is usually a combination of medicine, talk therapy, and support groups.  This information is not intended to replace advice given to you by your health care provider. Make sure you discuss any questions you have with your health care provider.  Document Revised: 12/13/2022 Document Reviewed: 12/13/2022  Elsevier Patient Education © 2024 [employer].

## 2024-09-04 ENCOUNTER — TELEPHONE (OUTPATIENT)
Dept: NEUROSURGERY | Facility: CLINIC | Age: 59
End: 2024-09-04
Payer: MEDICAID

## 2024-09-04 ENCOUNTER — OFFICE VISIT (OUTPATIENT)
Dept: INTERNAL MEDICINE | Facility: CLINIC | Age: 59
End: 2024-09-04
Payer: MEDICAID

## 2024-09-04 VITALS
TEMPERATURE: 98 F | BODY MASS INDEX: 29.66 KG/M2 | WEIGHT: 189 LBS | OXYGEN SATURATION: 98 % | HEIGHT: 67 IN | SYSTOLIC BLOOD PRESSURE: 130 MMHG | HEART RATE: 67 BPM | DIASTOLIC BLOOD PRESSURE: 78 MMHG

## 2024-09-04 DIAGNOSIS — Z01.818 PREOP EXAMINATION: ICD-10-CM

## 2024-09-04 DIAGNOSIS — R73.9 ELEVATED BLOOD SUGAR: ICD-10-CM

## 2024-09-04 DIAGNOSIS — Z00.00 WELLNESS EXAMINATION: ICD-10-CM

## 2024-09-04 DIAGNOSIS — Z11.59 ENCOUNTER FOR HEPATITIS C SCREENING TEST FOR LOW RISK PATIENT: ICD-10-CM

## 2024-09-04 DIAGNOSIS — Z12.5 SCREENING PSA (PROSTATE SPECIFIC ANTIGEN): Primary | ICD-10-CM

## 2024-09-04 PROBLEM — M48.02 SPINAL STENOSIS IN CERVICAL REGION: Status: ACTIVE | Noted: 2024-08-22

## 2024-09-04 PROBLEM — M50.30 DEGENERATION OF CERVICAL INTERVERTEBRAL DISC: Status: ACTIVE | Noted: 2024-08-22

## 2024-09-04 PROCEDURE — 1160F RVW MEDS BY RX/DR IN RCRD: CPT | Performed by: FAMILY MEDICINE

## 2024-09-04 PROCEDURE — 93000 ELECTROCARDIOGRAM COMPLETE: CPT | Performed by: FAMILY MEDICINE

## 2024-09-04 PROCEDURE — 1159F MED LIST DOCD IN RCRD: CPT | Performed by: FAMILY MEDICINE

## 2024-09-04 PROCEDURE — 1125F AMNT PAIN NOTED PAIN PRSNT: CPT | Performed by: FAMILY MEDICINE

## 2024-09-04 PROCEDURE — 3075F SYST BP GE 130 - 139MM HG: CPT | Performed by: FAMILY MEDICINE

## 2024-09-04 PROCEDURE — 99396 PREV VISIT EST AGE 40-64: CPT | Performed by: FAMILY MEDICINE

## 2024-09-04 PROCEDURE — 3078F DIAST BP <80 MM HG: CPT | Performed by: FAMILY MEDICINE

## 2024-09-04 NOTE — TELEPHONE ENCOUNTER
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        Surgery name: CERVICAL DISCECTOMY ANTERIOR WITH FUSION C4-5,5-6,6-7       PRE OP TESTING ON 9/16/24     In order to do this procedure, we are requesting surgical clearance from you. We request documentation stating patient clearance status. This can be an addendum to this encounter, an office note stating patient is cleared or a letter.                      Thank you,           Mariana Yeager  Surgery Scheduling Coordinator   [facility] [place] [facility]  [address]  [place] [address]  P: [phone]  F: [phone]

## 2024-09-06 LAB
ALBUMIN SERPL-MCNC: 4.4 G/DL (ref 3.8–4.9)
ALP SERPL-CCNC: 76 IU/L (ref 44–121)
ALT SERPL-CCNC: 16 IU/L (ref 0–44)
AST SERPL-CCNC: 17 IU/L (ref 0–40)
BASOPHILS # BLD AUTO: 0 X10E3/UL (ref 0–0.2)
BASOPHILS NFR BLD AUTO: 1 %
BILIRUB SERPL-MCNC: 0.3 MG/DL (ref 0–1.2)
BUN SERPL-MCNC: 18 MG/DL (ref 6–24)
BUN/CREAT SERPL: 16 (ref 9–20)
CALCIUM SERPL-MCNC: 9.4 MG/DL (ref 8.7–10.2)
CHLORIDE SERPL-SCNC: 99 MMOL/L (ref 96–106)
CHOLEST SERPL-MCNC: 163 MG/DL (ref 100–199)
CO2 SERPL-SCNC: 22 MMOL/L (ref 20–29)
CREAT SERPL-MCNC: 1.11 MG/DL (ref 0.76–1.27)
EGFRCR SERPLBLD CKD-EPI 2021: 76 ML/MIN/1.73
EOSINOPHIL # BLD AUTO: 0.2 X10E3/UL (ref 0–0.4)
EOSINOPHIL NFR BLD AUTO: 3 %
ERYTHROCYTE [DISTWIDTH] IN BLOOD BY AUTOMATED COUNT: 13 % (ref 11.6–15.4)
GLOBULIN SER CALC-MCNC: 2.6 G/DL (ref 1.5–4.5)
GLUCOSE SERPL-MCNC: 89 MG/DL (ref 70–99)
HBA1C MFR BLD: 5.5 % (ref 4.8–5.6)
HCT VFR BLD AUTO: 42.3 % (ref 37.5–51)
HCV IGG SERPL QL IA: NON REACTIVE
HDLC SERPL-MCNC: 46 MG/DL
HGB BLD-MCNC: 14 G/DL (ref 13–17.7)
IMM GRANULOCYTES # BLD AUTO: 0 X10E3/UL (ref 0–0.1)
IMM GRANULOCYTES NFR BLD AUTO: 0 %
LDLC SERPL CALC-MCNC: 70 MG/DL (ref 0–99)
LYMPHOCYTES # BLD AUTO: 2.3 X10E3/UL (ref 0.7–3.1)
LYMPHOCYTES NFR BLD AUTO: 37 %
MCH RBC QN AUTO: 30.6 PG (ref 26.6–33)
MCHC RBC AUTO-ENTMCNC: 33.1 G/DL (ref 31.5–35.7)
MCV RBC AUTO: 93 FL (ref 79–97)
MONOCYTES # BLD AUTO: 0.7 X10E3/UL (ref 0.1–0.9)
MONOCYTES NFR BLD AUTO: 11 %
NEUTROPHILS # BLD AUTO: 3 X10E3/UL (ref 1.4–7)
NEUTROPHILS NFR BLD AUTO: 48 %
PLATELET # BLD AUTO: 214 X10E3/UL (ref 150–450)
POTASSIUM SERPL-SCNC: 4.2 MMOL/L (ref 3.5–5.2)
PROT SERPL-MCNC: 7 G/DL (ref 6–8.5)
PSA SERPL-MCNC: 0.3 NG/ML (ref 0–4)
RBC # BLD AUTO: 4.57 X10E6/UL (ref 4.14–5.8)
SODIUM SERPL-SCNC: 138 MMOL/L (ref 134–144)
TRIGL SERPL-MCNC: 292 MG/DL (ref 0–149)
TSH SERPL DL<=0.005 MIU/L-ACNC: 2.25 UIU/ML (ref 0.45–4.5)
VLDLC SERPL CALC-MCNC: 47 MG/DL (ref 5–40)
WBC # BLD AUTO: 6.2 X10E3/UL (ref 3.4–10.8)

## 2024-09-13 DIAGNOSIS — M50.30 DEGENERATIVE DISC DISEASE, CERVICAL: ICD-10-CM

## 2024-09-13 DIAGNOSIS — M05.79 RHEUMATOID ARTHRITIS INVOLVING MULTIPLE SITES WITH POSITIVE RHEUMATOID FACTOR: ICD-10-CM

## 2024-09-13 RX ORDER — OXYCODONE AND ACETAMINOPHEN 7.5; 325 MG/1; MG/1
1 TABLET ORAL EVERY 8 HOURS PRN
Qty: 90 TABLET | Refills: 0 | Status: SHIPPED | OUTPATIENT
Start: 2024-09-13

## 2024-09-13 NOTE — TELEPHONE ENCOUNTER
Caller: FREDI HERNANDEZ    Relationship: Emergency Contact    Best call back number: [phone]    Requested Prescriptions:   Requested Prescriptions     Pending Prescriptions Disp Refills    oxyCODONE-acetaminophen (Percocet) 7.5-325 MG per tablet 90 tablet 0     Sig: Take 1 tablet by mouth Every 8 (Eight) Hours As Needed for Moderate Pain.        Pharmacy where request should be sent:  [facility] [address] [phone] [facility] [phone]      Last office visit with prescribing clinician: 9/4/2024   Last telemedicine visit with prescribing clinician: Visit date not found   Next office visit with prescribing clinician: 10/14/2024     Additional details provided by patient: PATIENT COMPLETELY OUT    Does the patient have less than a 3 day supply:  [x] Yes  [] No    Would you like a call back once the refill request has been completed: [] Yes [] No    If the office needs to give you a call back, can they leave a voicemail: [] Yes [] No    David Arellano Rep   09/13/24 13:49 CDT

## 2024-09-16 ENCOUNTER — PRE-ADMISSION TESTING (OUTPATIENT)
Dept: PREADMISSION TESTING | Facility: HOSPITAL | Age: 59
End: 2024-09-16
Payer: MEDICAID

## 2024-09-16 VITALS
DIASTOLIC BLOOD PRESSURE: 65 MMHG | WEIGHT: 189.82 LBS | HEIGHT: 67 IN | OXYGEN SATURATION: 96 % | RESPIRATION RATE: 16 BRPM | HEART RATE: 58 BPM | BODY MASS INDEX: 29.79 KG/M2 | SYSTOLIC BLOOD PRESSURE: 116 MMHG

## 2024-09-16 DIAGNOSIS — M50.30 DEGENERATION OF CERVICAL INTERVERTEBRAL DISC: ICD-10-CM

## 2024-09-16 DIAGNOSIS — M48.02 SPINAL STENOSIS IN CERVICAL REGION: ICD-10-CM

## 2024-09-16 LAB
BILIRUB UR QL STRIP: NEGATIVE
CLARITY UR: CLEAR
COLOR UR: YELLOW
GLUCOSE UR STRIP-MCNC: NEGATIVE MG/DL
HGB UR QL STRIP.AUTO: NEGATIVE
KETONES UR QL STRIP: NEGATIVE
LEUKOCYTE ESTERASE UR QL STRIP.AUTO: NEGATIVE
NITRITE UR QL STRIP: NEGATIVE
PH UR STRIP.AUTO: 5.5 [PH] (ref 5–8)
PROT UR QL STRIP: NEGATIVE
SP GR UR STRIP: 1.01 (ref 1–1.03)
UROBILINOGEN UR QL STRIP: NORMAL

## 2024-09-16 PROCEDURE — 81003 URINALYSIS AUTO W/O SCOPE: CPT

## 2024-09-25 ENCOUNTER — ANESTHESIA EVENT (OUTPATIENT)
Dept: PERIOP | Facility: HOSPITAL | Age: 59
End: 2024-09-25
Payer: MEDICAID

## 2024-09-25 ENCOUNTER — APPOINTMENT (OUTPATIENT)
Dept: GENERAL RADIOLOGY | Facility: HOSPITAL | Age: 59
End: 2024-09-25
Payer: MEDICAID

## 2024-09-25 ENCOUNTER — ANESTHESIA (OUTPATIENT)
Dept: PERIOP | Facility: HOSPITAL | Age: 59
End: 2024-09-25
Payer: MEDICAID

## 2024-09-25 ENCOUNTER — HOSPITAL ENCOUNTER (OUTPATIENT)
Facility: HOSPITAL | Age: 59
Discharge: HOME | End: 2024-09-26
Attending: NEUROLOGICAL SURGERY | Admitting: NEUROLOGICAL SURGERY
Payer: MEDICAID

## 2024-09-25 DIAGNOSIS — M50.30 DEGENERATION OF CERVICAL INTERVERTEBRAL DISC: ICD-10-CM

## 2024-09-25 DIAGNOSIS — M48.02 SPINAL STENOSIS IN CERVICAL REGION: ICD-10-CM

## 2024-09-25 DIAGNOSIS — M50.30 DEGENERATIVE DISC DISEASE, CERVICAL: ICD-10-CM

## 2024-09-25 DIAGNOSIS — Z74.09 IMPAIRED MOBILITY: Primary | ICD-10-CM

## 2024-09-25 DIAGNOSIS — M05.79 RHEUMATOID ARTHRITIS INVOLVING MULTIPLE SITES WITH POSITIVE RHEUMATOID FACTOR: ICD-10-CM

## 2024-09-25 LAB
ABO GROUP BLD: NORMAL
BLD GP AB SCN SERPL QL: NEGATIVE
RH BLD: POSITIVE
T&S EXPIRATION DATE: NORMAL

## 2024-09-25 PROCEDURE — C1713 ANCHOR/SCREW BN/BN,TIS/BN: HCPCS | Performed by: NEUROLOGICAL SURGERY

## 2024-09-25 PROCEDURE — 25010000002 CEFAZOLIN PER 500 MG: Performed by: NURSE PRACTITIONER

## 2024-09-25 PROCEDURE — 25010000002 VASOPRESSIN 20 UNIT/ML SOLUTION: Performed by: NURSE ANESTHETIST, CERTIFIED REGISTERED

## 2024-09-25 PROCEDURE — 88304 TISSUE EXAM BY PATHOLOGIST: CPT | Performed by: NEUROLOGICAL SURGERY

## 2024-09-25 PROCEDURE — 22846 INSERT SPINE FIXATION DEVICE: CPT | Performed by: NEUROLOGICAL SURGERY

## 2024-09-25 PROCEDURE — 76000 FLUOROSCOPY <1 HR PHYS/QHP: CPT

## 2024-09-25 PROCEDURE — 22552 ARTHRD ANT NTRBD CERVICAL EA: CPT | Performed by: NEUROLOGICAL SURGERY

## 2024-09-25 PROCEDURE — 25010000002 PROPOFOL 1000 MG/100ML EMULSION: Performed by: NURSE ANESTHETIST, CERTIFIED REGISTERED

## 2024-09-25 PROCEDURE — 86901 BLOOD TYPING SEROLOGIC RH(D): CPT | Performed by: NEUROLOGICAL SURGERY

## 2024-09-25 PROCEDURE — 86900 BLOOD TYPING SEROLOGIC ABO: CPT | Performed by: NEUROLOGICAL SURGERY

## 2024-09-25 PROCEDURE — 25810000003 SODIUM CHLORIDE PER 500 ML: Performed by: NEUROLOGICAL SURGERY

## 2024-09-25 PROCEDURE — 25010000002 CEFAZOLIN PER 500 MG: Performed by: NEUROLOGICAL SURGERY

## 2024-09-25 PROCEDURE — 22853 INSJ BIOMECHANICAL DEVICE: CPT | Performed by: NEUROLOGICAL SURGERY

## 2024-09-25 PROCEDURE — 25010000002 ONDANSETRON PER 1 MG: Performed by: NURSE ANESTHETIST, CERTIFIED REGISTERED

## 2024-09-25 PROCEDURE — 25810000003 SODIUM CHLORIDE 0.9 % SOLUTION: Performed by: NURSE PRACTITIONER

## 2024-09-25 PROCEDURE — 25810000003 LACTATED RINGERS PER 1000 ML: Performed by: NEUROLOGICAL SURGERY

## 2024-09-25 PROCEDURE — 88311 DECALCIFY TISSUE: CPT | Performed by: NEUROLOGICAL SURGERY

## 2024-09-25 PROCEDURE — 25010000002 MIDAZOLAM PER 1MG: Performed by: ANESTHESIOLOGY

## 2024-09-25 PROCEDURE — 72040 X-RAY EXAM NECK SPINE 2-3 VW: CPT

## 2024-09-25 PROCEDURE — 22551 ARTHRD ANT NTRBDY CERVICAL: CPT | Performed by: NEUROLOGICAL SURGERY

## 2024-09-25 PROCEDURE — 97162 PT EVAL MOD COMPLEX 30 MIN: CPT

## 2024-09-25 PROCEDURE — 86850 RBC ANTIBODY SCREEN: CPT | Performed by: NEUROLOGICAL SURGERY

## 2024-09-25 PROCEDURE — S0260 H&P FOR SURGERY: HCPCS | Performed by: NEUROLOGICAL SURGERY

## 2024-09-25 PROCEDURE — 25010000002 FENTANYL CITRATE (PF) 250 MCG/5ML SOLUTION: Performed by: NURSE ANESTHETIST, CERTIFIED REGISTERED

## 2024-09-25 DEVICE — CAGE 5030864 ANATOMIC PTC 16X14X8MM
Type: IMPLANTABLE DEVICE | Site: SPINE CERVICAL | Status: FUNCTIONAL
Brand: ANATOMIC PEEK PTC CERVICAL FUSION SYSTEM

## 2024-09-25 DEVICE — PLATE 3003055 ZEVO 55MM 3 LVL
Type: IMPLANTABLE DEVICE | Site: SPINE CERVICAL | Status: FUNCTIONAL
Brand: ZEVO™ ANTERIOR CERVICAL PLATE SYSTEM

## 2024-09-25 DEVICE — DBM T43103 2.5CC GRAFTON PUTTY
Type: IMPLANTABLE DEVICE | Site: SPINE CERVICAL | Status: FUNCTIONAL
Brand: GRAFTON®AND GRAFTON PLUS®DEMINERALIZED BONE MATRIX (DBM)

## 2024-09-25 DEVICE — KT HEMOST ABS SURGIFOAM PORCN 1GRAM: Type: IMPLANTABLE DEVICE | Site: SPINE CERVICAL | Status: FUNCTIONAL

## 2024-09-25 RX ORDER — FENTANYL CITRATE 50 UG/ML
25 INJECTION, SOLUTION INTRAMUSCULAR; INTRAVENOUS
Status: DISCONTINUED | OUTPATIENT
Start: 2024-09-25 | End: 2024-09-25 | Stop reason: HOSPADM

## 2024-09-25 RX ORDER — OXYCODONE AND ACETAMINOPHEN 7.5; 325 MG/1; MG/1
1 TABLET ORAL EVERY 4 HOURS PRN
Status: DISCONTINUED | OUTPATIENT
Start: 2024-09-25 | End: 2024-09-26 | Stop reason: HOSPADM

## 2024-09-25 RX ORDER — PROPOFOL 10 MG/ML
INJECTION, EMULSION INTRAVENOUS AS NEEDED
Status: DISCONTINUED | OUTPATIENT
Start: 2024-09-25 | End: 2024-09-25 | Stop reason: SURG

## 2024-09-25 RX ORDER — ACETAMINOPHEN 325 MG/1
650 TABLET ORAL EVERY 4 HOURS PRN
Status: DISCONTINUED | OUTPATIENT
Start: 2024-09-25 | End: 2024-09-26 | Stop reason: HOSPADM

## 2024-09-25 RX ORDER — SODIUM CHLORIDE 0.9 % (FLUSH) 0.9 %
10 SYRINGE (ML) INJECTION AS NEEDED
Status: DISCONTINUED | OUTPATIENT
Start: 2024-09-25 | End: 2024-09-25 | Stop reason: HOSPADM

## 2024-09-25 RX ORDER — SODIUM CHLORIDE 9 MG/ML
INJECTION, SOLUTION INTRAVENOUS AS NEEDED
Status: DISCONTINUED | OUTPATIENT
Start: 2024-09-25 | End: 2024-09-25 | Stop reason: HOSPADM

## 2024-09-25 RX ORDER — ACETAMINOPHEN 160 MG/5ML
650 SOLUTION ORAL EVERY 4 HOURS PRN
Status: DISCONTINUED | OUTPATIENT
Start: 2024-09-25 | End: 2024-09-26 | Stop reason: HOSPADM

## 2024-09-25 RX ORDER — AMOXICILLIN 250 MG
2 CAPSULE ORAL 2 TIMES DAILY
Status: DISCONTINUED | OUTPATIENT
Start: 2024-09-25 | End: 2024-09-26 | Stop reason: HOSPADM

## 2024-09-25 RX ORDER — LIDOCAINE HYDROCHLORIDE 20 MG/ML
INJECTION, SOLUTION EPIDURAL; INFILTRATION; INTRACAUDAL; PERINEURAL AS NEEDED
Status: DISCONTINUED | OUTPATIENT
Start: 2024-09-25 | End: 2024-09-25 | Stop reason: SURG

## 2024-09-25 RX ORDER — DROPERIDOL 2.5 MG/ML
0.62 INJECTION, SOLUTION INTRAMUSCULAR; INTRAVENOUS ONCE AS NEEDED
Status: DISCONTINUED | OUTPATIENT
Start: 2024-09-25 | End: 2024-09-25 | Stop reason: HOSPADM

## 2024-09-25 RX ORDER — ONDANSETRON 2 MG/ML
INJECTION INTRAMUSCULAR; INTRAVENOUS AS NEEDED
Status: DISCONTINUED | OUTPATIENT
Start: 2024-09-25 | End: 2024-09-25 | Stop reason: SURG

## 2024-09-25 RX ORDER — BISACODYL 10 MG
10 SUPPOSITORY, RECTAL RECTAL DAILY PRN
Status: DISCONTINUED | OUTPATIENT
Start: 2024-09-25 | End: 2024-09-26 | Stop reason: HOSPADM

## 2024-09-25 RX ORDER — SODIUM CHLORIDE, SODIUM LACTATE, POTASSIUM CHLORIDE, CALCIUM CHLORIDE 600; 310; 30; 20 MG/100ML; MG/100ML; MG/100ML; MG/100ML
1000 INJECTION, SOLUTION INTRAVENOUS CONTINUOUS
Status: DISCONTINUED | OUTPATIENT
Start: 2024-09-25 | End: 2024-09-25

## 2024-09-25 RX ORDER — ACETAMINOPHEN 650 MG/1
650 SUPPOSITORY RECTAL EVERY 4 HOURS PRN
Status: DISCONTINUED | OUTPATIENT
Start: 2024-09-25 | End: 2024-09-26 | Stop reason: HOSPADM

## 2024-09-25 RX ORDER — LABETALOL HYDROCHLORIDE 5 MG/ML
5 INJECTION, SOLUTION INTRAVENOUS
Status: DISCONTINUED | OUTPATIENT
Start: 2024-09-25 | End: 2024-09-25 | Stop reason: HOSPADM

## 2024-09-25 RX ORDER — SODIUM CHLORIDE 0.9 % (FLUSH) 0.9 %
10 SYRINGE (ML) INJECTION AS NEEDED
Status: DISCONTINUED | OUTPATIENT
Start: 2024-09-25 | End: 2024-09-26 | Stop reason: HOSPADM

## 2024-09-25 RX ORDER — SODIUM CHLORIDE 0.9 % (FLUSH) 0.9 %
3 SYRINGE (ML) INJECTION EVERY 12 HOURS SCHEDULED
Status: DISCONTINUED | OUTPATIENT
Start: 2024-09-25 | End: 2024-09-26 | Stop reason: HOSPADM

## 2024-09-25 RX ORDER — BUPIVACAINE HYDROCHLORIDE AND EPINEPHRINE 2.5; 5 MG/ML; UG/ML
INJECTION, SOLUTION INFILTRATION; PERINEURAL AS NEEDED
Status: DISCONTINUED | OUTPATIENT
Start: 2024-09-25 | End: 2024-09-25 | Stop reason: HOSPADM

## 2024-09-25 RX ORDER — LIDOCAINE HYDROCHLORIDE 10 MG/ML
0.5 INJECTION, SOLUTION EPIDURAL; INFILTRATION; INTRACAUDAL; PERINEURAL ONCE AS NEEDED
Status: DISCONTINUED | OUTPATIENT
Start: 2024-09-25 | End: 2024-09-25 | Stop reason: HOSPADM

## 2024-09-25 RX ORDER — ONDANSETRON 2 MG/ML
4 INJECTION INTRAMUSCULAR; INTRAVENOUS EVERY 6 HOURS PRN
Status: DISCONTINUED | OUTPATIENT
Start: 2024-09-25 | End: 2024-09-26 | Stop reason: HOSPADM

## 2024-09-25 RX ORDER — IBUPROFEN 600 MG/1
600 TABLET, FILM COATED ORAL EVERY 6 HOURS PRN
Status: DISCONTINUED | OUTPATIENT
Start: 2024-09-25 | End: 2024-09-25 | Stop reason: HOSPADM

## 2024-09-25 RX ORDER — SODIUM CHLORIDE, SODIUM LACTATE, POTASSIUM CHLORIDE, CALCIUM CHLORIDE 600; 310; 30; 20 MG/100ML; MG/100ML; MG/100ML; MG/100ML
9 INJECTION, SOLUTION INTRAVENOUS CONTINUOUS
Status: DISCONTINUED | OUTPATIENT
Start: 2024-09-25 | End: 2024-09-25

## 2024-09-25 RX ORDER — NALOXONE HCL 0.4 MG/ML
0.04 VIAL (ML) INJECTION AS NEEDED
Status: DISCONTINUED | OUTPATIENT
Start: 2024-09-25 | End: 2024-09-25 | Stop reason: HOSPADM

## 2024-09-25 RX ORDER — TRAZODONE HYDROCHLORIDE 50 MG/1
50 TABLET, FILM COATED ORAL NIGHTLY
Status: DISCONTINUED | OUTPATIENT
Start: 2024-09-25 | End: 2024-09-26 | Stop reason: HOSPADM

## 2024-09-25 RX ORDER — SODIUM CHLORIDE 0.9 % (FLUSH) 0.9 %
3 SYRINGE (ML) INJECTION AS NEEDED
Status: DISCONTINUED | OUTPATIENT
Start: 2024-09-25 | End: 2024-09-25 | Stop reason: HOSPADM

## 2024-09-25 RX ORDER — FENTANYL CITRATE 50 UG/ML
INJECTION, SOLUTION INTRAMUSCULAR; INTRAVENOUS AS NEEDED
Status: DISCONTINUED | OUTPATIENT
Start: 2024-09-25 | End: 2024-09-25 | Stop reason: SURG

## 2024-09-25 RX ORDER — DEXTROSE MONOHYDRATE 25 G/50ML
12.5 INJECTION, SOLUTION INTRAVENOUS AS NEEDED
Status: DISCONTINUED | OUTPATIENT
Start: 2024-09-25 | End: 2024-09-25 | Stop reason: HOSPADM

## 2024-09-25 RX ORDER — SODIUM CHLORIDE 9 MG/ML
40 INJECTION, SOLUTION INTRAVENOUS AS NEEDED
Status: DISCONTINUED | OUTPATIENT
Start: 2024-09-25 | End: 2024-09-26 | Stop reason: HOSPADM

## 2024-09-25 RX ORDER — CYCLOBENZAPRINE HCL 10 MG
10 TABLET ORAL 3 TIMES DAILY PRN
Status: DISCONTINUED | OUTPATIENT
Start: 2024-09-25 | End: 2024-09-26 | Stop reason: HOSPADM

## 2024-09-25 RX ORDER — SODIUM CHLORIDE 9 MG/ML
75 INJECTION, SOLUTION INTRAVENOUS CONTINUOUS
Status: DISCONTINUED | OUTPATIENT
Start: 2024-09-25 | End: 2024-09-26 | Stop reason: HOSPADM

## 2024-09-25 RX ORDER — ATENOLOL 25 MG/1
25 TABLET ORAL DAILY
Status: DISCONTINUED | OUTPATIENT
Start: 2024-09-26 | End: 2024-09-26 | Stop reason: HOSPADM

## 2024-09-25 RX ORDER — NALOXONE HCL 0.4 MG/ML
0.4 VIAL (ML) INJECTION
Status: DISCONTINUED | OUTPATIENT
Start: 2024-09-25 | End: 2024-09-26 | Stop reason: ALTCHOICE

## 2024-09-25 RX ORDER — MAGNESIUM HYDROXIDE 1200 MG/15ML
LIQUID ORAL AS NEEDED
Status: DISCONTINUED | OUTPATIENT
Start: 2024-09-25 | End: 2024-09-25 | Stop reason: HOSPADM

## 2024-09-25 RX ORDER — BISACODYL 5 MG/1
5 TABLET, DELAYED RELEASE ORAL DAILY PRN
Status: DISCONTINUED | OUTPATIENT
Start: 2024-09-25 | End: 2024-09-26 | Stop reason: HOSPADM

## 2024-09-25 RX ORDER — MIDAZOLAM HYDROCHLORIDE 2 MG/2ML
1 INJECTION, SOLUTION INTRAMUSCULAR; INTRAVENOUS
Status: DISCONTINUED | OUTPATIENT
Start: 2024-09-25 | End: 2024-09-25 | Stop reason: HOSPADM

## 2024-09-25 RX ORDER — POLYETHYLENE GLYCOL 3350 17 G/17G
17 POWDER, FOR SOLUTION ORAL DAILY
Status: DISCONTINUED | OUTPATIENT
Start: 2024-09-25 | End: 2024-09-26 | Stop reason: HOSPADM

## 2024-09-25 RX ORDER — HYDROXYCHLOROQUINE SULFATE 200 MG/1
400 TABLET, FILM COATED ORAL DAILY
Status: DISCONTINUED | OUTPATIENT
Start: 2024-09-25 | End: 2024-09-26 | Stop reason: HOSPADM

## 2024-09-25 RX ORDER — ONDANSETRON 2 MG/ML
4 INJECTION INTRAMUSCULAR; INTRAVENOUS
Status: DISCONTINUED | OUTPATIENT
Start: 2024-09-25 | End: 2024-09-25 | Stop reason: HOSPADM

## 2024-09-25 RX ORDER — SUCCINYLCHOLINE/SOD CL,ISO/PF 200MG/10ML
SYRINGE (ML) INTRAVENOUS AS NEEDED
Status: DISCONTINUED | OUTPATIENT
Start: 2024-09-25 | End: 2024-09-25 | Stop reason: SURG

## 2024-09-25 RX ORDER — HYDROMORPHONE HYDROCHLORIDE 1 MG/ML
0.5 INJECTION, SOLUTION INTRAMUSCULAR; INTRAVENOUS; SUBCUTANEOUS
Status: DISCONTINUED | OUTPATIENT
Start: 2024-09-25 | End: 2024-09-25 | Stop reason: HOSPADM

## 2024-09-25 RX ORDER — HYDROMORPHONE HYDROCHLORIDE 1 MG/ML
0.25 INJECTION, SOLUTION INTRAMUSCULAR; INTRAVENOUS; SUBCUTANEOUS EVERY 4 HOURS PRN
Status: DISCONTINUED | OUTPATIENT
Start: 2024-09-25 | End: 2024-09-26 | Stop reason: ALTCHOICE

## 2024-09-25 RX ORDER — SODIUM CHLORIDE 0.9 % (FLUSH) 0.9 %
10 SYRINGE (ML) INJECTION EVERY 12 HOURS SCHEDULED
Status: DISCONTINUED | OUTPATIENT
Start: 2024-09-25 | End: 2024-09-25 | Stop reason: HOSPADM

## 2024-09-25 RX ORDER — FLUMAZENIL 0.1 MG/ML
0.2 INJECTION INTRAVENOUS AS NEEDED
Status: DISCONTINUED | OUTPATIENT
Start: 2024-09-25 | End: 2024-09-25 | Stop reason: HOSPADM

## 2024-09-25 RX ORDER — OXYCODONE AND ACETAMINOPHEN 10; 325 MG/1; MG/1
1 TABLET ORAL EVERY 4 HOURS PRN
Status: DISCONTINUED | OUTPATIENT
Start: 2024-09-25 | End: 2024-09-25 | Stop reason: HOSPADM

## 2024-09-25 RX ORDER — FENTANYL CITRATE 50 UG/ML
50 INJECTION, SOLUTION INTRAMUSCULAR; INTRAVENOUS
Status: DISCONTINUED | OUTPATIENT
Start: 2024-09-25 | End: 2024-09-25 | Stop reason: HOSPADM

## 2024-09-25 RX ADMIN — OXYCODONE HYDROCHLORIDE AND ACETAMINOPHEN 1 TABLET: 7.5; 325 TABLET ORAL at 12:44

## 2024-09-25 RX ADMIN — OXYCODONE HYDROCHLORIDE AND ACETAMINOPHEN 1 TABLET: 7.5; 325 TABLET ORAL at 17:50

## 2024-09-25 RX ADMIN — Medication 20 MG: at 07:10

## 2024-09-25 RX ADMIN — CEFAZOLIN 2000 MG: 2 INJECTION, POWDER, FOR SOLUTION INTRAMUSCULAR; INTRAVENOUS at 15:04

## 2024-09-25 RX ADMIN — DOCUSATE SODIUM 50 MG AND SENNOSIDES 8.6 MG 2 TABLET: 8.6; 5 TABLET, FILM COATED ORAL at 21:50

## 2024-09-25 RX ADMIN — Medication 30 MG: at 07:52

## 2024-09-25 RX ADMIN — TRAZODONE HYDROCHLORIDE 50 MG: 50 TABLET ORAL at 21:50

## 2024-09-25 RX ADMIN — PROPOFOL INJECTABLE EMULSION 30 MG: 10 INJECTION, EMULSION INTRAVENOUS at 07:52

## 2024-09-25 RX ADMIN — CEFAZOLIN 2000 MG: 2 INJECTION, POWDER, FOR SOLUTION INTRAMUSCULAR; INTRAVENOUS at 23:49

## 2024-09-25 RX ADMIN — SODIUM CHLORIDE 75 ML/HR: 9 INJECTION, SOLUTION INTRAVENOUS at 10:55

## 2024-09-25 RX ADMIN — Medication 3 ML: at 21:56

## 2024-09-25 RX ADMIN — PROPOFOL INJECTABLE EMULSION 150 MCG/KG/MIN: 10 INJECTION, EMULSION INTRAVENOUS at 07:16

## 2024-09-25 RX ADMIN — OXYCODONE HYDROCHLORIDE AND ACETAMINOPHEN 1 TABLET: 7.5; 325 TABLET ORAL at 21:49

## 2024-09-25 RX ADMIN — POLYETHYLENE GLYCOL 3350 17 G: 17 POWDER, FOR SOLUTION ORAL at 15:04

## 2024-09-25 RX ADMIN — LIDOCAINE HYDROCHLORIDE 100 MG: 20 INJECTION, SOLUTION EPIDURAL; INFILTRATION; INTRACAUDAL; PERINEURAL at 07:14

## 2024-09-25 RX ADMIN — ONDANSETRON 4 MG: 2 INJECTION INTRAMUSCULAR; INTRAVENOUS at 09:37

## 2024-09-25 RX ADMIN — FENTANYL CITRATE 100 MCG: 0.05 INJECTION, SOLUTION INTRAMUSCULAR; INTRAVENOUS at 07:36

## 2024-09-25 RX ADMIN — SODIUM CHLORIDE, POTASSIUM CHLORIDE, SODIUM LACTATE AND CALCIUM CHLORIDE 1000 ML: 600; 310; 30; 20 INJECTION, SOLUTION INTRAVENOUS at 06:02

## 2024-09-25 RX ADMIN — SODIUM CHLORIDE, POTASSIUM CHLORIDE, SODIUM LACTATE AND CALCIUM CHLORIDE 1000 ML: 600; 310; 30; 20 INJECTION, SOLUTION INTRAVENOUS at 06:03

## 2024-09-25 RX ADMIN — CEFAZOLIN 2 G: 2 INJECTION, POWDER, FOR SOLUTION INTRAMUSCULAR; INTRAVENOUS at 07:28

## 2024-09-25 RX ADMIN — MIDAZOLAM HYDROCHLORIDE 1 MG: 1 INJECTION, SOLUTION INTRAMUSCULAR; INTRAVENOUS at 07:01

## 2024-09-25 RX ADMIN — DOCUSATE SODIUM 50 MG AND SENNOSIDES 8.6 MG 2 TABLET: 8.6; 5 TABLET, FILM COATED ORAL at 15:04

## 2024-09-25 RX ADMIN — Medication 100 MG: at 07:14

## 2024-09-25 RX ADMIN — HYDROXYCHLOROQUINE SULFATE 400 MG: 200 TABLET ORAL at 15:05

## 2024-09-25 RX ADMIN — FENTANYL CITRATE 100 MCG: 0.05 INJECTION, SOLUTION INTRAMUSCULAR; INTRAVENOUS at 07:48

## 2024-09-25 RX ADMIN — FENTANYL CITRATE 150 MCG: 0.05 INJECTION, SOLUTION INTRAMUSCULAR; INTRAVENOUS at 07:14

## 2024-09-25 RX ADMIN — PROPOFOL INJECTABLE EMULSION 150 MG: 10 INJECTION, EMULSION INTRAVENOUS at 07:14

## 2024-09-25 NOTE — PLAN OF CARE
"Goal Outcome Evaluation:  Plan of Care Reviewed With: patient, family           Outcome Evaluation: PT eval complete. Pt in fowlers position, asleep and lethargic but easily awaken and Orientedx4. Pt reports independence at baseline prior to surgery but occasionally required assist from spouse for dressing. Today pt performed bed mobility with CGA and verbal cues. Pt educated on C Collar wear and spinal precautions prior to moving OOB and verbalized understanding. Pt demo functional AROM in B LE and demo functional LE strength with gait and t/f's today. Pt ambulated 125' with Min A for equipment and reported he feels \"improvement already.\" Pt returned to room and left sitting EOB with family at bedside and RN notified. pt will benefit from skilled PT services for continued education, mobility with decreased pain and stair safety with C collar in place. Recommend home with assist when medically stable.      Anticipated Discharge Disposition (PT): home with assist                        "

## 2024-09-25 NOTE — PROGRESS NOTES
Rigid cervical collar has been ordered to reduce pain by restricting mobility and to facilitate healing following a surgical procedure of the spine.

## 2024-09-25 NOTE — OP NOTE
Procedure Note  Preop Diagnosis: Spinal stenosis in cervical region [M48.02]  Degeneration of cervical intervertebral disc [M50.30]    Post-Op Diagnosis Codes:     * Spinal stenosis in cervical region [M48.02]     * Degeneration of cervical intervertebral disc [M50.30]     Procedure Name:C4/5, C5/6, and C6/7 anterior discectomy  C4/5, C5/6, and C6/7 anterior interbody fusion with allograft  C4/5, C5/6, and C6/7 partial corpectomies less than 50%  C4/5, C5/6, and C6/7 anterior instrumented fusion  Using the operative microscope    Spinal Surgery Levels Completed:3 Levels      Indications:  A MRI revealed findings of Cervical Spinal Stenosis . The patient now presents for anterior cervical discectomy and fusion of C4/5, C5/6, and C6/7 after discussing therapeutic alternatives.          Surgeon: Cj Daley MD     Assistants: none    Anesthesia: General endotracheal anesthesia    ASA Class: 3    Procedure Details   After obtaining informed consent, having the risks and benefits of the procedure explained including but not limited to infection, bleeding, paralysis, spinal fluid leak, speech and swallow difficulty, stroke, coma, and death, the patient was brought to the operating room.  The patient was given general anesthesia via an endotracheal tube.  The patient was laid supine on the operating table.  The patient was placed in an occipital mandibular head harness and 5 pounds of axial traction.  A preplanned incision in the right anterior portion of the neck was marked with indelible marker.  Fluoroscopy confirmed the correct level of C4/5, C5/6, and C6/7.  The patient was then prepped and draped in a standard sterile fashion.  The preplanned incision was infiltrated with Marcaine and epinephrine.  A 10 blade scalpel was used to make an incision to the dermis and epidermis.  Bovie cautery was used to extend the incision down to the subcutaneous and soft tissues to the level of the platysma.  The platysma was  then split longitudinally using Metzenbaum scissors.  The connective tissue plane lateral to the esophagus and trachea medial to the carotid artery was then developed using blunt and sharp dissection.  Once the anterior portion of the vertebral bodies were identified a bent spinal needle was placed into the disc space at C4/5, C5/6, and C6/7.  Fluoroscopy confirmed this was the correct level.  The longus colli were then dissected off the anterior lateral portions of the vertebral bodies using Bovie cautery.  The Shadow-Line retractor system was then placed into the wound.  At this point the operative microscope was brought in.  Discectomies at each level were then conducted using a 15 blade scalpel to incise the annulus then a series of up-biting curettes, pituitary rongeurs and a 5 mm barrel bit were used.  Once the discectomy was completed the posterior longitudinal ligament at each level was identified. It was bluntly dissected using a nerve hook.  The PLL was then incised using a 1 mm Kerrison.  Bilateral foraminotomies at each level were then conducted using a 1.5 mm Kerrison.  Partial corpectomies of the posterior portion of the vertebral bodies of C4/5, C5/6, and C6/7 were then done using a 2 mm Kerrison.  Once this was completed an 8 mm peek interbody spacer was tapped into place at C4-5, 7 mm peek interbody spacer was tapped into place at C5-6 and a 7 mm peek interbody spacer was tapped into place at C6-7.   The spacers were filled with allograft.  An anterior cervical plate was then sized and placed over the anterior portions of the vertebral bodies and a series of 15 mm and 17 mm self drilling self-tapping screws were placed into the vertebral bodies.  Final AP and lateral fluoroscopy showed good position of all interbody devices and hardware.  The wound was then copiously irrigated with antibiotic solution.  The wound was inspected for hemostasis.  The subcutaneous tissues were then closed using a series  of inverted interrupted 2-0 Vicryl sutures.  The skin was closed using running 4-0 Monocryl subcuticular.  All sponge, needle and instrument counts were correct at the end of the procedure.  The patient was extubated in stable condition and returned to recovery room with about 200 mL of blood loss.        Findings:  Cervical Degenerative Disc Disease    Estimated Blood Loss:  200ml           Drains: none           Total IV Fluids: ml           Specimens:            Implants:   Implant Name Type Inv. Item Serial No.  Lot No. LRB No. Used Action   KT HEMOST ABS SURGIFOAM PORCN 1GRAM - [id number] Implant KT HEMOST ABS SURGIFOAM PORCN 1GRAM  [employer]  DIV OF J AND J [id number] N/A 1 Implanted   PUTTY DBM GILBERT 2.5CC - [id number] Implant PUTTY DBM GILBERT 2.5CC  [employer] [id number] N/A 1 Implanted   CAGE CERV ANATOMIC PTC [id number] - [id number] Implant CAGE CERV ANATOMIC PTC [id number]  [employer] 96RE N/A 1 Implanted   CAGE CERV ANATOMIC PTC [id number] - [id number] Implant CAGE CERV ANATOMIC PTC [id number]  [employer] 14RL N/A 1 Implanted   CAGE CERV ANATOMIC PTC [id number] - [id number] Implant CAGE CERV ANATOMIC PTC [id number]  [employer] 30RN N/A 1 Implanted   SCRW ZEVO 2THRD SD VA 3.5X15MM - [id number] Implant SCRW ZEVO 2THRD SD VA 3.5X15MM  [employer]  N/A 4 Implanted   SCRW CERV ANT ZEVO SD VA 3.5X17MM - [id number] Implant SCRW CERV ANT ZEVO SD VA 3.5X17MM  [employer]  N/A 4 Implanted   PLT ACP ZEVO 3LVL 55MM NS - [id number] Implant PLT ACP ZEVO 3LVL 55MM NS  [employer]  N/A 1 Implanted              Complications:  none           Disposition: PACU - hemodynamically stable.           Condition: stable        Cj Daley MD

## 2024-09-25 NOTE — PLAN OF CARE
Goal Outcome Evaluation:   Received pt from PACU, c-collar in place, drsg to R ant. neck CDI, sesay in place,  weak, extremely drowsy, IVF's and IV antibx's infusing, PT/OT eval done once awake and Pt doing well ambulated in [place], sitting at side of bed, visiting with family at bedside, plan of care ongoing.
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
## 2024-09-26 VITALS
DIASTOLIC BLOOD PRESSURE: 64 MMHG | WEIGHT: 184.53 LBS | HEART RATE: 76 BPM | OXYGEN SATURATION: 95 % | SYSTOLIC BLOOD PRESSURE: 105 MMHG | RESPIRATION RATE: 14 BRPM | TEMPERATURE: 98.1 F | BODY MASS INDEX: 28.62 KG/M2

## 2024-09-26 PROBLEM — M48.02 CERVICAL SPINAL STENOSIS: Status: ACTIVE | Noted: 2024-09-26

## 2024-09-26 LAB
ANION GAP SERPL CALCULATED.3IONS-SCNC: 7 MMOL/L (ref 5–15)
BASOPHILS # BLD AUTO: 0.02 10*3/MM3 (ref 0–0.2)
BASOPHILS NFR BLD AUTO: 0.3 % (ref 0–1.5)
BUN SERPL-MCNC: 13 MG/DL (ref 6–20)
BUN/CREAT SERPL: 13.3 (ref 7–25)
CALCIUM SPEC-SCNC: 8.3 MG/DL (ref 8.6–10.5)
CHLORIDE SERPL-SCNC: 102 MMOL/L (ref 98–107)
CO2 SERPL-SCNC: 27 MMOL/L (ref 22–29)
CREAT SERPL-MCNC: 0.98 MG/DL (ref 0.76–1.27)
DEPRECATED RDW RBC AUTO: 41.1 FL (ref 37–54)
EGFRCR SERPLBLD CKD-EPI 2021: 88.8 ML/MIN/1.73
EOSINOPHIL # BLD AUTO: 0.08 10*3/MM3 (ref 0–0.4)
EOSINOPHIL NFR BLD AUTO: 1 % (ref 0.3–6.2)
ERYTHROCYTE [DISTWIDTH] IN BLOOD BY AUTOMATED COUNT: 12.8 % (ref 12.3–15.4)
GLUCOSE SERPL-MCNC: 102 MG/DL (ref 65–99)
HCT VFR BLD AUTO: 34.4 % (ref 37.5–51)
HGB BLD-MCNC: 11.8 G/DL (ref 13–17.7)
IMM GRANULOCYTES # BLD AUTO: 0.02 10*3/MM3 (ref 0–0.05)
IMM GRANULOCYTES NFR BLD AUTO: 0.3 % (ref 0–0.5)
LYMPHOCYTES # BLD AUTO: 1.93 10*3/MM3 (ref 0.7–3.1)
LYMPHOCYTES NFR BLD AUTO: 24.5 % (ref 19.6–45.3)
MCH RBC QN AUTO: 30 PG (ref 26.6–33)
MCHC RBC AUTO-ENTMCNC: 34.3 G/DL (ref 31.5–35.7)
MCV RBC AUTO: 87.5 FL (ref 79–97)
MONOCYTES # BLD AUTO: 1.08 10*3/MM3 (ref 0.1–0.9)
MONOCYTES NFR BLD AUTO: 13.7 % (ref 5–12)
NEUTROPHILS NFR BLD AUTO: 4.76 10*3/MM3 (ref 1.7–7)
NEUTROPHILS NFR BLD AUTO: 60.2 % (ref 42.7–76)
NRBC BLD AUTO-RTO: 0 /100 WBC (ref 0–0.2)
PLATELET # BLD AUTO: 175 10*3/MM3 (ref 140–450)
PMV BLD AUTO: 8.7 FL (ref 6–12)
POTASSIUM SERPL-SCNC: 4.2 MMOL/L (ref 3.5–5.2)
RBC # BLD AUTO: 3.93 10*6/MM3 (ref 4.14–5.8)
SODIUM SERPL-SCNC: 136 MMOL/L (ref 136–145)
WBC NRBC COR # BLD AUTO: 7.89 10*3/MM3 (ref 3.4–10.8)

## 2024-09-26 PROCEDURE — 80048 BASIC METABOLIC PNL TOTAL CA: CPT | Performed by: NURSE PRACTITIONER

## 2024-09-26 PROCEDURE — 97165 OT EVAL LOW COMPLEX 30 MIN: CPT | Performed by: OCCUPATIONAL THERAPIST

## 2024-09-26 PROCEDURE — 97530 THERAPEUTIC ACTIVITIES: CPT

## 2024-09-26 PROCEDURE — 97116 GAIT TRAINING THERAPY: CPT

## 2024-09-26 PROCEDURE — 85025 COMPLETE CBC W/AUTO DIFF WBC: CPT | Performed by: NURSE PRACTITIONER

## 2024-09-26 PROCEDURE — 99024 POSTOP FOLLOW-UP VISIT: CPT | Performed by: NURSE PRACTITIONER

## 2024-09-26 RX ORDER — CYCLOBENZAPRINE HCL 10 MG
10 TABLET ORAL 3 TIMES DAILY PRN
Qty: 45 TABLET | Refills: 1 | Status: SHIPPED | OUTPATIENT
Start: 2024-09-26

## 2024-09-26 RX ORDER — OXYCODONE AND ACETAMINOPHEN 7.5; 325 MG/1; MG/1
1 TABLET ORAL EVERY 6 HOURS PRN
Start: 2024-09-26

## 2024-09-26 RX ADMIN — POLYETHYLENE GLYCOL 3350 17 G: 17 POWDER, FOR SOLUTION ORAL at 08:19

## 2024-09-26 RX ADMIN — OXYCODONE HYDROCHLORIDE AND ACETAMINOPHEN 1 TABLET: 7.5; 325 TABLET ORAL at 08:19

## 2024-09-26 RX ADMIN — ATENOLOL 25 MG: 25 TABLET ORAL at 08:19

## 2024-09-26 RX ADMIN — DOCUSATE SODIUM 50 MG AND SENNOSIDES 8.6 MG 2 TABLET: 8.6; 5 TABLET, FILM COATED ORAL at 08:19

## 2024-09-26 RX ADMIN — Medication 3 ML: at 08:20

## 2024-09-26 RX ADMIN — HYDROXYCHLOROQUINE SULFATE 400 MG: 200 TABLET ORAL at 08:19

## 2024-09-26 RX ADMIN — OXYCODONE HYDROCHLORIDE AND ACETAMINOPHEN 1 TABLET: 7.5; 325 TABLET ORAL at 04:08
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
## 2024-09-26 NOTE — PLAN OF CARE
Goal Outcome Evaluation:  Plan of Care Reviewed With: patient        Progress: improving  Outcome Evaluation: OT eval completed. Pt presents alert and oriented x4, sitting up in bed with c-collar donned. Pt was educated on spinal precuations, c-collar fitting/mgt/wear schdeule and adl modifications. He was able to bring self to sitting at EOB with Mod I. C-collar doffed for education and re-donned with Min A and vcs. Pt provided with extra set of pads and instuctions on care. He performs sit <> stand t/f and all functional mobility with Sup only. At this time, pt does not display deficits which require skilled OT services and pt reports no questions at this time. OT to sign off and anticipate d/c home with medically able.      Anticipated Discharge Disposition (OT): home with assist

## 2024-09-26 NOTE — THERAPY TREATMENT NOTE
Acute Care - Physical Therapy Treatment Note  [facility]     Patient Name: Lars Rand  : 1965  MRN: [id number]  Today's Date: 2024      Visit Dx:     ICD-10-CM ICD-9-CM   1. Impaired mobility [Z74.09]  Z74.09 799.89   2. Spinal stenosis in cervical region  M48.02 723.0   3. Degeneration of cervical intervertebral disc  M50.30 722.4     Patient Active Problem List   Diagnosis    Primary hypertension    Rheumatoid arthritis involving multiple sites with positive rheumatoid factor    Benign prostatic hyperplasia without lower urinary tract symptoms    Chronic neck pain    Mixed sleep apnea    Degenerative disc disease, cervical    Spinal stenosis in cervical region    Degeneration of cervical intervertebral disc    Cervical stenosis of spinal canal    Cervical spinal stenosis     Past Medical History:   Diagnosis Date    Arthritis     Erectile dysfunction 2023    GERD (gastroesophageal reflux disease)     Heart attack     Hypertension     Low back pain     Sleep apnea     Visual impairment     Rosie-Parkinson-White (WPW) pattern      Past Surgical History:   Procedure Laterality Date    ANTERIOR CERVICAL DISCECTOMY W/ FUSION N/A 2024    Procedure: CERVICAL DISCECTOMY ANTERIOR WITH FUSION C4-5,5-6,6-7;  Surgeon: Cj Daley MD;  Location: [facility];  Service: Neurosurgery;  Laterality: N/A;    TONSILECTOMY, ADENOIDECTOMY, BILATERAL MYRINGOTOMY AND TUBES       PT Assessment (Last 12 Hours)       PT Evaluation and Treatment       Row Name 24 0991          Physical Therapy Time and Intention    Subjective Information no complaints  -TB     Document Type therapy note (daily note)  -TB     Mode of Treatment physical therapy  -TB       Row Name 24 3436          General Information    Existing Precautions/Restrictions cervical collar;brace on at all times;fall  -TB       Row Name 24 0940          Bed Mobility    Bed Mobility scooting/bridging;supine-sit;sit-supine  -TB      Scooting/Bridging Success (Bed Mobility) modified independence  -TB     Supine-Sit Success (Bed Mobility) modified independence  -TB     Sit-Supine Success (Bed Mobility) modified independence  -TB     Assistive Device (Bed Mobility) head of bed elevated  -TB       Row Name 09/26/24 0940          Transfers    Transfers sit-stand transfer;stand-sit transfer  -TB       Row Name 09/26/24 0940          Sit-Stand Transfer    Sit-Stand Success (Transfers) independent  -TB       Row Name 09/26/24 0940          Stand-Sit Transfer    Stand-Sit Success (Transfers) independent  -TB       Row Name 09/26/24 0940          Gait/Stairs (Locomotion)    Success Level (Gait) supervision;standby assist  -TB     Distance in Feet (Gait) 400  -TB     Success Level (Stairs) stand by assist  -TB     Handrail Location (Stairs) right side (ascending);right side (descending)  -TB     Number of Steps (Stairs) 9  -TB     Ascending Technique (Stairs) step-to-step  -TB     Descending Technique (Stairs) step-to-step  -TB     Comment, (Gait/Stairs) Cues for safety  -TB       Row Name 09/26/24 0940          Balance    Balance Assessment sitting static balance;sitting dynamic balance  -TB     Static Sitting Balance independent  -TB     Dynamic Sitting Balance independent  -TB     Position, Sitting Balance sitting edge of bed  -TB       Row Name             Wound 09/25/24 0755 anterior neck Incision    Wound - Properties Group Placement Date: 09/25/24  -ALIYAH Placement Time: 0755 -ALIYAH Orientation: anterior  -AILYAH Location: neck  -ALIYAH Primary Wound Type: Incision  -ALIYAH    Retired Wound - Properties Group Placement Date: 09/25/24  -ALIYAH Placement Time: 0755  -ALIYAH Orientation: anterior  -ALIYAH Location: neck  -ALIYAH Primary Wound Type: Incision  -ALIYAH    Retired Wound - Properties Group Date first assessed: 09/25/24  -ALIYAH Time first assessed: 0755  -ALIYAH Location: neck  -ALIYAH Primary Wound Type: Incision  -ALIYAH      Row Name 09/26/24 0940           Plan of Care Review    Plan of Care Reviewed With patient;spouse  -TB     Progress improving  -TB     Outcome Evaluation PT tx complete. Pt w/ minor c/o soreness tis morning. Bed mob Mod Ind to EOB. Reviewed spinal precautions, brace wear/cleaning, home safety, and mobility. Stood Ind. Amb 400' w/ SBA/Supervision. Went up/down 9 steps w/ SBA and use of handrail. Pt and spouse feel good about d/c.  -TB       Row Name 09/26/24 0940          Positioning and Restraints    Pre-Treatment Position in bed  -TB     Post Treatment Position bed  -TB     In Bed fowlers;call light within reach;encouraged to call for assist;with family/caregiver;side rails up x2  -TB               User Key  (r) = Recorded By, (t) = Taken By, (c) = Cosigned By      Initials Name Provider Type    Kobe Caldera, RN Registered Nurse    TB Ozzei Keller, PTA Physical Therapist Assistant                    Physical Therapy Education       Title: PT OT SLP Therapies (In Progress)       Topic: Physical Therapy (Done)       Point: Mobility training (Done)       Learning Progress Summary             Patient Acceptance, E, VU by AJ at 9/25/2024 1400    Comment: role of PT, spinal precautions, c collar wear, POC   Family Acceptance, E, VU by AJ at 9/25/2024 1400    Comment: role of PT, spinal precautions, c collar wear, POC                         Point: Home exercise program (Done)       Learning Progress Summary             Patient Acceptance, E, VU by AJ at 9/25/2024 1400    Comment: role of PT, spinal precautions, c collar wear, POC   Family Acceptance, E, VU by AJ at 9/25/2024 1400    Comment: role of PT, spinal precautions, c collar wear, POC                         Point: Body mechanics (Done)       Learning Progress Summary             Patient Acceptance, E, VU by AJ at 9/25/2024 1400    Comment: role of PT, spinal precautions, c collar wear, POC   Family Acceptance, E, VU by AJ at 9/25/2024 1400    Comment: role of PT, spinal  precautions, c collar wear, POC                         Point: Precautions (Done)       Learning Progress Summary             Patient Acceptance, E, VU by DIGNA at 9/25/2024 1400    Comment: role of PT, spinal precautions, c collar wear, POC   Family Acceptance, E, VU by DIGNA at 9/25/2024 1400    Comment: role of PT, spinal precautions, c collar wear, POC                                         User Key       Initials Effective Dates Name Provider Type [facility] 08/15/24 -  Jj Murray, PT DPT Physical Therapist PT                  PT Recommendation and Plan     Plan of Care Reviewed With: patient, spouse  Progress: improving  Outcome Evaluation: PT tx complete. Pt w/ minor c/o soreness tis morning. Bed mob Mod Ind to EOB. Reviewed spinal precautions, brace wear/cleaning, home safety, and mobility. Stood Ind. Amb 400' w/ SBA/Supervision. Went up/down 9 steps w/ SBA and use of handrail. Pt and spouse feel good about d/c.       Time Calculation:    PT Charges       Row Name 09/26/24 1142             Time Calculation    Start Time 0940  -TB      Stop Time 1008  -TB      Time Calculation (min) 28 min  -TB      PT Received On 09/26/24  -TB         Time Calculation- PT    Total Timed Code Minutes- PT 28 minute(s)  -TB                User Key  (r) = Recorded By, (t) = Taken By, (c) = Cosigned By      Initials Name Provider Type    TB Ozzie Keller PTA Physical Therapist Assistant                  Therapy Charges for Today       Code Description Service Date Service Provider Modifiers Qty    05061832600 HC GAIT TRAINING EA 15 MIN 9/26/2024 Ozzie Keller PTA GP 2            PT G-Codes  Outcome Measure Options: AM-PAC 6 Clicks Daily Activity (OT)  AM-PAC 6 Clicks Score (PT): 21  AM-PAC 6 Clicks Score (OT): 24    Ozzie Keller PTA  9/26/2024

## 2024-09-26 NOTE — PLAN OF CARE
Goal Outcome Evaluation:           Progress: improving  Outcome Evaluation: Pt A&OX4. VSS on RA, Eco2 monitored. C/O pain managed W/ PRN meds. Incision to Rt anterior neck C/D/I. C collar in place. Rt FA and Lt FA infusing NS @ 75 ml/hr. Call light within reach, safety maintained.

## 2024-09-26 NOTE — DISCHARGE SUMMARY
Date of Discharge:  9/26/2024    Discharge Diagnosis: Spinal stenosis in cervical region [M48.02]  Degeneration of cervical intervertebral disc [M50.30]  Cervical stenosis of spinal canal [M48.02]  Cervical spinal stenosis [M48.02]    Presenting Problem/History of Present Illness  Spinal stenosis in cervical region [M48.02]  Degeneration of cervical intervertebral disc [M50.30]  Cervical stenosis of spinal canal [M48.02]  Cervical spinal stenosis [M48.02]       Hospital Course  Patient is a 59 y.o. male presented with Spinal stenosis in cervical region [M48.02]  Degeneration of cervical intervertebral disc [M50.30]  Cervical stenosis of spinal canal [M48.02]  Cervical spinal stenosis [M48.02].  The patient has been through all manner of conservative care without any meaningful improvement. The patient went to the operating room on September 25, 2024 for a C4-5, C5-6, C6-7 ACDF.  The patient tolerated procedure well.  Currently the patient is ambulating.  The patient is tolerating p.o.  The patient is voiding spontaneously.  It is felt that at this time the patient is stable and suitable to be discharged home.  See orders for discharge instructions and restrictions.  The patient can take the dressing off in 72 hours and can get the wound wet at that time.  The patient is to clean the wound daily with soap and water.  They are to call the office with any drainage from the incision or worsening pain.  He is not to take any NSAIDs.  He is wear the cervical collar at all times.  He does have Percocet at home and can increase the dose to 1 Percocet every 6 hours as needed for pain.    Procedures Performed  Procedure(s):  CERVICAL DISCECTOMY ANTERIOR WITH FUSION C4-5,5-6,6-7       Consults:   Consults       No orders found for last 30 day(s).              Condition on Discharge: Stable    Vital Signs  Temp:  [97.7 °F (36.5 °C)-98.8 °F (37.1 °C)] 98.1 °F (36.7 °C)  Heart Rate:  [76-88] 76  Resp:  [12-18] 14  BP:  (105-143)/(64-81) 105/64    Physical Exam:   Physical Exam  Constitutional:       General: He is awake.      Appearance: He is well-developed.   HENT:      Head: Normocephalic.   Eyes:      Extraocular Movements: Extraocular movements intact.      Pupils: Pupils are equal, round, and reactive to light.   Pulmonary:      Effort: Pulmonary effort is normal.   Musculoskeletal:         General: Normal range of motion.      Cervical back: Normal range of motion.   Skin:     General: Skin is warm.   Neurological:      Mental Status: He is alert and oriented to person, place, and time.      GCS: GCS eye subscore is 4. GCS verbal subscore is 5. GCS motor subscore is 6.      Cranial Nerves: No cranial nerve deficit.      Sensory: No sensory deficit.      Motor: Motor strength is normal.     Gait: Gait normal.      Deep Tendon Reflexes: Reflexes are normal and symmetric.   Psychiatric:         Speech: Speech normal.         Behavior: Behavior normal.         Thought Content: Thought content normal.          Neurological Exam  Mental Status  Awake and alert. Oriented to person, place and time. Oriented to person, place, and time. Speech is normal. Language is fluent with no aphasia. Attention and concentration are normal.    Cranial Nerves  CN I: Sense of smell is normal.  CN II: Right normal visual field. Left normal visual field.  CN III, IV, VI: Extraocular movements intact bilaterally. Pupils equal round and reactive to light bilaterally.  CN V: Facial sensation is normal.  CN VII:  Right: There is no facial weakness.  Left: There is no facial weakness.  CN XI: Shoulder shrug strength is normal.  CN XII: Tongue midline without atrophy or fasciculations.    Motor  Normal muscle bulk throughout. Normal muscle tone. Strength is 5/5 throughout all four extremities.    Sensory  Sensation is intact to light touch, pinprick, vibration and proprioception in all four extremities.    Reflexes  Deep tendon reflexes are 2+ and  symmetric in all four extremities.    Gait  Normal casual, toe, heel and tandem gait. Normal gait.         Discharge Disposition  Home or Self Care    Discharge Medications     Discharge Medications        New Medications        Instructions Start Date   cyclobenzaprine 10 MG tablet  Commonly known as: FLEXERIL   10 mg, Oral, 3 Times Daily PRN      naloxone 4 MG/0.1ML nasal spray  Commonly known as: NARCAN   Call 911. Don't prime. [place] in 1 nostril for overdose. Repeat in 2-3 minutes in other nostril if no or minimal breathing/responsiveness.             Changes to Medications        Instructions Start Date   oxyCODONE-acetaminophen 7.5-325 MG per tablet  Commonly known as: Percocet  What changed: when to take this   1 tablet, Oral, Every 6 Hours PRN             Continue These Medications        Instructions Start Date   atenolol 25 MG tablet  Commonly known as: TENORMIN   25 mg, Oral, Daily      folic acid 1 MG tablet  Commonly known as: FOLVITE   1,000 mcg, Oral, Daily      hydroxychloroquine 200 MG tablet  Commonly known as: PLAQUENIL   400 mg, Oral, Daily      sildenafil 100 MG tablet  Commonly known as: VIAGRA   100 mg, Oral, Daily PRN      traZODone 50 MG tablet  Commonly known as: DESYREL   50 mg, Oral, Nightly             Stop These Medications      diclofenac 75 MG EC tablet  Commonly known as: VOLTAREN              Discharge Diet:   Diet Instructions       Diet: Regular/House Diet; Regular Texture (IDDSI 7); Thin (IDDSI 0)      Discharge Diet: Regular/House Diet    Texture: Regular Texture (IDDSI 7)    Fluid Consistency: Thin (IDDSI 0)            Activity at Discharge:   Activity Instructions       Other Instructions (Specify)      Activity Instructions: No strenuous activity, no driving, wear cervical collar at all times.  No NSAIDs            Follow-up Appointments  Future Appointments   Date Time Provider Department Center   10/14/2024 11:00 AM Óscar Lehman MD [facility]   10/17/2024   9:45 AM Emmy Granados APRN [facility]   12/17/2024  4:15 PM Cj Daley MD [facility]     Additional Instructions for the Follow-ups that You Need to Schedule       Call MD With Problems / Concerns   As directed      Instructions: Worsening neck or arm pain, drainage from wound, fever, difficulty breathing or swallowing.    Order Comments: Instructions: Worsening neck or arm pain, drainage from wound, fever, difficulty breathing or swallowing.         Discharge Follow-up with Specialty: emmy granados np; 3 Weeks   As directed      Specialty: emmy granados np   Follow Up: 3 Weeks                Test Results Pending at Discharge  Pending Labs       Order Current Status    Tissue Pathology Exam In process             HAYDER Kelsey  09/26/24  14:01 CDT    Time: Discharge 20 min

## 2024-09-26 NOTE — PLAN OF CARE
Goal Outcome Evaluation:   Received pt from PACU, c-collar in place, drsg to R ant. neck CDI, voiding without difficulty, PT worked with pt., and Pt doing well ambulated in [place], sitting at side of bed, prn given with effectiveness for pain, visiting with family at bedside,D/C order received, D/C education completed, pt d/c to home. 1515- pt wheeled out via wheelchair with spouse and meds from pharmacy.

## 2024-09-26 NOTE — PROGRESS NOTES
Lars Rand  59 y.o.      Chief complaint:   Neck pain status post ACDF    Subjective  No events overnight    Temp:  [97.4 °F (36.3 °C)-98.8 °F (37.1 °C)] 97.9 °F (36.6 °C)  Heart Rate:  [] 88  Resp:  [12-18] 12  BP: ()/(54-81) 127/72      Objective:  General Appearance:  Comfortable, well-appearing, in no acute distress and in pain.    Vital signs: (most recent): Blood pressure 127/72, pulse 88, temperature 97.9 °F (36.6 °C), temperature source Oral, resp. rate 12, weight 83.7 kg (184 lb 8.4 oz), SpO2 94%.  Vital signs are normal.  No fever.    Output: Producing urine.    HEENT: Normal HEENT exam.    Lungs:  Normal effort and normal respiratory rate.  He is not in respiratory distress.    Heart: Normal rate.  Regular rhythm.    Chest: Symmetric chest wall expansion.   Extremities: Normal range of motion.    Skin:  Warm and dry.    Abdomen: Abdomen is soft and non-distended.  Bowel sounds are normal.   There is no abdominal tenderness.     Pulses: Distal pulses are intact.        Neurological Exam  Mental Status  Awake. Oriented to person, place and time. Speech is normal. Language is fluent with no aphasia. Attention and concentration are normal.    Cranial Nerves  CN I: Sense of smell is normal.  CN II: Right normal visual field. Left normal visual field.  CN III, IV, VI: Extraocular movements intact bilaterally. Pupils equal round and reactive to light bilaterally.  CN V: Facial sensation is normal.  CN VII:  Right: There is no facial weakness.  Left: There is no facial weakness.  CN XI: Shoulder shrug strength is normal.  CN XII: Tongue midline without atrophy or fasciculations.    Motor  Normal muscle bulk throughout. Normal muscle tone. Strength is 5/5 throughout all four extremities.    Sensory  Sensation is intact to light touch, pinprick, vibration and proprioception in all four extremities.    Reflexes  Deep tendon reflexes are 2+ and symmetric in all four extremities.    Gait  Normal  casual, toe, heel and tandem gait.      Lab Results (last 24 hours)       Procedure Component Value Units Date/Time    Basic Metabolic Panel [[id number]]  (Abnormal) Collected: 09/26/24 0523    Specimen: Blood Updated: 09/26/24 0619     Glucose 102 mg/dL      BUN 13 mg/dL      Creatinine 0.98 mg/dL      Sodium 136 mmol/L      Potassium 4.2 mmol/L      Chloride 102 mmol/L      CO2 27.0 mmol/L      Calcium 8.3 mg/dL      BUN/Creatinine Ratio 13.3     Anion Gap 7.0 mmol/L      eGFR 88.8 mL/min/1.73     Narrative:      GFR Normal >60  Chronic Kidney Disease <60  Kidney Failure <15      CBC & Differential [[id number]]  (Abnormal) Collected: 09/26/24 0523    Specimen: Blood Updated: 09/26/24 0552    Narrative:      The following orders were created for panel order CBC & Differential.  Procedure                               Abnormality         Status                     ---------                               -----------         ------                     CBC Auto Differential[[id number]]        Abnormal            Final result                 Please view results for these tests on the individual orders.    CBC Auto Differential [[id number]]  (Abnormal) Collected: 09/26/24 0523    Specimen: Blood Updated: 09/26/24 0552     WBC 7.89 10*3/mm3      RBC 3.93 10*6/mm3      Hemoglobin 11.8 g/dL      Hematocrit 34.4 %      MCV 87.5 fL      MCH 30.0 pg      MCHC 34.3 g/dL      RDW 12.8 %      RDW-SD 41.1 fl      MPV 8.7 fL      Platelets 175 10*3/mm3      Neutrophil % 60.2 %      Lymphocyte % 24.5 %      Monocyte % 13.7 %      Eosinophil % 1.0 %      Basophil % 0.3 %      Immature Grans % 0.3 %      Neutrophils, Absolute 4.76 10*3/mm3      Lymphocytes, Absolute 1.93 10*3/mm3      Monocytes, Absolute 1.08 10*3/mm3      Eosinophils, Absolute 0.08 10*3/mm3      Basophils, Absolute 0.02 10*3/mm3      Immature Grans, Absolute 0.02 10*3/mm3      nRBC 0.0 /100 WBC     Tissue Pathology Exam [[id number]] Collected: 09/25/24 0920    Specimen:  Disc from Spine, Cervical Updated: 09/25/24 1322                Plan:   Patient is doing well.  Patient to work with physical and Occupational Therapy.  If patient has a good day will consider discharge later today      Cervical stenosis of spinal canal    Spinal stenosis in cervical region    Degeneration of cervical intervertebral disc        Yang Granados, APRN

## 2024-09-26 NOTE — PLAN OF CARE
Goal Outcome Evaluation:  Plan of Care Reviewed With: patient, spouse        Progress: improving  Outcome Evaluation: PT tx complete. Pt w/ minor c/o soreness tis morning. Bed mob Mod Ind to EOB. Reviewed spinal precautions, brace wear/cleaning, home safety, and mobility. Stood Ind. Amb 400' w/ SBA/Supervision. Went up/down 9 steps w/ SBA and use of handrail. Pt and spouse feel good about d/c.

## 2024-09-26 NOTE — THERAPY TREATMENT NOTE
Acute Care - Physical Therapy Treatment Note  [facility]     Patient Name: Lars Rand  : 1965  MRN: [id number]  Today's Date: 2024      Visit Dx:     ICD-10-CM ICD-9-CM   1. Impaired mobility [Z74.09]  Z74.09 799.89   2. Spinal stenosis in cervical region  M48.02 723.0   3. Degeneration of cervical intervertebral disc  M50.30 722.4   4. Degenerative disc disease, cervical  M50.30 722.4   5. Rheumatoid arthritis involving multiple sites with positive rheumatoid factor  M05.79 714.0     Patient Active Problem List   Diagnosis    Primary hypertension    Rheumatoid arthritis involving multiple sites with positive rheumatoid factor    Benign prostatic hyperplasia without lower urinary tract symptoms    Chronic neck pain    Mixed sleep apnea    Degenerative disc disease, cervical    Spinal stenosis in cervical region    Degeneration of cervical intervertebral disc    Cervical stenosis of spinal canal    Cervical spinal stenosis     Past Medical History:   Diagnosis Date    Arthritis     Erectile dysfunction 2023    GERD (gastroesophageal reflux disease)     Heart attack     Hypertension     Low back pain     Sleep apnea     Visual impairment     Rosie-Parkinson-White (WPW) pattern      Past Surgical History:   Procedure Laterality Date    ANTERIOR CERVICAL DISCECTOMY W/ FUSION N/A 2024    Procedure: CERVICAL DISCECTOMY ANTERIOR WITH FUSION C4-5,5-6,6-7;  Surgeon: Cj Daley MD;  Location: [facility];  Service: Neurosurgery;  Laterality: N/A;    TONSILECTOMY, ADENOIDECTOMY, BILATERAL MYRINGOTOMY AND TUBES       PT Assessment (Last 12 Hours)       PT Evaluation and Treatment       Row Name 24 1420 24 0940       Physical Therapy Time and Intention    Subjective Information no complaints  -TB no complaints  -TB    Document Type therapy note (daily note)  -TB therapy note (daily note)  -TB    Mode of Treatment physical therapy  -TB physical therapy  -TB    Comment Discharge  education  -TB --      Row Name 09/26/24 0940          General Information    Existing Precautions/Restrictions cervical collar;brace on at all times;fall  -TB       Row Name 09/26/24 0940          Bed Mobility    Bed Mobility scooting/bridging;supine-sit;sit-supine  -TB     Scooting/Bridging [place] (Bed Mobility) modified independence  -TB     Supine-Sit [place] (Bed Mobility) modified independence  -TB     Sit-Supine [place] (Bed Mobility) modified independence  -TB     Assistive Device (Bed Mobility) head of bed elevated  -TB       Row Name 09/26/24 0940          Transfers    Transfers sit-stand transfer;stand-sit transfer  -TB       Row Name 09/26/24 0940          Sit-Stand Transfer    Sit-Stand [place] (Transfers) independent  -TB       Row Name 09/26/24 0940          Stand-Sit Transfer    Stand-Sit [place] (Transfers) independent  -TB       Row Name 09/26/24 0940          Gait/Stairs (Locomotion)    [place] Level (Gait) supervision;standby assist  -TB     Distance in Feet (Gait) 400  -TB     [place] Level (Stairs) stand by assist  -TB     Handrail Location (Stairs) right side (ascending);right side (descending)  -TB     Number of Steps (Stairs) 9  -TB     Ascending Technique (Stairs) step-to-step  -TB     Descending Technique (Stairs) step-to-step  -TB     Comment, (Gait/Stairs) Cues for safety  -TB       Row Name 09/26/24 0940          Balance    Balance Assessment sitting static balance;sitting dynamic balance  -TB     Static Sitting Balance independent  -TB     Dynamic Sitting Balance independent  -TB     Position, Sitting Balance sitting edge of bed  -TB       Row Name             Wound 09/25/24 0755 anterior neck Incision    Wound - Properties Group Placement Date: 09/25/24  -ALIYAH Placement Time: 0755 -ALIYAH Orientation: anterior  -ALIYAH Location: neck  -ALIYAH Primary Wound Type: Incision  -ALIYAH    Retired Wound - Properties Group Placement Date: 09/25/24  -ALIYAH Placement Time: 0755   -ALIYAH Orientation: anterior  -ALIYAH Location: neck  -ALIYAH Primary Wound Type: Incision  -ALIYAH    Retired Wound - Properties Group Date first assessed: 09/25/24  -ALIYAH Time first assessed: 0755 -JB Location: neck  -ALIYAH Primary Wound Type: Incision  -ALIYAH      Row Name 09/26/24 0940          Plan of Care Review    Plan of Care Reviewed With patient;spouse  -TB     Progress improving  -TB     Outcome Evaluation PT tx complete. Pt w/ minor c/o soreness tis morning. Bed mob Mod Ind to EOB. Reviewed spinal precautions, brace wear/cleaning, home safety, and mobility. Stood Ind. Amb 400' w/ SBA/Supervision. Went up/down 9 steps w/ SBA and use of handrail. Pt and spouse feel good about d/c.  -TB       Row Name 09/26/24 1420 09/26/24 0940       Positioning and Restraints    Pre-Treatment Position in bed  -TB in bed  -TB    Post Treatment Position bed  -TB bed  -TB    In Bed fowlers;with family/caregiver;side rails up x2  -TB fowlers;call light within reach;encouraged to call for assist;with family/caregiver;side rails up x2  -TB              User Key  (r) = Recorded By, (t) = Taken By, (c) = Cosigned By      Initials Name Provider Type    Kobe Caldera, RN Registered Nurse    Ozzie Sawyer, PTA Physical Therapist Assistant                    Physical Therapy Education       Title: PT OT SLP Therapies (In Progress)       Topic: Physical Therapy (Done)       Point: Mobility training (Done)       Learning Progress Summary             Patient Acceptance, E, VU by DIGNA at 9/25/2024 1400    Comment: role of PT, spinal precautions, c collar wear, POC   Family Acceptance, E, VU by DIGNA at 9/25/2024 1400    Comment: role of PT, spinal precautions, c collar wear, POC                         Point: Home exercise program (Done)       Learning Progress Summary             Patient Acceptance, E, VU by DIGNA at 9/25/2024 1400    Comment: role of PT, spinal precautions, c collar wear, POC   Family Acceptance, E, VU by DIGNA at 9/25/2024 1400     Comment: role of PT, spinal precautions, c collar wear, POC                         Point: Body mechanics (Done)       Learning Progress Summary             Patient Acceptance, E, VU by  at 9/25/2024 1400    Comment: role of PT, spinal precautions, c collar wear, POC   Family Acceptance, E, VU by  at 9/25/2024 1400    Comment: role of PT, spinal precautions, c collar wear, POC                         Point: Precautions (Done)       Learning Progress Summary             Patient Acceptance, E, VU by  at 9/25/2024 1400    Comment: role of PT, spinal precautions, c collar wear, POC   Family Acceptance, E, VU by  at 9/25/2024 1400    Comment: role of PT, spinal precautions, c collar wear, POC                                         User Key       Initials Effective Dates Name Provider Type Discipline     08/15/24 -  Jj Murray, PT DPT Physical Therapist PT                  PT Recommendation and Plan     Plan of Care Reviewed With: patient, spouse  Progress: improving  Outcome Evaluation: PT tx complete. Pt w/ minor c/o soreness tis morning. Bed mob Mod Ind to EOB. Reviewed spinal precautions, brace wear/cleaning, home safety, and mobility. Stood Ind. Amb 400' w/ SBA/Supervision. Went up/down 9 steps w/ SBA and use of handrail. Pt and spouse feel good about d/c.       Time Calculation:    PT Charges       Row Name 09/26/24 1535 09/26/24 1142          Time Calculation    Start Time 1420  -TB 0940  -TB     Stop Time 1428  -TB 1008  -TB     Time Calculation (min) 8 min  -TB 28 min  -TB     PT Received On 09/26/24  -TB 09/26/24  -TB        Time Calculation- PT    Total Timed Code Minutes- PT 8 minute(s)  -TB 28 minute(s)  -TB               User Key  (r) = Recorded By, (t) = Taken By, (c) = Cosigned By      Initials Name Provider Type    TB Ozzie Keller, PTA Physical Therapist Assistant                  Therapy Charges for Today       Code Description Service Date Service Provider Modifiers Qty     [id number]  GAIT TRAINING EA 15 MIN 9/26/2024 Ozzie Keller, PTA GP 2    63437717975 HC PT THERAPEUTIC ACT EA 15 MIN 9/26/2024 Ozzie Keller, KATHYA GP 1            PT G-Codes  Outcome Measure Options: AM-PAC 6 Clicks Daily Activity (OT)  AM-PAC 6 Clicks Score (PT): 21  AM-PAC 6 Clicks Score (OT): 24    Ozzie Keller PTA  9/26/2024

## 2024-09-27 NOTE — THERAPY DISCHARGE NOTE
Acute Care - Physical Therapy Discharge Summary  [facility]       Patient Name: Lars Rand  : 1965  MRN: [id number]    Today's Date: 2024                 Admit Date: 2024      PT Recommendation and Plan    Visit Dx:    ICD-10-CM ICD-9-CM   1. Impaired mobility [Z74.09]  Z74.09 799.89   2. Spinal stenosis in cervical region  M48.02 723.0   3. Degeneration of cervical intervertebral disc  M50.30 722.4   4. Degenerative disc disease, cervical  M50.30 722.4   5. Rheumatoid arthritis involving multiple sites with positive rheumatoid factor  M05.79 714.0                PT Rehab Goals       Row Name 24 0900             Bed Mobility Goal 1 (PT)    Activity/Assistive Device (Bed Mobility Goal 1, PT) rolling to left;rolling to right;sit to supine;supine to sit  -AB      [place] Level/Cues Needed (Bed Mobility Goal 1, PT) independent  -AB      Time Frame (Bed Mobility Goal 1, PT) long term goal (LTG);10 days  -AB      Progress/Outcomes (Bed Mobility Goal 1, PT) goal partially met  -AB         Transfer Goal 1 (PT)    Activity/Assistive Device (Transfer Goal 1, PT) sit-to-stand/stand-to-sit;bed-to-chair/chair-to-bed;toilet;walk-in shower;car transfer  -AB      [place] Level/Cues Needed (Transfer Goal 1, PT) independent  -AB      Time Frame (Transfer Goal 1, PT) long term goal (LTG);10 days  -AB      Progress/Outcome (Transfer Goal 1, PT) goal met  -AB         Gait Training Goal 1 (PT)    Activity/Assistive Device (Gait Training Goal 1, PT) gait (walking locomotion);decrease asymmetrical patterns;decrease fall risk;diminish gait deviation;forward stepping;improve balance and speed;increase endurance/gait distance;increase energy conservation  -AB      [place] Level (Gait Training Goal 1, PT) standby assist  -AB      Distance (Gait Training Goal 1, PT) 250' with pain 5/10 or less  -AB      Time Frame (Gait Training Goal 1, PT) long term goal (LTG);10 days  -AB      Progress/Outcome  (Gait Training Goal 1, PT) goal met  -AB         Stairs Goal 1 (PT)    Activity/Assistive Device (Stairs Goal 1, PT) stairs, all skills;using handrail, left;using handrail, right;step-to-step;decrease fall risk;improve balance and speed  -AB      [place] Level/Cues Needed (Stairs Goal 1, PT) standby assist  -AB      Number of Stairs (Stairs Goal 1, PT) 4 as needed for home safety while maintain C collar wear and spinal precautions  -AB      Time Frame (Stairs Goal 1, PT) long term goal (LTG);10 days  -AB      Progress/Outcome (Stairs Goal 1, PT) goal met  -AB                User Key  (r) = Recorded By, (t) = Taken By, (c) = Cosigned By      Initials Name Provider Type Discipline    Jayla Adams, PTA Physical Therapist Assistant PT                        PT Discharge Summary  Anticipated Discharge Disposition (PT): home with assist  Reason for Discharge: Discharge from facility  Outcomes Achieved: Refer to plan of care for updates on goals achieved  Discharge Destination: Home with assist      Jayla Lee PTA   9/27/2024

## 2024-10-02 ENCOUNTER — TELEPHONE (OUTPATIENT)
Dept: INTERNAL MEDICINE | Facility: CLINIC | Age: 59
End: 2024-10-02

## 2024-10-02 ENCOUNTER — OFFICE VISIT (OUTPATIENT)
Dept: INTERNAL MEDICINE | Facility: CLINIC | Age: 59
End: 2024-10-02
Payer: MEDICAID

## 2024-10-02 VITALS
HEART RATE: 77 BPM | TEMPERATURE: 98.6 F | WEIGHT: 181 LBS | SYSTOLIC BLOOD PRESSURE: 138 MMHG | DIASTOLIC BLOOD PRESSURE: 82 MMHG | BODY MASS INDEX: 28.41 KG/M2 | HEIGHT: 67 IN | OXYGEN SATURATION: 98 %

## 2024-10-02 DIAGNOSIS — M50.30 DEGENERATIVE DISC DISEASE, CERVICAL: ICD-10-CM

## 2024-10-02 DIAGNOSIS — M50.30 DEGENERATION OF CERVICAL INTERVERTEBRAL DISC: Primary | ICD-10-CM

## 2024-10-02 DIAGNOSIS — M48.02 SPINAL STENOSIS IN CERVICAL REGION: ICD-10-CM

## 2024-10-02 DIAGNOSIS — M05.79 RHEUMATOID ARTHRITIS INVOLVING MULTIPLE SITES WITH POSITIVE RHEUMATOID FACTOR: ICD-10-CM

## 2024-10-02 DIAGNOSIS — M50.30 DEGENERATION OF CERVICAL INTERVERTEBRAL DISC: ICD-10-CM

## 2024-10-02 RX ORDER — OXYCODONE AND ACETAMINOPHEN 10; 325 MG/1; MG/1
1 TABLET ORAL EVERY 6 HOURS PRN
Qty: 120 TABLET | Refills: 0 | Status: SHIPPED | OUTPATIENT
Start: 2024-10-02 | End: 2024-10-02 | Stop reason: SDUPTHER

## 2024-10-02 RX ORDER — OXYCODONE AND ACETAMINOPHEN 10; 325 MG/1; MG/1
1 TABLET ORAL EVERY 6 HOURS PRN
Qty: 120 TABLET | Refills: 0 | Status: SHIPPED | OUTPATIENT
Start: 2024-10-02

## 2024-10-02 NOTE — TELEPHONE ENCOUNTER
Caller: FREDI HERNANDEZ    Relationship: Emergency Contact    Best call back number: [phone]     Requested Prescriptions:   Requested Prescriptions     Pending Prescriptions Disp Refills    oxyCODONE-acetaminophen (Percocet)  MG per tablet 120 tablet 0     Sig: Take 1 tablet by mouth Every 6 (Six) Hours As Needed for Moderate Pain.        Pharmacy where request should be sent: [facility] [address] [phone] [facility] [phone]      Last office visit with prescribing clinician: 10/2/2024   Last telemedicine visit with prescribing clinician: Visit date not found   Next office visit with prescribing clinician: 10/31/2024     Additional details provided by patient: PATIENT'S WIFE IS REQUESTING THAT THIS PRESCRIPTION IS TRANSFERRED TO [facility].     Does the patient have less than a 3 day supply:  [] Yes  [] No NEW DOSAGE     Would you like a call back once the refill request has been completed: [] Yes [] No    If the office needs to give you a call back, can they leave a voicemail: [] Yes [] No    David Miilan Rep   10/02/24 13:01 CDT

## 2024-10-02 NOTE — PROGRESS NOTES
"        Subjective     Chief Complaint   Patient presents with    Hospital Follow Up Visit     Neck surgery         History of Present Illness    Patient's PMR from outside medical facility reviewed and noted.    Lars Rand is a 59 y.o. male who presents for a hospital follow up.    Course During Hospital Stay:  \"Patient is a 59 y.o. male presented with Spinal stenosis in cervical region [M48.02], Degeneration of cervical intervertebral disc [M50.30], Cervical stenosis of spinal canal [M48.02],Cervical spinal stenosis [M48.02].  The patient has been through all manner of conservative care without any meaningful improvement. The patient went to the operating room on September 25, 2024 for a C4-5, C5-6, C6-7 ACDF.  The patient tolerated procedure well.  Currently the patient is ambulating.  The patient is tolerating p.o.  The patient is voiding spontaneously.  It is felt that at this time the patient is stable and suitable to be discharged home.  See orders for discharge instructions and restrictions.  The patient can take the dressing off in 72 hours and can get the wound wet at that time.  The patient is to clean the wound daily with soap and water.  They are to call the office with any drainage from the incision or worsening pain.  He is not to take any NSAIDs.  He is wear the cervical collar at all times.  He does have Percocet at home and can increase the dose to 1 Percocet every 6 hours as needed for pain.\"    Todays Visit: incision is clean dry and intact at this time no drainage noted.  Wants to increase his pain medicine temporarily post operatively.  Will be following with neurosurgery next week.    Pain described as dull and ache. does not radiate and occurs continuously. Patient also experiences the following associated symptoms patient denies any problems .    Current pain level is a 4/10 .  Patient states that durring the past week that his pain has interfered with his enjoyment of life 4/10  with " 10 bieng complete interference. The patient also states that during the past week that that the pain has interfered with his general activity 4/10  with 10 bieng complete interference. Patient reports that the pain improves with pain medication, and gets worse with  turning neck  .      The treatment plan and the above PEG score are reviewed with the patient.  Patient feels that he is doing well with the pain and improvement provided to him by the medication.  Patient states he can do more activities of daily living while taking the medication that it significantly impacts the quality of his life.  The patient reports no side effects of his medication, and have no evidence of oversedation, confusion, slurred speech, or abnormal gait at this time.  Patient reports he is compliant with his regimen and not modifying his own dosage and/or timing.  The patient reports that he is not taking any illicit substances, or alcohol.  Patient continues to do well with the current treatment plan and states that he would like to continue with opioid therapy at this time.  The patient has never had an overdose or needed a rescue medication we are aware of at this office.  A plan is in place discussed when initiating his narcotics for discontinuation should problems arise or pain improve.    Final Peg score: 4    Otherwise no other problems, complaints, or concerns.          Past Medical History:   Past Medical History:   Diagnosis Date    Arthritis     Erectile dysfunction 04/2023    GERD (gastroesophageal reflux disease)     Heart attack     Hypertension     Low back pain     Sleep apnea     Visual impairment 2022    Rosie-Parkinson-White (WPW) pattern      Past Surgical History:  Past Surgical History:   Procedure Laterality Date    ANTERIOR CERVICAL DISCECTOMY W/ FUSION N/A 9/25/2024    Procedure: CERVICAL DISCECTOMY ANTERIOR WITH FUSION C4-5,5-6,6-7;  Surgeon: Cj Daley MD;  Location: [facility];  Service: Neurosurgery;   Laterality: N/A;    TONSILECTOMY, ADENOIDECTOMY, BILATERAL MYRINGOTOMY AND TUBES       Social History:  reports that he quit smoking about 10 years ago. His smoking use included cigarettes. He started smoking about 47 years ago. He has a 37 pack-year smoking history. He has been exposed to tobacco smoke. He has never used smokeless tobacco. He reports current alcohol use of about 4.0 - 5.0 standard drinks of alcohol per week. He reports that he does not use drugs.    Family History: family history includes Arthritis in his father and mother; Dementia in his mother; Diabetes in his father and mother; Heart failure in his mother; Hypertension in his brother and father; Leukemia in his father; Stroke in his mother; Thyroid cancer in his sister.      Allergies:  No Known Allergies  Medications:  Prior to Admission medications    Medication Sig Start Date End Date Taking? Authorizing Provider   atenolol (TENORMIN) 25 MG tablet Take 1 tablet by mouth Daily. 8/14/24   Óscar Lehman MD   cyclobenzaprine (FLEXERIL) 10 MG tablet Take 1 tablet by mouth 3 (Three) Times a Day As Needed for Muscle Spasms. 9/26/24   Yang Granados APRN   folic acid (FOLVITE) 1 MG tablet Take 1 tablet by mouth Daily. 8/14/24   Óscar Lehman MD   hydroxychloroquine (PLAQUENIL) 200 MG tablet Take 2 tablets by mouth Daily. Indications: Rheumatoid Arthritis 8/14/24   Óscar Lehman MD   naloxone (NARCAN) 4 MG/0.1ML nasal spray Call 911. Don't prime. Ramsay in 1 nostril for overdose. Repeat in 2-3 minutes in other nostril if no or minimal breathing/responsiveness. 9/26/24   Yang Granados APRN   oxyCODONE-acetaminophen (Percocet) 7.5-325 MG per tablet Take 1 tablet by mouth Every 6 (Six) Hours As Needed for Moderate Pain. 9/26/24   Yang Granados APRN   sildenafil (VIAGRA) 100 MG tablet Take 1 tablet by mouth Daily As Needed for Erectile Dysfunction. 8/14/24   Óscar Lehman MD   traZODone  "(DESYREL) 50 MG tablet Take 1 tablet by mouth Every Night. 8/14/24   Óscar Lehman MD         Review of systems   negative unless otherwise specified above in HPI    Objective     Vital Signs: /82 (BP Location: Left arm, Patient Position: Sitting, Cuff Size: Adult)   Pulse 77   Temp 98.6 °F (37 °C) (Infrared)   Ht 170.2 cm (67\")   Wt 82.1 kg (181 lb)   SpO2 98%   BMI 28.35 kg/m²     Physical Exam  Vitals and nursing note reviewed.   Constitutional:       General: He is not in acute distress.     Appearance: Normal appearance.   HENT:      Head: Normocephalic.   Eyes:      Extraocular Movements: Extraocular movements intact.      Pupils: Pupils are equal, round, and reactive to light.   Cardiovascular:      Rate and Rhythm: Normal rate and regular rhythm.      Heart sounds: Normal heart sounds. No murmur heard.  Pulmonary:      Effort: Pulmonary effort is normal. No respiratory distress.      Breath sounds: Normal breath sounds. No rhonchi or rales.   Abdominal:      General: Abdomen is flat. Bowel sounds are normal.      Palpations: Abdomen is soft.   Neurological:      General: No focal deficit present.      Mental Status: He is alert.                Results Reviewed:  Glucose   Date Value Ref Range Status   09/26/2024 102 (H) 65 - 99 mg/dL Final     BUN   Date Value Ref Range Status   09/26/2024 13 6 - 20 mg/dL Final   06/17/2022 39 (H) 6 - 20 mg/dL Final     Creatinine   Date Value Ref Range Status   09/26/2024 0.98 0.76 - 1.27 mg/dL Final   06/17/2022 1.23 0.80 - 1.30 mg/dL Final     Sodium   Date Value Ref Range Status   09/26/2024 136 136 - 145 mmol/L Final   06/17/2022 134 (L) 136 - 144 mmol/L Final     Potassium   Date Value Ref Range Status   09/26/2024 4.2 3.5 - 5.2 mmol/L Final   06/17/2022 4.6 3.5 - 5.1 mmol/L Final     Chloride   Date Value Ref Range Status   09/26/2024 102 98 - 107 mmol/L Final   06/17/2022 105 100 - 110 mmol/L Final     CO2   Date Value Ref Range Status "   09/26/2024 27.0 22.0 - 29.0 mmol/L Final     Calcium   Date Value Ref Range Status   09/26/2024 8.3 (L) 8.6 - 10.5 mg/dL Final   06/17/2022 8.6 (L) 8.9 - 10.3 mg/dL Final     ALT (SGPT)   Date Value Ref Range Status   09/04/2024 16 0 - 44 IU/L Final   06/16/2022 11 <=41 U/L Final     AST (SGOT)   Date Value Ref Range Status   09/04/2024 17 0 - 40 IU/L Final   06/16/2022 13 <=40 U/L Final     WBC   Date Value Ref Range Status   09/26/2024 7.89 3.40 - 10.80 10*3/mm3 Final   09/04/2024 6.2 3.4 - 10.8 x10E3/uL Final     Hematocrit   Date Value Ref Range Status   09/26/2024 34.4 (L) 37.5 - 51.0 % Final     Platelets   Date Value Ref Range Status   09/26/2024 175 140 - 450 10*3/mm3 Final     Triglycerides   Date Value Ref Range Status   09/04/2024 292 (H) 0 - 149 mg/dL Final     HDL Cholesterol   Date Value Ref Range Status   09/04/2024 46 >39 mg/dL Final     LDL Chol Calc (NIH)   Date Value Ref Range Status   09/04/2024 70 0 - 99 mg/dL Final     Hemoglobin A1C   Date Value Ref Range Status   09/04/2024 5.5 4.8 - 5.6 % Final     Comment:              Prediabetes: 5.7 - 6.4           Diabetes: >6.4           Glycemic control for adults with diabetes: <7.0               Procedure   Procedures       Assessment / Plan     Assessment/Plan:   Diagnosis Plan   1. Degeneration of cervical intervertebral disc  oxyCODONE-acetaminophen (Percocet)  MG per tablet      2. Degenerative disc disease, cervical  oxyCODONE-acetaminophen (Percocet)  MG per tablet      3. Spinal stenosis in cervical region  oxyCODONE-acetaminophen (Percocet)  MG per tablet      4. Rheumatoid arthritis involving multiple sites with positive rheumatoid factor  oxyCODONE-acetaminophen (Percocet)  MG per tablet            No follow-ups on file. unless patient needs to be seen sooner or acute issues arise.      I have discussed the patient results/orders and and plan/recommendation with them at today's visit.      Signed by:    Óscar  Venkat Lehman MD Date: 10/02/24

## 2024-10-03 ENCOUNTER — PRIOR AUTHORIZATION (OUTPATIENT)
Dept: INTERNAL MEDICINE | Facility: CLINIC | Age: 59
End: 2024-10-03
Payer: MEDICAID

## 2024-10-03 LAB
CYTO UR: NORMAL
LAB AP CASE REPORT: NORMAL
Lab: NORMAL
PATH REPORT.FINAL DX SPEC: NORMAL
PATH REPORT.GROSS SPEC: NORMAL

## 2024-10-03 NOTE — TELEPHONE ENCOUNTER
Lars Rand (Key: [id number])  PA Case ID #: [id number]  Need Help? Call us at [phone]  Status  sent iconSent to Plan today  Drug  oxyCODONE-Acetaminophen 10-325MG tablets  ePA cloud logo  Form  [employer] [place] Medicaid ePA Form 2017 NCPDP

## 2024-10-04 ENCOUNTER — TELEPHONE (OUTPATIENT)
Dept: INTERNAL MEDICINE | Facility: CLINIC | Age: 59
End: 2024-10-04
Payer: MEDICAID

## 2024-10-04 NOTE — TELEPHONE ENCOUNTER
Provider: DRU RIVER    Caller: FREDI HERNANDEZ    Relationship to Patient: WIFE     Pharmacy: [facility] - [address] [phone] [facility] [phone]      Phone Number: [phone]     Reason for Call: PHARMACY TOLD PATIENT AND HIS WIFE THEY NEED APPROVAL FROM DR RIVER TO FILL MEDICATION. THEY TOLD THEM IT COULDN'T BE FILLED UNTIL 10/14/24, BUT THERE WAS AN INCREASE MADE SO PATIENT NEEDS TO  NOW

## 2024-10-07 NOTE — TELEPHONE ENCOUNTER
Pharmacy aware. Attempted to call pt wife. No answer. Left VM that medication is ready at pharmacy.

## 2024-10-17 ENCOUNTER — HOSPITAL ENCOUNTER (OUTPATIENT)
Dept: GENERAL RADIOLOGY | Facility: HOSPITAL | Age: 59
Discharge: HOME | End: 2024-10-17
Admitting: NURSE PRACTITIONER
Payer: MEDICAID

## 2024-10-17 ENCOUNTER — TELEPHONE (OUTPATIENT)
Dept: NEUROSURGERY | Facility: CLINIC | Age: 59
End: 2024-10-17
Payer: MEDICAID

## 2024-10-17 ENCOUNTER — OFFICE VISIT (OUTPATIENT)
Dept: NEUROSURGERY | Facility: CLINIC | Age: 59
End: 2024-10-17
Payer: MEDICAID

## 2024-10-17 VITALS — BODY MASS INDEX: 28.41 KG/M2 | HEIGHT: 67 IN | WEIGHT: 181 LBS

## 2024-10-17 DIAGNOSIS — M50.30 DEGENERATION OF CERVICAL INTERVERTEBRAL DISC: ICD-10-CM

## 2024-10-17 DIAGNOSIS — M48.02 SPINAL STENOSIS IN CERVICAL REGION: Primary | ICD-10-CM

## 2024-10-17 DIAGNOSIS — E66.3 OVERWEIGHT WITH BODY MASS INDEX (BMI) OF 28 TO 28.9 IN ADULT: ICD-10-CM

## 2024-10-17 DIAGNOSIS — M05.79 RHEUMATOID ARTHRITIS INVOLVING MULTIPLE SITES WITH POSITIVE RHEUMATOID FACTOR: ICD-10-CM

## 2024-10-17 DIAGNOSIS — F17.200 SMOKER: ICD-10-CM

## 2024-10-17 PROCEDURE — 99024 POSTOP FOLLOW-UP VISIT: CPT | Performed by: NURSE PRACTITIONER

## 2024-10-17 PROCEDURE — 72050 X-RAY EXAM NECK SPINE 4/5VWS: CPT

## 2024-10-17 RX ORDER — DICLOFENAC SODIUM 75 MG/1
1 TABLET, DELAYED RELEASE ORAL EVERY 12 HOURS SCHEDULED
COMMUNITY
Start: 2024-10-11

## 2024-10-17 NOTE — TELEPHONE ENCOUNTER
Pt called back and was given imaging results and informed pt he can come out of collar 10/30/24. Ended call with pt understanding and acknowledgment.

## 2024-10-17 NOTE — TELEPHONE ENCOUNTER
----- Message from Yang Granados sent at 10/17/2024 11:24 AM CDT -----  X-ray looks good he can come out of the brace on October 30  ----- Message -----  From: Interface, Rad SourceClear In  Sent: 10/17/2024  10:49 AM CDT  To: Yang Granados, HAYDER

## 2024-10-17 NOTE — PATIENT INSTRUCTIONS
"DASH Eating Plan  DASH stands for Dietary Approaches to Stop Hypertension. The DASH eating plan is a healthy eating plan that has been shown to:  Lower high blood pressure (hypertension).  Reduce your risk for type 2 diabetes, heart disease, and stroke.  Help with weight loss.  What are tips for following this plan?  Reading food labels  Check food labels for the amount of salt (sodium) per serving. Choose foods with less than 5 percent of the Daily Value (DV) of sodium. In general, foods with less than 300 milligrams (mg) of sodium per serving fit into this eating plan.  To find whole grains, look for the word \"whole\" as the first word in the ingredient list.  Shopping  Buy products labeled as \"low-sodium\" or \"no salt added.\"  Buy fresh foods. Avoid canned foods and pre-made or frozen meals.  Cooking  Try not to add salt when you cook. Use salt-free seasonings or herbs instead of table salt or sea salt. Check with your health care provider or pharmacist before using salt substitutes.  Do not vicente foods. Cook foods in healthy ways, such as baking, boiling, grilling, roasting, or broiling.  Cook using oils that are good for your heart. These include olive, canola, avocado, soybean, and sunflower oil.  Meal planning    Eat a balanced diet. This should include:  4 or more servings of fruits and 4 or more servings of vegetables each day. Try to fill half of your plate with fruits and vegetables.  6-8 servings of whole grains each day.  6 or less servings of lean meat, poultry, or fish each day. 1 oz is 1 serving. A 3 oz (85 g) serving of meat is about the same size as the palm of your hand. One egg is 1 oz (28 g).  2-3 servings of low-fat dairy each day. One serving is 1 cup (237 mL).  1 serving of nuts, seeds, or beans 5 times each week.  2-3 servings of heart-healthy fats. Healthy fats called omega-3 fatty acids are found in foods such as walnuts, flaxseeds, fortified milks, and eggs. These fats are also found in " cold-water fish, such as sardines, salmon, and mackerel.  Limit how much you eat of:  Canned or prepackaged foods.  Food that is high in trans fat, such as fried foods.  Food that is high in saturated fat, such as fatty meat.  Desserts and other sweets, sugary drinks, and other foods with added sugar.  Full-fat dairy products.  Do not salt foods before eating.  Do not eat more than 4 egg yolks a week.  Try to eat at least 2 vegetarian meals a week.  Eat more home-cooked food and less restaurant, buffet, and fast food.  Lifestyle  When eating at a restaurant, ask if your food can be made with less salt or no salt.  If you drink alcohol:  Limit how much you have to:  0-1 drink a day if you are female.  0-2 drinks a day if you are male.  Know how much alcohol is in your drink. In the [place] one drink is one 12 oz bottle of beer (355 mL), one 5 oz glass of wine (148 mL), or one 1½ oz glass of hard liquor (44 mL).  General information  Avoid eating more than 2,300 mg of salt a day. If you have hypertension, you may need to reduce your sodium intake to 1,500 mg a day.  Work with your provider to stay at a healthy body weight or lose weight. Ask what the best weight range is for you.  On most days of the week, get at least 30 minutes of exercise that causes your heart to beat faster. This may include walking, swimming, or biking.  Work with your provider or dietitian to adjust your eating plan to meet your specific calorie needs.  What foods should I eat?  Fruits  All fresh, dried, or frozen fruit. Canned fruits that are in their natural juice and do not have sugar added to them.  Vegetables  Fresh or frozen vegetables that are raw, steamed, roasted, or grilled. Low-sodium or reduced-sodium tomato and vegetable juice. Low-sodium or reduced-sodium tomato sauce and tomato paste. Low-sodium or reduced-sodium canned vegetables.  Grains  Whole-grain or whole-wheat bread. Whole-grain or whole-wheat pasta. Brown rice. Oatmeal.  Quinoa. Bulgur. Whole-grain and low-sodium cereals. Ciara bread. Low-fat, low-sodium crackers. Whole-wheat flour tortillas.  Meats and other proteins  Skinless chicken or turkey. Ground chicken or turkey. Pork with fat trimmed off. Fish and seafood. Egg whites. Dried beans, peas, or lentils. Unsalted nuts, nut butters, and seeds. Unsalted canned beans. Lean cuts of beef with fat trimmed off. Low-sodium, lean precooked or cured meat, such as sausages or meat loaves.  Dairy  Low-fat (1%) or fat-free (skim) milk. Reduced-fat, low-fat, or fat-free cheeses. Nonfat, low-sodium ricotta or cottage cheese. Low-fat or nonfat yogurt. Low-fat, low-sodium cheese.  Fats and oils  Soft margarine without trans fats. Vegetable oil. Reduced-fat, low-fat, or light mayonnaise and salad dressings (reduced-sodium). Canola, safflower, olive, avocado, soybean, and sunflower oils. Avocado.  Seasonings and condiments  Herbs. Spices. Seasoning mixes without salt.  Other foods  Unsalted popcorn and pretzels. Fat-free sweets.  The items listed above may not be all the foods and drinks you can have. Talk to a dietitian to learn more.  What foods should I avoid?  Fruits  Canned fruit in a light or heavy syrup. Fried fruit. Fruit in cream or butter sauce.  Vegetables  Creamed or fried vegetables. Vegetables in a cheese sauce. Regular canned vegetables that are not marked as low-sodium or reduced-sodium. Regular canned tomato sauce and paste that are not marked as low-sodium or reduced-sodium. Regular tomato and vegetable juices that are not marked as low-sodium or reduced-sodium. Pickles. Olives.  Grains  Baked goods made with fat, such as croissants, muffins, or some breads. Dry pasta or rice meal packs.  Meats and other proteins  Fatty cuts of meat. Ribs. Fried meat. Rodriguez. Bologna, salami, and other precooked or cured meats, such as sausages or meat loaves, that are not lean and low in sodium. Fat from the back of a pig (fatback). Raquel.  Salted nuts and seeds. Canned beans with added salt. Canned or smoked fish. Whole eggs or egg yolks. Chicken or turkey with skin.  Dairy  Whole or 2% milk, cream, and half-and-half. Whole or full-fat cream cheese. Whole-fat or sweetened yogurt. Full-fat cheese. Nondairy creamers. Whipped toppings. Processed cheese and cheese spreads.  Fats and oils  Butter. Stick margarine. Lard. Shortening. Ghee. Rodriguez fat. Tropical oils, such as coconut, palm kernel, or palm oil.  Seasonings and condiments  Onion salt, garlic salt, seasoned salt, table salt, and sea salt. Worcestershire sauce. Tartar sauce. Barbecue sauce. Teriyaki sauce. Soy sauce, including reduced-sodium soy sauce. Steak sauce. Canned and packaged gravies. Fish sauce. Oyster sauce. Cocktail sauce. Store-bought horseradish. Ketchup. Mustard. Meat flavorings and tenderizers. Bouillon cubes. Hot sauces. Pre-made or packaged marinades. Pre-made or packaged taco seasonings. Relishes. Regular salad dressings.  Other foods  Salted popcorn and pretzels.  The items listed above may not be all the foods and drinks you should avoid. Talk to a dietitian to learn more.  Where to find more information  National Heart, Lung, and Blood [place] (NHLBI): nhlbi.nih.gov  American Heart Association (AHA): heart.org  Academy of Nutrition and Dietetics: eatright.org  National Kidney Foundation (NKF): kidney.org  This information is not intended to replace advice given to you by your health care provider. Make sure you discuss any questions you have with your health care provider.  Document Revised: 01/04/2024 Document Reviewed: 01/04/2024  Elsevier Patient Education © 2024 [employer]  BMI for Adults  Body mass index (BMI) is a number found using a person's weight and height. BMI can help tell how much of a person's weight is made up of fat. BMI does not measure body fat directly. It is used instead of tests that directly measure body fat, which can be difficult and  "expensive.  What are BMI measurements used for?  BMI is useful to:  Find out if your weight puts you at higher risk for medical problems.  Help recommend changes, such as in diet and exercise. This can help you reach a healthy weight. BMI screening can be done again to see if these changes are working.  How is BMI calculated?  Your height and weight are measured. The BMI is found from those numbers. This can be done with U[place] or metric measurements. Note that charts and online BMI calculators are available to help you find your BMI quickly and easily without doing these calculations.  To calculate your BMI in U[place] measurements:  Measure your weight in pounds (lb).  Multiply the number of pounds by 703.  So, for an adult who weighs 150 lb, multiply that number by 703: 150 x 703, which equals 105,450.  Measure your height in inches. Then multiply that number by itself to get a measurement called \"inches squared.\"  So, for an adult who is 70 inches tall, the \"inches squared\" measurement is 70 inches x 70 inches, which equals 4,900 inches squared.  Divide the total from step 2 (number of lb x 703) by the total from step 3 (inches squared): 105,450 ÷ 4,900 = 21.5. This is your BMI.  To calculate your BMI in metric measurements:    Measure your weight in kilograms (kg).  For this example, the weight is 70 kg.  Measure your height in meters (m). Then multiply that number by itself to get a measurement called \"meters squared.\"  So, for an adult who is 1.75 m tall, the \"meters squared\" measurement is 1.75 m x 1.75 m, which equals 3.1 meters squared.  Divide the number of kilograms (your weight) by the meters squared number. In this example: 70 ÷ 3.1 = 22.6. This is your BMI.  What do the results mean?  BMI charts are used to see if you are underweight, normal weight, overweight, or obese. The following guidelines will be used:  Underweight: BMI less than 18.5.  Normal weight: BMI between 18.5 and 24.9.  Overweight: BMI " between 25 and 29.9.  Obese: BMI of 30 or above.  BMI is a tool and cannot diagnose a condition. Talk with your health care provider about what your BMI means for you. Keep these notes in mind:  Weight includes fat and muscle. Someone with a muscular build, such as an athlete, may have a BMI that is higher than 24.9. In cases like these, BMI is not a correct measure of body fat.  If you have a BMI of 25 or higher, your provider may need to do more testing to find out if excess body fat is the cause.  BMI is measured the same way for males and females. Females usually have more body fat than males of the same height and weight.  Where to find more information  For more information about BMI, including tools to quickly find your BMI, go to:  Centers for Disease Control and Prevention: cdc.gov  American Heart Association: heart.org  National Heart, Lung, and Blood [place]: nhlbi.nih.gov  This information is not intended to replace advice given to you by your health care provider. Make sure you discuss any questions you have with your health care provider.  Document Revised: 09/07/2023 Document Reviewed: 08/31/2023  Elsevier Patient Education © 2024 [employer] Inc.

## 2024-10-17 NOTE — PROGRESS NOTES
"  Chief complaint:   Chief Complaint   Patient presents with    Neck Pain     Pt is here for 3wk p/o f/u. Pt states since surgery his symptoms have improved.         Subjective     HPI: This is a 59 y.o. male patient who went to the operating room on 9/25/2024 for a Cervical Discectomy Anterior With Fusion C4-5,5-6,6-7. The patient is here in follow up today for postoperative visit.  Prior to started the patient was complaining of neck pain and bilateral upper extremity pain with the left being worse than the right.  He was also complaining of hand weakness.  He comes in today and says that he does feel he is doing well.  He is not complaining of the neck pain like he had before surgery.  He says his arm pain has improved.  He does still have some left upper extremity pain.  The numbness has improved.  He also feels like the weakness has improved.  He is wearing his collar today.  Overall he feels like he is 80 to 90% improved than what he was from before the surgery        Review of Systems   Musculoskeletal:  Positive for myalgias and neck pain.   Neurological:  Positive for numbness.         Objective      Vital Signs  Ht 170.2 cm (67\")   Wt 82.1 kg (181 lb)   BMI 28.35 kg/m²     Physical Exam  Constitutional:       General: He is awake.      Appearance: He is well-developed.   HENT:      Head: Normocephalic.   Eyes:      Extraocular Movements: Extraocular movements intact.      Pupils: Pupils are equal, round, and reactive to light.   Pulmonary:      Effort: Pulmonary effort is normal.   Musculoskeletal:         General: Normal range of motion.      Cervical back: Normal range of motion.   Skin:     General: Skin is warm.   Neurological:      Mental Status: He is alert and oriented to person, place, and time.      GCS: GCS eye subscore is 4. GCS verbal subscore is 5. GCS motor subscore is 6.      Cranial Nerves: No cranial nerve deficit.      Sensory: No sensory deficit.      Motor: Motor strength is normal.   "   Gait: Gait normal.      Deep Tendon Reflexes: Reflexes are normal and symmetric.   Psychiatric:         Speech: Speech normal.         Behavior: Behavior normal.         Thought Content: Thought content normal.       Incisions clean dry and intact    Neurological Exam  Mental Status  Awake and alert. Oriented to person, place and time. Oriented to person, place, and time. Speech is normal. Language is fluent with no aphasia. Attention and concentration are normal.    Cranial Nerves  CN I: Sense of smell is normal.  CN II: Right normal visual field. Left normal visual field.  CN III, IV, VI: Extraocular movements intact bilaterally. Pupils equal round and reactive to light bilaterally.  CN V: Facial sensation is normal.  CN VII:  Right: There is no facial weakness.  Left: There is no facial weakness.  CN XI: Shoulder shrug strength is normal.  CN XII: Tongue midline without atrophy or fasciculations.    Motor  Normal muscle bulk throughout. Normal muscle tone. Strength is 5/5 throughout all four extremities.    Sensory  Sensation is intact to light touch, pinprick, vibration and proprioception in all four extremities.    Reflexes  Deep tendon reflexes are 2+ and symmetric in all four extremities.    Gait  Normal casual, toe, heel and tandem gait. Normal gait.      Imaging review: No new imaging          Assessment/Plan: Patient is doing well.  We will have him go for x-rays of the cervical spine today.  We did go over activity restrictions after he does come out of the collar.  We will have him follow-up with Dr. Daley in December for routine postop.  He was told to call us if any further problems or concerns    Patient is a smoker. Smoking cessation classes given to the patient  The patient's Body mass index is 28.35 kg/m².. BMI is above normal parameters. Recommendations include: educational material and nutrition counseling    Diagnoses and all orders for this visit:    1. Spinal stenosis in cervical region  (Primary)  -     XR Spine Cervical Complete 4 or 5 View    2. Degeneration of cervical intervertebral disc  -     XR Spine Cervical Complete 4 or 5 View    3. Rheumatoid arthritis involving multiple sites with positive rheumatoid factor    4. Overweight with body mass index (BMI) of 28 to 28.9 in adult    5. Smoker        I discussed the patients findings and my recommendations with patient  Yang Granados, APRN  10/17/24  10:01 T

## 2024-10-31 ENCOUNTER — OFFICE VISIT (OUTPATIENT)
Dept: INTERNAL MEDICINE | Facility: CLINIC | Age: 59
End: 2024-10-31
Payer: MEDICAID

## 2024-10-31 VITALS
HEART RATE: 70 BPM | WEIGHT: 185 LBS | HEIGHT: 67 IN | SYSTOLIC BLOOD PRESSURE: 136 MMHG | TEMPERATURE: 98.6 F | BODY MASS INDEX: 29.03 KG/M2 | OXYGEN SATURATION: 96 % | DIASTOLIC BLOOD PRESSURE: 81 MMHG

## 2024-10-31 DIAGNOSIS — M50.30 DEGENERATIVE DISC DISEASE, CERVICAL: ICD-10-CM

## 2024-10-31 DIAGNOSIS — M48.02 SPINAL STENOSIS IN CERVICAL REGION: ICD-10-CM

## 2024-10-31 DIAGNOSIS — M05.79 RHEUMATOID ARTHRITIS INVOLVING MULTIPLE SITES WITH POSITIVE RHEUMATOID FACTOR: ICD-10-CM

## 2024-10-31 DIAGNOSIS — M50.30 DEGENERATION OF CERVICAL INTERVERTEBRAL DISC: ICD-10-CM

## 2024-10-31 DIAGNOSIS — Z00.00 ANNUAL PHYSICAL EXAM: Primary | ICD-10-CM

## 2024-10-31 PROCEDURE — 99396 PREV VISIT EST AGE 40-64: CPT | Performed by: FAMILY MEDICINE

## 2024-10-31 PROCEDURE — 1125F AMNT PAIN NOTED PAIN PRSNT: CPT | Performed by: FAMILY MEDICINE

## 2024-10-31 PROCEDURE — 1159F MED LIST DOCD IN RCRD: CPT | Performed by: FAMILY MEDICINE

## 2024-10-31 PROCEDURE — 3079F DIAST BP 80-89 MM HG: CPT | Performed by: FAMILY MEDICINE

## 2024-10-31 PROCEDURE — 1160F RVW MEDS BY RX/DR IN RCRD: CPT | Performed by: FAMILY MEDICINE

## 2024-10-31 PROCEDURE — 3075F SYST BP GE 130 - 139MM HG: CPT | Performed by: FAMILY MEDICINE

## 2024-10-31 RX ORDER — OXYCODONE AND ACETAMINOPHEN 10; 325 MG/1; MG/1
1 TABLET ORAL EVERY 8 HOURS PRN
Qty: 90 TABLET | Refills: 0 | Status: SHIPPED | OUTPATIENT
Start: 2024-10-31

## 2024-10-31 NOTE — PROGRESS NOTES
Subjective     Chief Complaint   Patient presents with    Annual Exam       History of Present Illness    Patient's PMR from outside medical facility reviewed and noted.    Lars Rand is a 59 y.o. male who presents for a routine office visit and presents for an annual physical.  I discussed at preventative health care and cancer screening with him and its role in reducing types of cancer and other health problems appropriate for the patient's age.  Also discussed vaccines and current status with the patient.  Patient understands the risks and benefits of screening and is currently up-to-date with current recommendations that he chosses.    Certainly completed all of this patient's wellness labs.    I discussed at length CT Lung cancer screening and other Lung cancer screening methods with him and its role in reducing lung cancer.  Patient understands the risks and benefits of screening and has decided that at this time he does not wish to do any screening for lung cancer.  Patient also likewise did not want to do aany other lung ca screening test.  I advised him that I will bring this up in the future to see if he has changed their mind on lung cancer screening     He also Comes in secondary to chronic neck pain dates back to an accident which occurred in 2022 prior to that patient had also had a longstanding history of rheumatoid arthritis with multi joint painand had been following with Dr. Quijano.   his pain is stable, well controlled, and improved after surgery.    I have discussed with him the possibility of addiction, overdose, as well as the limited benefits and other risks of opioid use.  Patient reports a greater than 30% continuous improvement in functional capability on the 3 item PEG assessment scale since his initial assessment, his Edvin has been reviewed, and risk for diversion or abuse appears low.  The patient states that he has no side effects of his medication, does not appear  oversedated, states that he is not modifying his own regimen, and that he is not taking any other illicit substances.  The patient has never had an overdose or needed a rescue medication we are aware of at this office.  A plan is in place discussed when initiating his narcotics for discontinuation should problems arise or pain improve.    Pain characteristics:  Current pain level : 4/10 as reported by patient.    Pain Description :  dull, ache, sharp, shooting, and stabbing. does radiate and occurs several times per day and when active.   Alleviating factors :   pain medication  Aggravating factors :  turning neck  Associated Symptoms:  patient denies any problems        PEG scale:  1. Pain on average over the Last week 4/10  2. Patient states that durring the past week that thier pain has interfered with his enjoyment of life 4/10  with 10 bieng complete interference.  3. The patient also states that during the past week that that his pain has interfered with his general activity 4/10  with 10 bieng complete interference      Final Peg Score: 45    No other problems, complaints. or concerns noted.      Current MME total: 45    Objective Radiological Findings:  Degenerative changes, most advanced at C5-6 and C6-7. On cervical spine xray, Possible partial fusion of the inferior sacroiliac joints on lumbar xray, positive RF     Trearment goals: reduced pain, Increased activities of daily living     History:              Duration of the patient's pain: since before 2010              Legal issues? NO              Current controlled medication and doses: percocet              Medications that have failed: nsaids, norco              Side effects of current medications: yes              Satisfaction with treatment: yes     Functional Status:              Able to feed self: YES              Able to bathe self: YES              Able to use the toilet independently: YES              Able to dress self: YES              Able  to get up from bed or chair without assistance: yes     Adverse Events:              Constipation: no              Lethargy: NO                Aberrant Behavior:              Over dose: NO              Run out of medications early: NO              Last UDS consistent: yes              Taking medications as prescribed: YES     Past Medical History:   Past Medical History:   Diagnosis Date    Arthritis     Erectile dysfunction 04/2023    GERD (gastroesophageal reflux disease)     Heart attack     Hypertension     Low back pain     Sleep apnea     Visual impairment 2022    Rosie-Parkinson-White (WPW) pattern      Past Surgical History:  Past Surgical History:   Procedure Laterality Date    ANTERIOR CERVICAL DISCECTOMY W/ FUSION N/A 09/25/2024    Procedure: CERVICAL DISCECTOMY ANTERIOR WITH FUSION C4-5,5-6,6-7;  Surgeon: Cj Daley MD;  Location: [facility];  Service: Neurosurgery;  Laterality: N/A;    SPINE SURGERY  9/25/2024    Cervical spine    TONSILECTOMY, ADENOIDECTOMY, BILATERAL MYRINGOTOMY AND TUBES      TONSILLECTOMY  1971     Social History:  reports that he quit smoking about 10 years ago. His smoking use included cigarettes. He started smoking about 47 years ago. He has a 37 pack-year smoking history. He has been exposed to tobacco smoke. He has never used smokeless tobacco. He reports current alcohol use of about 4.0 - 5.0 standard drinks of alcohol per week. He reports that he does not use drugs.    Family History: family history includes Arthritis in his father and mother; Dementia in his mother; Diabetes in his father and mother; Heart failure in his mother; Hypertension in his brother and father; Leukemia in his father; Stroke in his mother; Thyroid cancer in his sister.      Allergies:  No Known Allergies  Medications:  Prior to Admission medications    Medication Sig Start Date End Date Taking? Authorizing Provider   atenolol (TENORMIN) 25 MG tablet Take 1 tablet by mouth Daily. 8/14/24  Yes  Óscar Lehman MD   cyclobenzaprine (FLEXERIL) 10 MG tablet Take 1 tablet by mouth 3 (Three) Times a Day As Needed for Muscle Spasms. 9/26/24  Yes Yang Granados APRN   folic acid (FOLVITE) 1 MG tablet Take 1 tablet by mouth Daily. 8/14/24  Yes Óscar Lehman MD   hydroxychloroquine (PLAQUENIL) 200 MG tablet Take 2 tablets by mouth Daily. Indications: Rheumatoid Arthritis 8/14/24  Yes Óscar Lheman MD   naloxone (NARCAN) 4 MG/0.1ML nasal spray Call 911. Don't prime. [place] in 1 nostril for overdose. Repeat in 2-3 minutes in other nostril if no or minimal breathing/responsiveness. 9/26/24  Yes Yang Granados APRN   oxyCODONE-acetaminophen (Percocet)  MG per tablet Take 1 tablet by mouth Every 6 (Six) Hours As Needed for Moderate Pain. 10/2/24  Yes Óscar Lehman MD   sildenafil (VIAGRA) 100 MG tablet Take 1 tablet by mouth Daily As Needed for Erectile Dysfunction. 8/14/24  Yes Óscar Lehman MD   diclofenac (VOLTAREN) 75 MG EC tablet Take 1 tablet by mouth Every 12 (Twelve) Hours.  Patient not taking: Reported on 10/31/2024 10/11/24   ProviderLindsay MD   oxyCODONE-acetaminophen (Percocet) 7.5-325 MG per tablet Take 1 tablet by mouth Every 6 (Six) Hours As Needed for Moderate Pain.  Patient not taking: Reported on 10/31/2024 9/26/24   Yang Granados APRN   traZODone (DESYREL) 50 MG tablet Take 1 tablet by mouth Every Night.  Patient not taking: Reported on 10/31/2024 8/14/24   Óscar Lehman MD       NILDA:        PHQ-9 Depression Screening  Little interest or pleasure in doing things? Not at all   Feeling down, depressed, or hopeless? Not at all   PHQ-2 Total Score 0   Trouble falling or staying asleep, or sleeping too much?     Feeling tired or having little energy?     Poor appetite or overeating?     Feeling bad about yourself - or that you are a failure or have let yourself or your family down?     Trouble concentrating on  "things, such as reading the newspaper or watching television?     Moving or speaking so slowly that other people could have noticed? Or the opposite - being so fidgety or restless that you have been moving around a lot more than usual?     Thoughts that you would be better off dead, or of hurting yourself in some way?     PHQ-9 Total Score     If you checked off any problems, how difficult have these problems made it for you to do your work, take care of things at home, or get along with other people?         PHQ-2 Total Score: 0   0 (Negative screening for depression)  Support given, observe for worsening symptoms    Review of systems   negative unless otherwise specified above in HPI    Objective     Vital Signs: /81 (BP Location: Left arm, Patient Position: Sitting, Cuff Size: Large Adult)   Pulse 70   Temp 98.6 °F (37 °C) (Infrared)   Ht 170.2 cm (67\")   Wt 83.9 kg (185 lb)   SpO2 96%   BMI 28.98 kg/m²     Physical Exam  Vitals and nursing note reviewed.   Constitutional:       General: He is not in acute distress.     Appearance: Normal appearance.   HENT:      Head: Normocephalic.   Eyes:      Extraocular Movements: Extraocular movements intact.      Pupils: Pupils are equal, round, and reactive to light.   Cardiovascular:      Rate and Rhythm: Normal rate and regular rhythm.      Heart sounds: Normal heart sounds. No murmur heard.  Pulmonary:      Effort: Pulmonary effort is normal. No respiratory distress.      Breath sounds: Normal breath sounds. No rhonchi or rales.   Abdominal:      General: Abdomen is flat. Bowel sounds are normal.      Palpations: Abdomen is soft.   Neurological:      General: No focal deficit present.      Mental Status: He is alert.                Results Reviewed:  Glucose   Date Value Ref Range Status   09/26/2024 102 (H) 65 - 99 mg/dL Final     BUN   Date Value Ref Range Status   09/26/2024 13 6 - 20 mg/dL Final   06/17/2022 39 (H) 6 - 20 mg/dL Final     Creatinine "   Date Value Ref Range Status   09/26/2024 0.98 0.76 - 1.27 mg/dL Final   06/17/2022 1.23 0.80 - 1.30 mg/dL Final     Sodium   Date Value Ref Range Status   09/26/2024 136 136 - 145 mmol/L Final   06/17/2022 134 (L) 136 - 144 mmol/L Final     Potassium   Date Value Ref Range Status   09/26/2024 4.2 3.5 - 5.2 mmol/L Final   06/17/2022 4.6 3.5 - 5.1 mmol/L Final     Chloride   Date Value Ref Range Status   09/26/2024 102 98 - 107 mmol/L Final   06/17/2022 105 100 - 110 mmol/L Final     CO2   Date Value Ref Range Status   09/26/2024 27.0 22.0 - 29.0 mmol/L Final     Calcium   Date Value Ref Range Status   09/26/2024 8.3 (L) 8.6 - 10.5 mg/dL Final   06/17/2022 8.6 (L) 8.9 - 10.3 mg/dL Final     ALT (SGPT)   Date Value Ref Range Status   09/04/2024 16 0 - 44 IU/L Final   06/16/2022 11 <=41 U/L Final     AST (SGOT)   Date Value Ref Range Status   09/04/2024 17 0 - 40 IU/L Final   06/16/2022 13 <=40 U/L Final     WBC   Date Value Ref Range Status   09/26/2024 7.89 3.40 - 10.80 10*3/mm3 Final   09/04/2024 6.2 3.4 - 10.8 x10E3/uL Final     Hematocrit   Date Value Ref Range Status   09/26/2024 34.4 (L) 37.5 - 51.0 % Final     Platelets   Date Value Ref Range Status   09/26/2024 175 140 - 450 10*3/mm3 Final     Triglycerides   Date Value Ref Range Status   09/04/2024 292 (H) 0 - 149 mg/dL Final     HDL Cholesterol   Date Value Ref Range Status   09/04/2024 46 >39 mg/dL Final     LDL Chol Calc (NIH)   Date Value Ref Range Status   09/04/2024 70 0 - 99 mg/dL Final     Hemoglobin A1C   Date Value Ref Range Status   09/04/2024 5.5 4.8 - 5.6 % Final     Comment:              Prediabetes: 5.7 - 6.4           Diabetes: >6.4           Glycemic control for adults with diabetes: <7.0               Procedure   Procedures       Assessment / Plan     Assessment/Plan:   Diagnosis Plan   1. Annual physical exam        2. Degeneration of cervical intervertebral disc  oxyCODONE-acetaminophen (Percocet)  MG per tablet      3. Degenerative  disc disease, cervical  oxyCODONE-acetaminophen (Percocet)  MG per tablet      4. Spinal stenosis in cervical region  oxyCODONE-acetaminophen (Percocet)  MG per tablet      5. Rheumatoid arthritis involving multiple sites with positive rheumatoid factor  oxyCODONE-acetaminophen (Percocet)  MG per tablet            Return in about 4 weeks (around 11/28/2024) for Recheck. unless patient needs to be seen sooner or acute issues arise.      I have discussed the patient results/orders and and plan/recommendation with them at today's visit.      Signed by:    Óscar Lehman MD Date: 10/31/24

## 2024-11-21 ENCOUNTER — OFFICE VISIT (OUTPATIENT)
Dept: INTERNAL MEDICINE | Facility: CLINIC | Age: 59
End: 2024-11-21
Payer: MEDICAID

## 2024-11-21 VITALS
SYSTOLIC BLOOD PRESSURE: 145 MMHG | WEIGHT: 180 LBS | TEMPERATURE: 98 F | BODY MASS INDEX: 28.25 KG/M2 | HEART RATE: 62 BPM | DIASTOLIC BLOOD PRESSURE: 90 MMHG | OXYGEN SATURATION: 98 % | HEIGHT: 67 IN

## 2024-11-21 DIAGNOSIS — I10 PRIMARY HYPERTENSION: Primary | ICD-10-CM

## 2024-11-21 DIAGNOSIS — M50.30 DEGENERATIVE DISC DISEASE, CERVICAL: ICD-10-CM

## 2024-11-21 DIAGNOSIS — M05.79 RHEUMATOID ARTHRITIS INVOLVING MULTIPLE SITES WITH POSITIVE RHEUMATOID FACTOR: ICD-10-CM

## 2024-11-21 DIAGNOSIS — M50.30 DEGENERATION OF CERVICAL INTERVERTEBRAL DISC: ICD-10-CM

## 2024-11-21 DIAGNOSIS — M48.02 SPINAL STENOSIS IN CERVICAL REGION: ICD-10-CM

## 2024-11-21 RX ORDER — OXYCODONE AND ACETAMINOPHEN 10; 325 MG/1; MG/1
1 TABLET ORAL EVERY 8 HOURS PRN
Qty: 90 TABLET | Refills: 0 | Status: SHIPPED | OUTPATIENT
Start: 2024-11-21

## 2024-11-21 NOTE — PROGRESS NOTES
Subjective     Chief Complaint   Patient presents with    Arthritis    Neck Pain       History of Present Illness    Patient's PMR from outside medical facility reviewed and noted.    Lars Rand is a 59 y.o. male who presents for their chronic complaint.  He comes in secondary to chronic neck pain dates back to an accident which occurred in 2022 prior to that patient had also had a longstanding history of rheumatoid arthritis with multi joint painand had been following with Dr. Quijano.   his pain is stable, well controlled, and improved after surgery.    I have discussed with him the possibility of addiction, overdose, as well as the limited benefits and other risks of opioid use.  Patient reports a greater than 30% continuous improvement in functional capability on the 3 item PEG assessment scale since his initial assessment, his Edvin has been reviewed, and risk for diversion or abuse appears low.  The patient states that he has no side effects of his medication, does not appear oversedated, states that he is not modifying his own regimen, and that he is not taking any other illicit substances.  The patient has never had an overdose or needed a rescue medication we are aware of at this office.  A plan is in place discussed when initiating his narcotics for discontinuation should problems arise or pain improve.    Pain characteristics:  Current pain level : 4/10 as reported by patient.    Pain Description :  dull, ache, sharp, and stabbing. does radiate and occurs continuously.   Alleviating factors :   pain medication  Aggravating factors : standing and walking  Associated Symptoms:  patient denies any problems        PEG scale:  1. Pain on average over the Last week 4/10  2. Patient states that durring the past week that thier pain has interfered with his enjoyment of life 4/10  with 10 bieng complete interference.  3. The patient also states that during the past week that that his pain has  interfered with his general activity 4/10  with 10 bieng complete interference      Final Peg Score: 4    Blood pressure was elevated today but did come back with subsequent testing    No other problems, complaints. or concerns noted.      Current MME total: 45    Objective Radiological Findings:  Degenerative changes, most advanced at C5-6 and C6-7. On cervical spine xray, Possible partial fusion of the inferior sacroiliac joints on lumbar xray, positive RF     Trearment goals: reduced pain, Increased activities of daily living     History:              Duration of the patient's pain: since before 2010              Legal issues? NO              Current controlled medication and doses: percocet              Medications that have failed: nsaids, norco              Side effects of current medications: yes              Satisfaction with treatment: yes     Functional Status:              Able to feed self: YES              Able to bathe self: YES              Able to use the toilet independently: YES              Able to dress self: YES              Able to get up from bed or chair without assistance: yes     Adverse Events:              Constipation: no              Lethargy: NO                Aberrant Behavior:              Over dose: NO              Run out of medications early: NO              Last UDS consistent: yes              Taking medications as prescribed: YES     Past Medical History:   Past Medical History:   Diagnosis Date    Arthritis     Erectile dysfunction 04/2023    GERD (gastroesophageal reflux disease)     Heart attack     Hypertension     Low back pain     Sleep apnea     Visual impairment 2022    Rosie-Parkinson-White (WPW) pattern      Past Surgical History:  Past Surgical History:   Procedure Laterality Date    ANTERIOR CERVICAL DISCECTOMY W/ FUSION N/A 09/25/2024    Procedure: CERVICAL DISCECTOMY ANTERIOR WITH FUSION C4-5,5-6,6-7;  Surgeon: Cj Daley MD;  Location: [facility] OR;  Service:  Neurosurgery;  Laterality: N/A;    SPINE SURGERY  9/25/2024    Cervical spine    TONSILECTOMY, ADENOIDECTOMY, BILATERAL MYRINGOTOMY AND TUBES      TONSILLECTOMY  1971     Social History:  reports that he quit smoking about 10 years ago. His smoking use included cigarettes. He started smoking about 47 years ago. He has a 37 pack-year smoking history. He has been exposed to tobacco smoke. He has never used smokeless tobacco. He reports current alcohol use of about 4.0 - 5.0 standard drinks of alcohol per week. He reports that he does not use drugs.    Family History: family history includes Arthritis in his father and mother; Dementia in his mother; Diabetes in his father and mother; Heart failure in his mother; Hypertension in his brother and father; Leukemia in his father; Stroke in his mother; Thyroid cancer in his sister.      Allergies:  No Known Allergies  Medications:  Prior to Admission medications    Medication Sig Start Date End Date Taking? Authorizing Provider   atenolol (TENORMIN) 25 MG tablet Take 1 tablet by mouth Daily. 8/14/24  Yes Óscar Lehman MD   cyclobenzaprine (FLEXERIL) 10 MG tablet Take 1 tablet by mouth 3 (Three) Times a Day As Needed for Muscle Spasms. 9/26/24  Yes Yang Granados APRN   folic acid (FOLVITE) 1 MG tablet Take 1 tablet by mouth Daily. 8/14/24  Yes Óscar Lehman MD   hydroxychloroquine (PLAQUENIL) 200 MG tablet Take 2 tablets by mouth Daily. Indications: Rheumatoid Arthritis 8/14/24  Yes Óscar Lehman MD   naloxone (NARCAN) 4 MG/0.1ML nasal spray Call 911. Don't prime. [place] in 1 nostril for overdose. Repeat in 2-3 minutes in other nostril if no or minimal breathing/responsiveness. 9/26/24  Yes Yang Granados APRN   oxyCODONE-acetaminophen (Percocet)  MG per tablet Take 1 tablet by mouth Every 8 (Eight) Hours As Needed for Moderate Pain. 10/31/24  Yes Óscar Lehman MD   sildenafil (VIAGRA) 100 MG tablet Take 1  "tablet by mouth Daily As Needed for Erectile Dysfunction. 8/14/24  Yes Óscar Lehman MD   traZODone (DESYREL) 50 MG tablet Take 1 tablet by mouth Every Night. 8/14/24  Yes Óscar Lehman MD             Review of systems   negative unless otherwise specified above in HPI    Objective     Vital Signs: /90 (BP Location: Left arm, Patient Position: Sitting, Cuff Size: Adult)   Pulse 62   Temp 98 °F (36.7 °C) (Infrared)   Ht 170.2 cm (67\")   Wt 81.6 kg (180 lb)   SpO2 98%   BMI 28.19 kg/m²     Physical Exam  Vitals and nursing note reviewed.   Constitutional:       General: He is not in acute distress.     Appearance: Normal appearance.   HENT:      Head: Normocephalic.   Eyes:      Extraocular Movements: Extraocular movements intact.      Pupils: Pupils are equal, round, and reactive to light.   Cardiovascular:      Rate and Rhythm: Normal rate and regular rhythm.      Heart sounds: Normal heart sounds. No murmur heard.  Pulmonary:      Effort: Pulmonary effort is normal. No respiratory distress.      Breath sounds: Normal breath sounds. No rhonchi or rales.   Abdominal:      General: Abdomen is flat. Bowel sounds are normal.      Palpations: Abdomen is soft.   Neurological:      General: No focal deficit present.      Mental Status: He is alert.                Results Reviewed:  Glucose   Date Value Ref Range Status   09/26/2024 102 (H) 65 - 99 mg/dL Final     BUN   Date Value Ref Range Status   09/26/2024 13 6 - 20 mg/dL Final   06/17/2022 39 (H) 6 - 20 mg/dL Final     Creatinine   Date Value Ref Range Status   09/26/2024 0.98 0.76 - 1.27 mg/dL Final   06/17/2022 1.23 0.80 - 1.30 mg/dL Final     Sodium   Date Value Ref Range Status   09/26/2024 136 136 - 145 mmol/L Final   06/17/2022 134 (L) 136 - 144 mmol/L Final     Potassium   Date Value Ref Range Status   09/26/2024 4.2 3.5 - 5.2 mmol/L Final   06/17/2022 4.6 3.5 - 5.1 mmol/L Final     Chloride   Date Value Ref Range Status " home   09/26/2024 102 98 - 107 mmol/L Final   06/17/2022 105 100 - 110 mmol/L Final     CO2   Date Value Ref Range Status   09/26/2024 27.0 22.0 - 29.0 mmol/L Final     Calcium   Date Value Ref Range Status   09/26/2024 8.3 (L) 8.6 - 10.5 mg/dL Final   06/17/2022 8.6 (L) 8.9 - 10.3 mg/dL Final     ALT (SGPT)   Date Value Ref Range Status   09/04/2024 16 0 - 44 IU/L Final   06/16/2022 11 <=41 U/L Final     AST (SGOT)   Date Value Ref Range Status   09/04/2024 17 0 - 40 IU/L Final   06/16/2022 13 <=40 U/L Final     WBC   Date Value Ref Range Status   09/26/2024 7.89 3.40 - 10.80 10*3/mm3 Final   09/04/2024 6.2 3.4 - 10.8 x10E3/uL Final     Hematocrit   Date Value Ref Range Status   09/26/2024 34.4 (L) 37.5 - 51.0 % Final     Platelets   Date Value Ref Range Status   09/26/2024 175 140 - 450 10*3/mm3 Final     Triglycerides   Date Value Ref Range Status   09/04/2024 292 (H) 0 - 149 mg/dL Final     HDL Cholesterol   Date Value Ref Range Status   09/04/2024 46 >39 mg/dL Final     LDL Chol Calc (NIH)   Date Value Ref Range Status   09/04/2024 70 0 - 99 mg/dL Final     Hemoglobin A1C   Date Value Ref Range Status   09/04/2024 5.5 4.8 - 5.6 % Final     Comment:              Prediabetes: 5.7 - 6.4           Diabetes: >6.4           Glycemic control for adults with diabetes: <7.0               Procedure   Procedures       Assessment / Plan     Assessment/Plan:   Diagnosis Plan   1. Primary hypertension        2. Degeneration of cervical intervertebral disc  oxyCODONE-acetaminophen (Percocet)  MG per tablet      3. Degenerative disc disease, cervical  oxyCODONE-acetaminophen (Percocet)  MG per tablet      4. Spinal stenosis in cervical region  oxyCODONE-acetaminophen (Percocet)  MG per tablet      5. Rheumatoid arthritis involving multiple sites with positive rheumatoid factor  oxyCODONE-acetaminophen (Percocet)  MG per tablet            No follow-ups on file. unless patient needs to be seen sooner or acute  issues arise.      I have discussed the patient results/orders and and plan/recommendation with them at today's visit.      Signed by:    Óscar Lehman MD Date: 11/21/24

## 2024-12-17 ENCOUNTER — HOSPITAL ENCOUNTER (OUTPATIENT)
Dept: GENERAL RADIOLOGY | Facility: HOSPITAL | Age: 59
Discharge: HOME | End: 2024-12-17
Admitting: NEUROLOGICAL SURGERY
Payer: MEDICARE

## 2024-12-17 ENCOUNTER — OFFICE VISIT (OUTPATIENT)
Dept: NEUROSURGERY | Facility: CLINIC | Age: 59
End: 2024-12-17
Payer: MEDICARE

## 2024-12-17 VITALS — BODY MASS INDEX: 29.03 KG/M2 | WEIGHT: 185 LBS | HEIGHT: 67 IN

## 2024-12-17 DIAGNOSIS — Z87.891 FORMER SMOKER: ICD-10-CM

## 2024-12-17 DIAGNOSIS — G89.29 CHRONIC BILATERAL LOW BACK PAIN WITHOUT SCIATICA: ICD-10-CM

## 2024-12-17 DIAGNOSIS — M54.50 CHRONIC BILATERAL LOW BACK PAIN WITHOUT SCIATICA: ICD-10-CM

## 2024-12-17 DIAGNOSIS — M48.02 SPINAL STENOSIS IN CERVICAL REGION: Primary | ICD-10-CM

## 2024-12-17 DIAGNOSIS — M50.30 DEGENERATION OF CERVICAL INTERVERTEBRAL DISC: ICD-10-CM

## 2024-12-17 DIAGNOSIS — E66.3 OVERWEIGHT WITH BODY MASS INDEX (BMI) OF 28 TO 28.9 IN ADULT: ICD-10-CM

## 2024-12-17 PROCEDURE — 99024 POSTOP FOLLOW-UP VISIT: CPT | Performed by: NEUROLOGICAL SURGERY

## 2024-12-17 PROCEDURE — 1159F MED LIST DOCD IN RCRD: CPT | Performed by: NEUROLOGICAL SURGERY

## 2024-12-17 PROCEDURE — 72114 X-RAY EXAM L-S SPINE BENDING: CPT

## 2024-12-17 PROCEDURE — 1160F RVW MEDS BY RX/DR IN RCRD: CPT | Performed by: NEUROLOGICAL SURGERY

## 2024-12-17 RX ORDER — DICLOFENAC SODIUM 75 MG/1
1 TABLET, DELAYED RELEASE ORAL EVERY 12 HOURS SCHEDULED
COMMUNITY
Start: 2024-12-10

## 2024-12-17 NOTE — PATIENT INSTRUCTIONS
"PATIENT TO CONTINUE TO FOLLOW UP WITH HIS PRIMARY CARE PROVIDER FOR YEARLY PHYSICAL EXAMS TO ENSURE COMPLETE HEALTH MAINTENANCE      BMI for Adults  Body mass index (BMI) is a number found using a person's weight and height. BMI can help tell how much of a person's weight is made up of fat. BMI does not measure body fat directly. It is used instead of tests that directly measure body fat, which can be difficult and expensive.  What are BMI measurements used for?  BMI is useful to:  Find out if your weight puts you at higher risk for medical problems.  Help recommend changes, such as in diet and exercise. This can help you reach a healthy weight. BMI screening can be done again to see if these changes are working.  How is BMI calculated?  Your height and weight are measured. The BMI is found from those numbers. This can be done with [place] or metric measurements. Note that charts and online BMI calculators are available to help you find your BMI quickly and easily without doing these calculations.  To calculate your BMI in [place] measurements:  Measure your weight in pounds (lb).  Multiply the number of pounds by 703.  So, for an adult who weighs 150 lb, multiply that number by 703: 150 x 703, which equals 105,450.  Measure your height in inches. Then multiply that number by itself to get a measurement called \"inches squared.\"  So, for an adult who is 70 inches tall, the \"inches squared\" measurement is 70 inches x 70 inches, which equals 4,900 inches squared.  Divide the total from step 2 (number of lb x 703) by the total from step 3 (inches squared): 105,450 ÷ 4,900 = 21.5. This is your BMI.  To calculate your BMI in metric measurements:    Measure your weight in kilograms (kg).  For this example, the weight is 70 kg.  Measure your height in meters (m). Then multiply that number by itself to get a measurement called \"meters squared.\"  So, for an adult who is 1.75 m tall, the \"meters squared\" measurement is 1.75 m x 1.75 " m, which equals 3.1 meters squared.  Divide the number of kilograms (your weight) by the meters squared number. In this example: 70 ÷ 3.1 = 22.6. This is your BMI.  What do the results mean?  BMI charts are used to see if you are underweight, normal weight, overweight, or obese. The following guidelines will be used:  Underweight: BMI less than 18.5.  Normal weight: BMI between 18.5 and 24.9.  Overweight: BMI between 25 and 29.9.  Obese: BMI of 30 or above.  BMI is a tool and cannot diagnose a condition. Talk with your health care provider about what your BMI means for you. Keep these notes in mind:  Weight includes fat and muscle. Someone with a muscular build, such as an athlete, may have a BMI that is higher than 24.9. In cases like these, BMI is not a correct measure of body fat.  If you have a BMI of 25 or higher, your provider may need to do more testing to find out if excess body fat is the cause.  BMI is measured the same way for males and females. Females usually have more body fat than males of the same height and weight.  Where to find more information  For more information about BMI, including tools to quickly find your BMI, go to:  Centers for Disease Control and Prevention: cdc.gov  American Heart Association: heart.org  National Heart, Lung, and Blood [place]: nhlbi.nih.gov  This information is not intended to replace advice given to you by your health care provider. Make sure you discuss any questions you have with your health care provider.  Document Revised: 09/07/2023 Document Reviewed: 08/31/2023  [employer] Patient Education © 2024 [employer]  DASH Eating Plan  DASH stands for Dietary Approaches to Stop Hypertension. The DASH eating plan is a healthy eating plan that has been shown to:  Lower high blood pressure (hypertension).  Reduce your risk for type 2 diabetes, heart disease, and stroke.  Help with weight loss.  What are tips for following this plan?  Reading food labels  Check food  "labels for the amount of salt (sodium) per serving. Choose foods with less than 5 percent of the Daily Value (DV) of sodium. In general, foods with less than 300 milligrams (mg) of sodium per serving fit into this eating plan.  To find whole grains, look for the word \"whole\" as the first word in the ingredient list.  Shopping  Buy products labeled as \"low-sodium\" or \"no salt added.\"  Buy fresh foods. Avoid canned foods and pre-made or frozen meals.  Cooking  Try not to add salt when you cook. Use salt-free seasonings or herbs instead of table salt or sea salt. Check with your health care provider or pharmacist before using salt substitutes.  Do not vicente foods. Cook foods in healthy ways, such as baking, boiling, grilling, roasting, or broiling.  Cook using oils that are good for your heart. These include olive, canola, avocado, soybean, and sunflower oil.  Meal planning    Eat a balanced diet. This should include:  4 or more servings of fruits and 4 or more servings of vegetables each day. Try to fill half of your plate with fruits and vegetables.  6-8 servings of whole grains each day.  6 or less servings of lean meat, poultry, or fish each day. 1 oz is 1 serving. A 3 oz (85 g) serving of meat is about the same size as the palm of your hand. One egg is 1 oz (28 g).  2-3 servings of low-fat dairy each day. One serving is 1 cup (237 mL).  1 serving of nuts, seeds, or beans 5 times each week.  2-3 servings of heart-healthy fats. Healthy fats called omega-3 fatty acids are found in foods such as walnuts, flaxseeds, fortified milks, and eggs. These fats are also found in cold-water fish, such as sardines, salmon, and mackerel.  Limit how much you eat of:  Canned or prepackaged foods.  Food that is high in trans fat, such as fried foods.  Food that is high in saturated fat, such as fatty meat.  Desserts and other sweets, sugary drinks, and other foods with added sugar.  Full-fat dairy products.  Do not salt foods before " eating.  Do not eat more than 4 egg yolks a week.  Try to eat at least 2 vegetarian meals a week.  Eat more home-cooked food and less restaurant, buffet, and fast food.  Lifestyle  When eating at a restaurant, ask if your food can be made with less salt or no salt.  If you drink alcohol:  Limit how much you have to:  0-1 drink a day if you are female.  0-2 drinks a day if you are male.  Know how much alcohol is in your drink. In the [place] one drink is one 12 oz bottle of beer (355 mL), one 5 oz glass of wine (148 mL), or one 1½ oz glass of hard liquor (44 mL).  General information  Avoid eating more than 2,300 mg of salt a day. If you have hypertension, you may need to reduce your sodium intake to 1,500 mg a day.  Work with your provider to stay at a healthy body weight or lose weight. Ask what the best weight range is for you.  On most days of the week, get at least 30 minutes of exercise that causes your heart to beat faster. This may include walking, swimming, or biking.  Work with your provider or dietitian to adjust your eating plan to meet your specific calorie needs.  What foods should I eat?  Fruits  All fresh, dried, or frozen fruit. Canned fruits that are in their natural juice and do not have sugar added to them.  Vegetables  Fresh or frozen vegetables that are raw, steamed, roasted, or grilled. Low-sodium or reduced-sodium tomato and vegetable juice. Low-sodium or reduced-sodium tomato sauce and tomato paste. Low-sodium or reduced-sodium canned vegetables.  Grains  Whole-grain or whole-wheat bread. Whole-grain or whole-wheat pasta. Brown rice. Oatmeal. Quinoa. Bulgur. Whole-grain and low-sodium cereals. Ciara bread. Low-fat, low-sodium crackers. Whole-wheat flour tortillas.  Meats and other proteins  Skinless chicken or turkey. Ground chicken or turkey. Pork with fat trimmed off. Fish and seafood. Egg whites. Dried beans, peas, or lentils. Unsalted nuts, nut butters, and seeds. Unsalted canned beans. Lean  cuts of beef with fat trimmed off. Low-sodium, lean precooked or cured meat, such as sausages or meat loaves.  Dairy  Low-fat (1%) or fat-free (skim) milk. Reduced-fat, low-fat, or fat-free cheeses. Nonfat, low-sodium ricotta or cottage cheese. Low-fat or nonfat yogurt. Low-fat, low-sodium cheese.  Fats and oils  Soft margarine without trans fats. Vegetable oil. Reduced-fat, low-fat, or light mayonnaise and salad dressings (reduced-sodium). Canola, safflower, olive, avocado, soybean, and sunflower oils. Avocado.  Seasonings and condiments  Herbs. Spices. Seasoning mixes without salt.  Other foods  Unsalted popcorn and pretzels. Fat-free sweets.  The items listed above may not be all the foods and drinks you can have. Talk to a dietitian to learn more.  What foods should I avoid?  Fruits  Canned fruit in a light or heavy syrup. Fried fruit. Fruit in cream or butter sauce.  Vegetables  Creamed or fried vegetables. Vegetables in a cheese sauce. Regular canned vegetables that are not marked as low-sodium or reduced-sodium. Regular canned tomato sauce and paste that are not marked as low-sodium or reduced-sodium. Regular tomato and vegetable juices that are not marked as low-sodium or reduced-sodium. Pickles. Olives.  Grains  Baked goods made with fat, such as croissants, muffins, or some breads. Dry pasta or rice meal packs.  Meats and other proteins  Fatty cuts of meat. Ribs. Fried meat. Rodriguez. Bologna, salami, and other precooked or cured meats, such as sausages or meat loaves, that are not lean and low in sodium. Fat from the back of a pig (fatback). Bratwurst. Salted nuts and seeds. Canned beans with added salt. Canned or smoked fish. Whole eggs or egg yolks. Chicken or turkey with skin.  Dairy  Whole or 2% milk, cream, and half-and-half. Whole or full-fat cream cheese. Whole-fat or sweetened yogurt. Full-fat cheese. Nondairy creamers. Whipped toppings. Processed cheese and cheese spreads.  Fats and oils  Butter.  Stick margarine. Lard. Shortening. Ghee. Rodriguez fat. Tropical oils, such as coconut, palm kernel, or palm oil.  Seasonings and condiments  Onion salt, garlic salt, seasoned salt, table salt, and sea salt. Worcestershire sauce. Tartar sauce. Barbecue sauce. Teriyaki sauce. Soy sauce, including reduced-sodium soy sauce. Steak sauce. Canned and packaged gravies. Fish sauce. Oyster sauce. Cocktail sauce. Store-bought horseradish. Ketchup. Mustard. Meat flavorings and tenderizers. Bouillon cubes. Hot sauces. Pre-made or packaged marinades. Pre-made or packaged taco seasonings. Relishes. Regular salad dressings.  Other foods  Salted popcorn and pretzels.  The items listed above may not be all the foods and drinks you should avoid. Talk to a dietitian to learn more.  Where to find more information  National Heart, Lung, and Blood [place] (NHLBI): nhlbi.nih.gov  American Heart Association (AHA): heart.org  Academy of Nutrition and Dietetics: eatright.org  National Kidney Foundation (NKF): kidney.org  This information is not intended to replace advice given to you by your health care provider. Make sure you discuss any questions you have with your health care provider.  Document Revised: 01/04/2024 Document Reviewed: 01/04/2024  [employer] Patient Education © 2024 [employer]

## 2024-12-17 NOTE — PROGRESS NOTES
"Problem: Patient Care Overview  Goal: Plan of Care Review  Outcome: Ongoing (interventions implemented as appropriate)  Pt. Stated,"I'm doing pretty good."      " SUBJECTIVE:  Patient ID: Lars Rand is a 59 y.o. male is here today for follow-up.    Chief Complaint: Neck pain  Chief Complaint   Patient presents with    Post-op     Patient is status post C4-7 ACDF on 9/25/24.  Patient doing well as far as his neck is concerned but he states his low back is hurting.       HPI  59-year-old gentleman who presents today after C4-5, 5 6, 6 7 ACDF on September 25, 2024.  He is doing well.  He has no neck pain.  He is a little bit of shoulder pain when he does too much activities.  Otherwise he is very satisfied with results of his surgery.  At this point he can resume his rheumatoid arthritis medications.    He is also complaining of low back pain.  His low back pain is constant to some degree.  Is been going on for several years.  It is worse with any sort of activities.  He denies any lower extremity radicular pain numbness or tingling.  He has never done any physical therapy or chiropractic care for the low back.  He has had no injection treatments.    The following portions of the patient's history were reviewed and updated as appropriate: allergies, current medications, past family history, past medical history, past social history, past surgical history and problem list.    OBJECTIVE:    Review of Systems   Musculoskeletal:  Positive for arthralgias, back pain, myalgias and neck pain.   All other systems reviewed and are negative.         Physical Exam  Constitutional:       General: He is awake.   Eyes:      Extraocular Movements: Extraocular movements intact.      Pupils: Pupils are equal, round, and reactive to light.   Neurological:      Motor: Motor strength is normal.     Deep Tendon Reflexes: Reflexes are normal and symmetric.   Psychiatric:         Speech: Speech normal.         Neurological Exam  Mental Status  Awake. Oriented to person, place and time. Speech is normal. Language is fluent with no aphasia. Attention and concentration are normal.    Cranial  Nerves  CN I: Sense of smell is normal.  CN II: Right normal visual field. Left normal visual field.  CN III, IV, VI: Extraocular movements intact bilaterally. Pupils equal round and reactive to light bilaterally.  CN V: Facial sensation is normal.  CN VII:  Right: There is no facial weakness.  Left: There is no facial weakness.  CN XI: Shoulder shrug strength is normal.  CN XII: Tongue midline without atrophy or fasciculations.    Motor  Normal muscle bulk throughout. Normal muscle tone. Strength is 5/5 throughout all four extremities.    Sensory  Sensation is intact to light touch, pinprick, vibration and proprioception in all four extremities.    Reflexes  Deep tendon reflexes are 2+ and symmetric in all four extremities.    Gait  Normal casual, toe, heel and tandem gait.      Independent Review of Radiographic Studies:   X-rays of the cervical spine show good positioning of all interbody devices and hardware.    ASSESSMENT/PLAN:  Cervicalgia.  The patient is doing well after his surgery.  Most of his preoperative symptoms have resolved.  We talked about activity restrictions going forward.  Will see him in follow-up in 3 months.  Low back pain.  The patient does have a history of rheumatoid arthritis which is severe.  Organ to start with plain x-rays of his low back and an MRI of the lumbar spine.  Will see him in follow-up in 3 months.      1. Spinal stenosis in cervical region    2. Degeneration of cervical intervertebral disc    3. Chronic bilateral low back pain without sciatica    4. Overweight with body mass index (BMI) of 28 to 28.9 in adult    5. Former smoker        The patient's Body mass index is 28.98 kg/m².. BMI is above normal parameters. Recommendations include: educational material    Return in about 3 months (around 3/17/2025).      Cj Daley MD

## 2024-12-19 ENCOUNTER — OFFICE VISIT (OUTPATIENT)
Dept: INTERNAL MEDICINE | Facility: CLINIC | Age: 59
End: 2024-12-19
Payer: MEDICARE

## 2024-12-19 VITALS
DIASTOLIC BLOOD PRESSURE: 83 MMHG | HEART RATE: 71 BPM | TEMPERATURE: 98.4 F | SYSTOLIC BLOOD PRESSURE: 131 MMHG | HEIGHT: 67 IN | BODY MASS INDEX: 29.03 KG/M2 | OXYGEN SATURATION: 95 % | WEIGHT: 185 LBS

## 2024-12-19 DIAGNOSIS — M05.79 RHEUMATOID ARTHRITIS INVOLVING MULTIPLE SITES WITH POSITIVE RHEUMATOID FACTOR: ICD-10-CM

## 2024-12-19 DIAGNOSIS — Z12.2 SCREENING FOR LUNG CANCER: ICD-10-CM

## 2024-12-19 DIAGNOSIS — Z87.891 PERSONAL HISTORY OF TOBACCO USE, PRESENTING HAZARDS TO HEALTH: ICD-10-CM

## 2024-12-19 DIAGNOSIS — M50.30 DEGENERATIVE DISC DISEASE, CERVICAL: ICD-10-CM

## 2024-12-19 DIAGNOSIS — M50.30 DEGENERATION OF CERVICAL INTERVERTEBRAL DISC: ICD-10-CM

## 2024-12-19 DIAGNOSIS — E78.2 MIXED HYPERLIPIDEMIA: Primary | ICD-10-CM

## 2024-12-19 DIAGNOSIS — M48.02 SPINAL STENOSIS IN CERVICAL REGION: ICD-10-CM

## 2024-12-19 DIAGNOSIS — Z87.891 PERSONAL HISTORY OF NICOTINE DEPENDENCE: ICD-10-CM

## 2024-12-19 PROCEDURE — 99214 OFFICE O/P EST MOD 30 MIN: CPT | Performed by: FAMILY MEDICINE

## 2024-12-19 PROCEDURE — G0296 VISIT TO DETERM LDCT ELIG: HCPCS | Performed by: FAMILY MEDICINE

## 2024-12-19 PROCEDURE — 3079F DIAST BP 80-89 MM HG: CPT | Performed by: FAMILY MEDICINE

## 2024-12-19 PROCEDURE — 3075F SYST BP GE 130 - 139MM HG: CPT | Performed by: FAMILY MEDICINE

## 2024-12-19 PROCEDURE — 1125F AMNT PAIN NOTED PAIN PRSNT: CPT | Performed by: FAMILY MEDICINE

## 2024-12-19 RX ORDER — ATORVASTATIN CALCIUM 20 MG/1
20 TABLET, FILM COATED ORAL DAILY
Qty: 30 TABLET | Refills: 0 | Status: SHIPPED | OUTPATIENT
Start: 2024-12-19

## 2024-12-19 RX ORDER — OXYCODONE AND ACETAMINOPHEN 10; 325 MG/1; MG/1
1 TABLET ORAL EVERY 8 HOURS PRN
Qty: 90 TABLET | Refills: 0 | Status: SHIPPED | OUTPATIENT
Start: 2024-12-19

## 2024-12-19 NOTE — PROGRESS NOTES
Subjective     Chief Complaint   Patient presents with    Arthritis    Neck Pain       History of Present Illness    Patient's PMR from outside medical facility reviewed and noted.    Lars Rand is a 59 y.o. male who presents for their chronic complaint.  He comes in secondary to chronic neck pain dates back to an accident which occurred in 2022 prior to that patient had also had a longstanding history of rheumatoid arthritis with multi joint painand had been following with Dr. Quijano.   his pain is stable, well controlled, and improved after surgery.    I have discussed with him the possibility of addiction, overdose, as well as the limited benefits and other risks of opioid use.  Patient reports a greater than 30% continuous improvement in functional capability on the 3 item PEG assessment scale since his initial assessment, his Edvin has been reviewed, and risk for diversion or abuse appears low.  The patient states that he has no side effects of his medication, does not appear oversedated, states that he is not modifying his own regimen, and that he is not taking any other illicit substances.  The patient has never had an overdose or needed a rescue medication we are aware of at this office.  A plan is in place discussed when initiating his narcotics for discontinuation should problems arise or pain improve.    Pain characteristics:  Current pain level : 4/10 as reported by patient.    Pain Description :  numbness, dull, and ache. does radiate and occurs several times per day.   Alleviating factors :   pain medication  Aggravating factors : standing and walking  Associated Symptoms:  patient denies any problems        PEG scale:  1. Pain on average over the Last week 4/10  2. Patient states that durring the past week that thier pain has interfered with his enjoyment of life 4/10  with 10 bieng complete interference.  3. The patient also states that during the past week that that his pain has  interfered with his general activity 4/10  with 10 bieng complete interference      Final Peg Score: 4    Patient states he is set up for a new MRI of his spine secondary to his chronic pain is following up with his back surgeon for this.  Also will start him on some cholesterol medicine due to elevated mixed hyperlipidemia    No other problems, complaints. or concerns noted.          Low-Dose Lung Cancer CT Screening Visit    CHIEF COMPLAINT:    Shared Decision Making  I am discussing tobacco cessation today with Lars Rand    SMOKING HISTORY:     Social History     Tobacco Use   Smoking Status Former    Current packs/day: 0.00    Average packs/day: 1 pack/day for 37.0 years (37.0 ttl pk-yrs)    Types: Cigarettes    Start date: 1/1/1977    Quit date: 2014    Years since quitting: 10.9    Passive exposure: Past   Smokeless Tobacco Never       SUBJECTIVE:     Lars Rand is a former smoker quitting 10 years ago with a  30  pack year history.  he reports no use of alternate forms of tobacco, electronic cigarettes, marijuana or other substances.  Based on the recommendation of the [place] Preventive Services Task Force, this patient is at high risk for lung cancer and a low-dose CT screening scan is recommended.     The patient has had no hemoptysis, unintentional weight loss or increasing shortness of breath. The patient is asymptomatic and has no signs or symptoms of lung cancer.     Together we discussed the potential benefits and potential harms of being screened for lung cancer including the potential for follow up diagnostic testing, risk for over diagnosis, false positive rate and radiation exposure using the [facility] Lung Cancer Screening Shared Decision-Making Tool. A copy of this decision aid resource has been provided in the after visit summary.  We also reviewed the patient's smoking history and counseled him on the importance and health benefits of maintaining cigarette smoking  "abstinence.      OBJECTIVE:    /83 (BP Location: Left arm, Patient Position: Sitting, Cuff Size: Adult)   Pulse 71   Temp 98.4 °F (36.9 °C) (Infrared)   Ht 170.2 cm (67\")   Wt 83.9 kg (185 lb)   SpO2 95%   BMI 28.98 kg/m²   General: no distress, alert and oriented  Chest: Lung sounds are clear to auscultation, no wheezes, rales or rhonchi, with symmetric air entry. No respiratory distress  Cardiovascular: RRR with no murmur auscultated      Continued Smoking Abstinence discussion:     We discussed that there are a number of resources and interventions to assist with smoking cessation if needed in the future.   On a scale of zero to ten, the patient rates their motivation to stay quit at a 10 out of 10 today.  The patient is confident that they will never smoke in the future.    Recommendations for continued lung cancer screening:      We discussed the NCCN guidelines for lung cancer screening and the patient verbalized understanding that annual screening is recommended until fifteen years beyond smoking as long as they have no other disease or comorbidity that would prevent them from receiving cancer treatments such as surgery should a lung cancer be detected.  After review of the NCCN guidelines and recommendations for ongoing screening, the patient verbalized understanding of recommendations for follow-up.  The patient has decided to proceed with a Low Dose Lung Cancer Screening CT today.       10minutes face-to-face spent counseling patient on the continued health benefits of having quit tobacco, maintaining smoking abstinence, smoking cessation strategies and resources, as well as the importance of adherence to annual lung cancer low-dose CT screening.     No other problems, complaints. or concerns noted.      Current MME total: 45    Objective Radiological Findings:  Degenerative changes, most advanced at C5-6 and C6-7. On cervical spine xray, Possible partial fusion of the inferior sacroiliac " joints on lumbar xray, positive RF     Trearment goals: reduced pain, Increased activities of daily living     History:              Duration of the patient's pain: since before 2010              Legal issues? NO              Current controlled medication and doses: percocet              Medications that have failed: nsaids, norco              Side effects of current medications: yes              Satisfaction with treatment: yes     Functional Status:              Able to feed self: YES              Able to bathe self: YES              Able to use the toilet independently: YES              Able to dress self: YES              Able to get up from bed or chair without assistance: yes     Adverse Events:              Constipation: no              Lethargy: NO                Aberrant Behavior:              Over dose: NO              Run out of medications early: NO              Last UDS consistent: yes              Taking medications as prescribed: YES     Past Medical History:   Past Medical History:   Diagnosis Date    Arthritis     Cervical disc disorder 01/02/2022    Erectile dysfunction 04/2023    GERD (gastroesophageal reflux disease)     Heart attack     Hypertension     Low back pain     Lumbosacral disc disease     Degenerative Disk Disease    Sleep apnea     Visual impairment 2022    Rosie-Parkinson-White (WPW) pattern      Past Surgical History:  Past Surgical History:   Procedure Laterality Date    ANTERIOR CERVICAL DISCECTOMY W/ FUSION N/A 09/25/2024    Procedure: CERVICAL DISCECTOMY ANTERIOR WITH FUSION C4-5,5-6,6-7;  Surgeon: Cj Daley MD;  Location: [facility];  Service: Neurosurgery;  Laterality: N/A;    EPIDURAL BLOCK  02/2024    NECK SURGERY  9/25/2024    SPINE SURGERY  9/25/2024    Cervical spine    TONSILECTOMY, ADENOIDECTOMY, BILATERAL MYRINGOTOMY AND TUBES      TONSILLECTOMY  1971     Social History:  reports that he quit smoking about 10 years ago. His smoking use included cigarettes. He  started smoking about 47 years ago. He has a 37 pack-year smoking history. He has been exposed to tobacco smoke. He has never used smokeless tobacco. He reports current alcohol use of about 4.0 - 5.0 standard drinks of alcohol per week. He reports that he does not use drugs.    Family History: family history includes Arthritis in his father and mother; Dementia in his mother; Diabetes in his father and mother; Heart failure in his mother; Hypertension in his brother and father; Leukemia in his father; Stroke in his mother; Thyroid cancer in his sister.      Allergies:  No Known Allergies  Medications:  Prior to Admission medications    Medication Sig Start Date End Date Taking? Authorizing Provider   atenolol (TENORMIN) 25 MG tablet Take 1 tablet by mouth Daily. 8/14/24  Yes Óscar Lehman MD   cyclobenzaprine (FLEXERIL) 10 MG tablet Take 1 tablet by mouth 3 (Three) Times a Day As Needed for Muscle Spasms. 9/26/24  Yes Yang Granados APRN   folic acid (FOLVITE) 1 MG tablet Take 1 tablet by mouth Daily. 8/14/24  Yes Óscar Lehman MD   hydroxychloroquine (PLAQUENIL) 200 MG tablet Take 2 tablets by mouth Daily. Indications: Rheumatoid Arthritis 8/14/24  Yes Óscar Lehman MD   naloxone (NARCAN) 4 MG/0.1ML nasal spray Call 911. Don't prime. [place] in 1 nostril for overdose. Repeat in 2-3 minutes in other nostril if no or minimal breathing/responsiveness. 9/26/24  Yes Yang Granados APRN   oxyCODONE-acetaminophen (Percocet)  MG per tablet Take 1 tablet by mouth Every 8 (Eight) Hours As Needed for Moderate Pain. 10/31/24  Yes Óscar Lehman MD   sildenafil (VIAGRA) 100 MG tablet Take 1 tablet by mouth Daily As Needed for Erectile Dysfunction. 8/14/24  Yes Óscar Lehman MD   traZODone (DESYREL) 50 MG tablet Take 1 tablet by mouth Every Night. 8/14/24  Yes Óscar Lehman MD             Review of systems   negative unless otherwise specified  "above in HPI    Objective     Vital Signs: /83 (BP Location: Left arm, Patient Position: Sitting, Cuff Size: Adult)   Pulse 71   Temp 98.4 °F (36.9 °C) (Infrared)   Ht 170.2 cm (67\")   Wt 83.9 kg (185 lb)   SpO2 95%   BMI 28.98 kg/m²     Physical Exam  Vitals and nursing note reviewed.   Constitutional:       General: He is not in acute distress.     Appearance: Normal appearance.   HENT:      Head: Normocephalic.   Eyes:      Extraocular Movements: Extraocular movements intact.      Pupils: Pupils are equal, round, and reactive to light.   Cardiovascular:      Rate and Rhythm: Normal rate and regular rhythm.      Heart sounds: Normal heart sounds. No murmur heard.  Pulmonary:      Effort: Pulmonary effort is normal. No respiratory distress.      Breath sounds: Normal breath sounds. No rhonchi or rales.   Abdominal:      General: Abdomen is flat. Bowel sounds are normal.      Palpations: Abdomen is soft.   Neurological:      General: No focal deficit present.      Mental Status: He is alert.                Results Reviewed:  Glucose   Date Value Ref Range Status   09/26/2024 102 (H) 65 - 99 mg/dL Final     BUN   Date Value Ref Range Status   09/26/2024 13 6 - 20 mg/dL Final   06/17/2022 39 (H) 6 - 20 mg/dL Final     Creatinine   Date Value Ref Range Status   09/26/2024 0.98 0.76 - 1.27 mg/dL Final   06/17/2022 1.23 0.80 - 1.30 mg/dL Final     Sodium   Date Value Ref Range Status   09/26/2024 136 136 - 145 mmol/L Final   06/17/2022 134 (L) 136 - 144 mmol/L Final     Potassium   Date Value Ref Range Status   09/26/2024 4.2 3.5 - 5.2 mmol/L Final   06/17/2022 4.6 3.5 - 5.1 mmol/L Final     Chloride   Date Value Ref Range Status   09/26/2024 102 98 - 107 mmol/L Final   06/17/2022 105 100 - 110 mmol/L Final     CO2   Date Value Ref Range Status   09/26/2024 27.0 22.0 - 29.0 mmol/L Final     Calcium   Date Value Ref Range Status   09/26/2024 8.3 (L) 8.6 - 10.5 mg/dL Final   06/17/2022 8.6 (L) 8.9 - 10.3 mg/dL " Final     ALT (SGPT)   Date Value Ref Range Status   09/04/2024 16 0 - 44 IU/L Final   06/16/2022 11 <=41 U/L Final     AST (SGOT)   Date Value Ref Range Status   09/04/2024 17 0 - 40 IU/L Final   06/16/2022 13 <=40 U/L Final     WBC   Date Value Ref Range Status   09/26/2024 7.89 3.40 - 10.80 10*3/mm3 Final   09/04/2024 6.2 3.4 - 10.8 x10E3/uL Final     Hematocrit   Date Value Ref Range Status   09/26/2024 34.4 (L) 37.5 - 51.0 % Final     Platelets   Date Value Ref Range Status   09/26/2024 175 140 - 450 10*3/mm3 Final     Triglycerides   Date Value Ref Range Status   09/04/2024 292 (H) 0 - 149 mg/dL Final     HDL Cholesterol   Date Value Ref Range Status   09/04/2024 46 >39 mg/dL Final     LDL Chol Calc (NIH)   Date Value Ref Range Status   09/04/2024 70 0 - 99 mg/dL Final     Hemoglobin A1C   Date Value Ref Range Status   09/04/2024 5.5 4.8 - 5.6 % Final     Comment:              Prediabetes: 5.7 - 6.4           Diabetes: >6.4           Glycemic control for adults with diabetes: <7.0               Procedure   Procedures       Assessment / Plan     Assessment/Plan:   Diagnosis Plan   1. Mixed hyperlipidemia  atorvastatin (LIPITOR) 20 MG tablet      2. Degeneration of cervical intervertebral disc  oxyCODONE-acetaminophen (Percocet)  MG per tablet      3. Degenerative disc disease, cervical  oxyCODONE-acetaminophen (Percocet)  MG per tablet      4. Spinal stenosis in cervical region  oxyCODONE-acetaminophen (Percocet)  MG per tablet      5. Rheumatoid arthritis involving multiple sites with positive rheumatoid factor  oxyCODONE-acetaminophen (Percocet)  MG per tablet      6. Personal history of tobacco use, presenting hazards to health        7. Screening for lung cancer   CT Chest Low Dose Cancer Screening WO      8. Personal history of nicotine dependence   CT Chest Low Dose Cancer Screening WO            Return in about 4 weeks (around 1/16/2025) for Medicare Wellness. unless patient needs  to be seen sooner or acute issues arise.      I have discussed the patient results/orders and and plan/recommendation with them at today's visit.      Signed by:    Óscar Lehman MD Date: 12/19/24

## 2025-01-02 ENCOUNTER — HOSPITAL ENCOUNTER (OUTPATIENT)
Dept: MRI IMAGING | Facility: HOSPITAL | Age: 60
Discharge: HOME | End: 2025-01-02
Admitting: NEUROLOGICAL SURGERY
Payer: MEDICARE

## 2025-01-02 DIAGNOSIS — M54.50 CHRONIC BILATERAL LOW BACK PAIN WITHOUT SCIATICA: ICD-10-CM

## 2025-01-02 DIAGNOSIS — G89.29 CHRONIC BILATERAL LOW BACK PAIN WITHOUT SCIATICA: ICD-10-CM

## 2025-01-02 PROCEDURE — 72148 MRI LUMBAR SPINE W/O DYE: CPT

## 2025-01-14 DIAGNOSIS — E78.2 MIXED HYPERLIPIDEMIA: ICD-10-CM

## 2025-01-14 RX ORDER — ATORVASTATIN CALCIUM 20 MG/1
20 TABLET, FILM COATED ORAL DAILY
Qty: 30 TABLET | Refills: 0 | Status: SHIPPED | OUTPATIENT
Start: 2025-01-14 | End: 2025-01-16 | Stop reason: SDUPTHER

## 2025-01-14 NOTE — TELEPHONE ENCOUNTER
Caller: Lars Rand    Relationship: Self    Best call back number: [phone]     Requested Prescriptions:   Requested Prescriptions     Pending Prescriptions Disp Refills    atorvastatin (LIPITOR) 20 MG tablet 30 tablet 0     Sig: Take 1 tablet by mouth Daily.        Pharmacy where request should be sent:  [facility] [address] [phone] [facility] [phone]      Last office visit with prescribing clinician: 12/19/2024   Last telemedicine visit with prescribing clinician: Visit date not found   Next office visit with prescribing clinician: 1/16/2025     Additional details provided by patient: PATIENT SAID PHARMACY ONLY GAVE HIM 21 WHEN HE PICKED UP ON 12.19.24    Does the patient have less than a 3 day supply:  [x] Yes  [] No    Would you like a call back once the refill request has been completed: [x] Yes [] No    If the office needs to give you a call back, can they leave a voicemail: [] Yes [] No    David Encinas Rep   01/14/25 10:42 CST

## 2025-01-14 NOTE — TELEPHONE ENCOUNTER
Rx Refill Note  Requested Prescriptions     Pending Prescriptions Disp Refills    atorvastatin (LIPITOR) 20 MG tablet 30 tablet 0     Sig: Take 1 tablet by mouth Daily.      Last office visit with prescribing clinician: 12/19/2024   Last telemedicine visit with prescribing clinician: Visit date not found   Next office visit with prescribing clinician: 1/16/2025                         Would you like a call back once the refill request has been completed: [] Yes [] No    If the office needs to give you a call back, can they leave a voicemail: [] Yes [] No    Cassie Cordoba RN  01/14/25, 10:43 CST

## 2025-01-16 ENCOUNTER — OFFICE VISIT (OUTPATIENT)
Dept: INTERNAL MEDICINE | Facility: CLINIC | Age: 60
End: 2025-01-16
Payer: MEDICARE

## 2025-01-16 VITALS
WEIGHT: 187.8 LBS | TEMPERATURE: 98.4 F | DIASTOLIC BLOOD PRESSURE: 75 MMHG | HEART RATE: 77 BPM | OXYGEN SATURATION: 98 % | BODY MASS INDEX: 29.47 KG/M2 | SYSTOLIC BLOOD PRESSURE: 138 MMHG | HEIGHT: 67 IN

## 2025-01-16 DIAGNOSIS — E78.2 MIXED HYPERLIPIDEMIA: ICD-10-CM

## 2025-01-16 DIAGNOSIS — Z00.00 MEDICARE ANNUAL WELLNESS VISIT, SUBSEQUENT: Primary | ICD-10-CM

## 2025-01-16 PROCEDURE — G0439 PPPS, SUBSEQ VISIT: HCPCS | Performed by: FAMILY MEDICINE

## 2025-01-16 PROCEDURE — 99497 ADVNCD CARE PLAN 30 MIN: CPT | Performed by: FAMILY MEDICINE

## 2025-01-16 PROCEDURE — 3075F SYST BP GE 130 - 139MM HG: CPT | Performed by: FAMILY MEDICINE

## 2025-01-16 PROCEDURE — 1125F AMNT PAIN NOTED PAIN PRSNT: CPT | Performed by: FAMILY MEDICINE

## 2025-01-16 PROCEDURE — 1160F RVW MEDS BY RX/DR IN RCRD: CPT | Performed by: FAMILY MEDICINE

## 2025-01-16 PROCEDURE — 1170F FXNL STATUS ASSESSED: CPT | Performed by: FAMILY MEDICINE

## 2025-01-16 PROCEDURE — 3078F DIAST BP <80 MM HG: CPT | Performed by: FAMILY MEDICINE

## 2025-01-16 PROCEDURE — 1159F MED LIST DOCD IN RCRD: CPT | Performed by: FAMILY MEDICINE

## 2025-01-16 RX ORDER — ATORVASTATIN CALCIUM 20 MG/1
20 TABLET, FILM COATED ORAL DAILY
Qty: 90 TABLET | Refills: 3 | Status: SHIPPED | OUTPATIENT
Start: 2025-01-16

## 2025-01-16 NOTE — PATIENT INSTRUCTIONS
Advance Care Planning and Advance Directives     You make decisions on a daily basis - decisions about where you want to live, your career, your home, your life. Perhaps one of the most important decisions you face is your choice for future medical care. Take time to talk with your family and your healthcare team and start planning today.  Advance Care Planning is a process that can help you:  Understand possible future healthcare decisions in light of your own experiences  Reflect on those decision in light of your goals and values  Discuss your decisions with those closest to you and the healthcare professionals that care for you  Make a plan by creating a document that reflects your wishes    Surrogate Decision Maker  In the event of a medical emergency, which has left you unable to communicate or to make your own decisions, you would need someone to make decisions for you.  It is important to discuss your preferences for medical treatment with this person while you are in good health.     Qualities of a surrogate decision maker:  Willing to take on this role and responsibility  Knows what you want for future medical care  Willing to follow your wishes even if they don't agree with them  Able to make difficult medical decisions under stressful circumstances    Advance Directives  These are legal documents you can create that will guide your healthcare team and decision maker(s) when needed. These documents can be stored in the electronic medical record.    Living Will - a legal document to guide your care if you have a terminal condition or a serious illness and are unable to communicate. States vary by statute in document names/types, but most forms may include one or more of the following:        -  Directions regarding life-prolonging treatments        -  Directions regarding artificially provided nutrition/hydration        -  Choosing a healthcare decision maker        -  Direction regarding organ/tissue  donation    Durable Power of  for Healthcare - this document names an -in-fact to make medical decisions for you, but it may also allow this person to make personal and financial decisions for you. Please seek the advice of an  if you need this type of document.    **Advance Directives are not required and no one may discriminate against you if you do not sign one.    Medical Orders  Many states allow specific forms/orders signed by your physician to record your wishes for medical treatment in your current state of health. This form, signed in personal communication with your physician, addresses resuscitation and other medical interventions that you may or may not want.      For more information or to schedule a time with a [facility] Advance Care Planning Facilitator contact: [employer] or call [phone] and someone will contact you directly.    Medicare Wellness  Personal Prevention Plan of Service     Date of Office Visit:    Encounter Provider:  Óscar Lehman MD  Place of Service:  [facility]  Patient Name: Lars Rand  :  1965    As part of the Medicare Wellness portion of your visit today, we are providing you with this personalized preventive plan of services (PPPS). This plan is based upon recommendations of the [place] Preventive Services Task Force (USPSTF) and the Advisory Committee on Immunization Practices (ACIP).    This lists the preventive care services that should be considered, and provides dates of when you are due. Items listed as completed are up-to-date and do not require any further intervention.    Health Maintenance   Topic Date Due   • COVID-19 Vaccine (2024- season) 03/10/2025 (Originally 2024)   • INFLUENZA VACCINE  2025 (Originally 2024)   • TDAP/TD VACCINES (1 - Tdap) 10/31/2025 (Originally 1984)   • LUNG CANCER SCREENING  10/31/2025 (Originally 2015)   •  ZOSTER VACCINE (1 of 2) 12/15/2025 (Originally 6/6/2015)   • LIPID PANEL  09/04/2025   • BMI FOLLOWUP  12/17/2025   • ANNUAL WELLNESS VISIT  01/16/2026   • COLORECTAL CANCER SCREENING  10/18/2026   • HEPATITIS C SCREENING  Completed   • Pneumococcal Vaccine 0-64  Aged Out       No orders of the defined types were placed in this encounter.      Return in about 3 weeks (around 2/6/2025).

## 2025-01-16 NOTE — PROGRESS NOTES
Subjective   The ABCs of the Annual Wellness Visit  Medicare Wellness Visit      Lars Rand is a 59 y.o. patient who presents for a Medicare Wellness Visit.    The following portions of the patient's history were reviewed and   updated as appropriate: allergies, current medications, past family history, past medical history, past social history, past surgical history, and problem list.    Compared to one year ago, the patient's physical   health is better.  Compared to one year ago, the patient's mental   health is better.    Recent Hospitalizations:  He was admitted within the past 365 days at [facility].     Current Medical Providers:  Patient Care Team:  Óscar Lehman MD as PCP - General (Family Medicine)    Outpatient Medications Prior to Visit   Medication Sig Dispense Refill    atenolol (TENORMIN) 25 MG tablet Take 1 tablet by mouth Daily. 90 tablet 3    atorvastatin (LIPITOR) 20 MG tablet Take 1 tablet by mouth Daily. 30 tablet 0    cyclobenzaprine (FLEXERIL) 10 MG tablet Take 1 tablet by mouth 3 (Three) Times a Day As Needed for Muscle Spasms. 45 tablet 1    diclofenac (VOLTAREN) 75 MG EC tablet Take 1 tablet by mouth Every 12 (Twelve) Hours.      folic acid (FOLVITE) 1 MG tablet Take 1 tablet by mouth Daily. 90 tablet 3    hydroxychloroquine (PLAQUENIL) 200 MG tablet Take 2 tablets by mouth Daily. Indications: Rheumatoid Arthritis 180 tablet 3    naloxone (NARCAN) 4 MG/0.1ML nasal spray Call 911. Don't prime. [place] in 1 nostril for overdose. Repeat in 2-3 minutes in other nostril if no or minimal breathing/responsiveness. 2 each 0    oxyCODONE-acetaminophen (Percocet)  MG per tablet Take 1 tablet by mouth Every 8 (Eight) Hours As Needed for Moderate Pain. 90 tablet 0    sildenafil (VIAGRA) 100 MG tablet Take 1 tablet by mouth Daily As Needed for Erectile Dysfunction. 30 tablet 3    traZODone (DESYREL) 50 MG tablet Take 1 tablet by mouth Every Night. 30 tablet 11     No  facility-administered medications prior to visit.     Opioid medication/s are on active medication list.  and I have evaluated his active treatment plan and pain score trends (see table).  Vitals:    01/16/25 0804   PainSc:   4   PainLoc: Neck     I have reviewed the chart for potential of high risk medication and harmful drug interactions in the elderly.        Aspirin is not on active medication list.  Aspirin use is contraindicated for this patient due to: current NSAID therapy.  .    Patient Active Problem List   Diagnosis    Primary hypertension    Rheumatoid arthritis involving multiple sites with positive rheumatoid factor    Benign prostatic hyperplasia without lower urinary tract symptoms    Chronic neck pain    Mixed sleep apnea    Degenerative disc disease, cervical    Spinal stenosis in cervical region    Degeneration of cervical intervertebral disc    Cervical stenosis of spinal canal    Cervical spinal stenosis     Advance Care Planning Advance Directive is not on file.  ACP discussion was held with the patient during this visit. Most form filled out            Advanced Directives  I discussed with this patient the importance of advanced directives and planning ahead in the event that they are unable to make decisions on their own due to illness or incapacity.  We filled out a MOST form and discussed the different options including DNR, DNI, feeding tubes, Long term iv fluids and the use of antibiotics.  I discussed other options such as a health care surrogate, and or placing time limits on life saving measures should the need arise.  I discussed the importance of having nury discussions with their family/healthcare surrogate about their wishes so they are well known.   I spent 16 minutes discussing/counseling these issues with the patient and MOST form will be scanned into the chart, also advised the patient to keep a copy and also make sure local hospital services also have a copy on file.  Patient  "also advised if they have a copy to their healthcare surrogate if they have chosen one.  Lastly we also advised the patient that if they want to change their wishes at any time that a new MOST form can be drafted to accommodate any changes.     Objective   Vitals:    25 0804 25 0812   BP: 147/90 138/75   BP Location: Right arm Left arm   Patient Position: Sitting Sitting   Cuff Size: Adult Adult   Pulse: 77    Temp: 98.4 °F (36.9 °C)    TempSrc: Infrared    SpO2: 98%    Weight: 85.2 kg (187 lb 12.8 oz)    Height: 170.2 cm (67.01\")    PainSc:   4    PainLoc: Neck        Estimated body mass index is 29.41 kg/m² as calculated from the following:    Height as of this encounter: 170.2 cm (67.01\").    Weight as of this encounter: 85.2 kg (187 lb 12.8 oz).                Does the patient have evidence of cognitive impairment? No                                                                                               Health  Risk Assessment    Smoking Status:  Social History     Tobacco Use   Smoking Status Former    Current packs/day: 0.00    Average packs/day: 1 pack/day for 37.0 years (37.0 ttl pk-yrs)    Types: Cigarettes    Start date: 1977    Quit date:     Years since quittin.0    Passive exposure: Past   Smokeless Tobacco Never     Alcohol Consumption:  Social History     Substance and Sexual Activity   Alcohol Use Yes    Alcohol/week: 4.0 - 5.0 standard drinks of alcohol    Types: 4 - 5 Cans of beer per week    Comment: occ       Fall Risk Screen  STEADI Fall Risk Assessment was completed, and patient is at LOW risk for falls.Assessment completed on:2025    Depression Screening   Little interest or pleasure in doing things? Not at all   Feeling down, depressed, or hopeless? Not at all   PHQ-2 Total Score 0      Health Habits and Functional and Cognitive Screenin/16/2025     8:07 AM   Functional & Cognitive Status   Do you have difficulty preparing food and eating? No   Do " you have difficulty bathing yourself, getting dressed or grooming yourself? No   Do you have difficulty using the toilet? No   Do you have difficulty moving around from place to place? No   Do you have trouble with steps or getting out of a bed or a chair? No   Current Diet Unhealthy Diet   Dental Exam Up to date   Eye Exam Up to date   Exercise (times per week) 1 times per week   Current Exercises Include Treadmill;Walking   Do you need help using the phone?  No   Are you deaf or do you have serious difficulty hearing?  No   Do you need help to go to places out of walking distance? No   Do you need help shopping? No   Do you need help preparing meals?  No   Do you need help with housework?  No   Do you need help with laundry? No   Do you need help taking your medications? No   Do you need help managing money? No   Do you ever drive or ride in a car without wearing a seat belt? Yes   Have you felt unusual stress, anger or loneliness in the last month? No   Who do you live with? Spouse   If you need help, do you have trouble finding someone available to you? No   Have you been bothered in the last four weeks by sexual problems? No   Do you have difficulty concentrating, remembering or making decisions? No           Age-appropriate Screening Schedule:  Refer to the list below for future screening recommendations based on patient's age, sex and/or medical conditions. Orders for these recommended tests are listed in the plan section. The patient has been provided with a written plan.    Health Maintenance List  Health Maintenance   Topic Date Due    COVID-19 Vaccine (1 - 2024-25 season) 03/10/2025 (Originally 9/1/2024)    INFLUENZA VACCINE  03/31/2025 (Originally 7/1/2024)    TDAP/TD VACCINES (1 - Tdap) 10/31/2025 (Originally 6/6/1984)    LUNG CANCER SCREENING  10/31/2025 (Originally 6/6/2015)    ZOSTER VACCINE (1 of 2) 12/15/2025 (Originally 6/6/2015)    LIPID PANEL  09/04/2025    BMI FOLLOWUP  12/17/2025    ANNUAL  WELLNESS VISIT  01/16/2026    COLORECTAL CANCER SCREENING  10/18/2026    HEPATITIS C SCREENING  Completed    Pneumococcal Vaccine 0-64  Aged Out                                                                                                                                                CMS Preventative Services Quick Reference  Risk Factors Identified During Encounter  Chronic Pain: Natural history and expected course discussed. Questions answered.    The above risks/problems have been discussed with the patient.  Pertinent information has been shared with the patient in the After Visit Summary.  An After Visit Summary and PPPS were made available to the patient.    Follow Up:   Next Medicare Wellness visit to be scheduled in 1 year.     Assessment & Plan  Mixed hyperlipidemia   Lipid abnormalities are stable    Plan:  Continue same medication/s without change.      Discussed medication dosage, use, side effects, and goals of treatment in detail.    Counseled patient on lifestyle modifications to help control hyperlipidemia.     Patient Treatment Goals:   LDL goal is less than 70    Followup in 6 months.    Orders:    atorvastatin (LIPITOR) 20 MG tablet; Take 1 tablet by mouth Daily.    Medicare annual wellness visit, subsequent              Follow Up:   Return in about 3 weeks (around 2/6/2025).

## 2025-01-17 ENCOUNTER — HOSPITAL ENCOUNTER (OUTPATIENT)
Dept: CT IMAGING | Facility: HOSPITAL | Age: 60
Discharge: HOME | End: 2025-01-17
Payer: MEDICARE

## 2025-01-17 DIAGNOSIS — Z12.2 SCREENING FOR LUNG CANCER: ICD-10-CM

## 2025-01-17 DIAGNOSIS — Z87.891 PERSONAL HISTORY OF NICOTINE DEPENDENCE: ICD-10-CM

## 2025-01-17 PROCEDURE — 71271 CT THORAX LUNG CANCER SCR C-: CPT

## 2025-02-03 DIAGNOSIS — N52.9 ERECTILE DYSFUNCTION, UNSPECIFIED ERECTILE DYSFUNCTION TYPE: ICD-10-CM

## 2025-02-03 RX ORDER — SILDENAFIL 100 MG/1
100 TABLET, FILM COATED ORAL DAILY PRN
Qty: 30 TABLET | Refills: 3 | Status: SHIPPED | OUTPATIENT
Start: 2025-02-03

## 2025-02-06 ENCOUNTER — OFFICE VISIT (OUTPATIENT)
Dept: INTERNAL MEDICINE | Facility: CLINIC | Age: 60
End: 2025-02-06
Payer: MEDICARE

## 2025-02-06 VITALS
WEIGHT: 189 LBS | HEIGHT: 67 IN | SYSTOLIC BLOOD PRESSURE: 138 MMHG | TEMPERATURE: 98 F | BODY MASS INDEX: 29.66 KG/M2 | OXYGEN SATURATION: 96 % | HEART RATE: 69 BPM | DIASTOLIC BLOOD PRESSURE: 76 MMHG

## 2025-02-06 DIAGNOSIS — M48.02 SPINAL STENOSIS IN CERVICAL REGION: ICD-10-CM

## 2025-02-06 DIAGNOSIS — M50.30 DEGENERATIVE DISC DISEASE, CERVICAL: ICD-10-CM

## 2025-02-06 DIAGNOSIS — M05.79 RHEUMATOID ARTHRITIS INVOLVING MULTIPLE SITES WITH POSITIVE RHEUMATOID FACTOR: ICD-10-CM

## 2025-02-06 DIAGNOSIS — M50.30 DEGENERATION OF CERVICAL INTERVERTEBRAL DISC: ICD-10-CM

## 2025-02-06 PROCEDURE — 3078F DIAST BP <80 MM HG: CPT | Performed by: FAMILY MEDICINE

## 2025-02-06 PROCEDURE — 99213 OFFICE O/P EST LOW 20 MIN: CPT | Performed by: FAMILY MEDICINE

## 2025-02-06 PROCEDURE — 3075F SYST BP GE 130 - 139MM HG: CPT | Performed by: FAMILY MEDICINE

## 2025-02-06 PROCEDURE — 1125F AMNT PAIN NOTED PAIN PRSNT: CPT | Performed by: FAMILY MEDICINE

## 2025-02-06 RX ORDER — IBUPROFEN 800 MG/1
800 TABLET, FILM COATED ORAL EVERY 8 HOURS PRN
COMMUNITY
Start: 2025-02-04 | End: 2025-02-06 | Stop reason: SDUPTHER

## 2025-02-06 RX ORDER — IBUPROFEN 800 MG/1
800 TABLET, FILM COATED ORAL EVERY 8 HOURS PRN
Qty: 270 TABLET | Refills: 3 | Status: SHIPPED | OUTPATIENT
Start: 2025-02-06

## 2025-02-06 RX ORDER — AMOXICILLIN 500 MG/1
500 CAPSULE ORAL 3 TIMES DAILY
COMMUNITY
Start: 2025-02-04 | End: 2025-02-06

## 2025-02-06 RX ORDER — OXYCODONE AND ACETAMINOPHEN 10; 325 MG/1; MG/1
1 TABLET ORAL EVERY 8 HOURS PRN
Qty: 90 TABLET | Refills: 0 | Status: SHIPPED | OUTPATIENT
Start: 2025-02-06

## 2025-02-06 NOTE — PROGRESS NOTES
Subjective     Chief Complaint   Patient presents with    Neck Pain    Hyperlipidemia       Neck Pain     Hyperlipidemia        Patient's PMR from outside medical facility reviewed and noted.    Lars Rand is a 59 y.o. male who presents for a routine office visit.  Lars Rand returns for follow-up of chronic neck pain dates back to an accident which occurred in 2022 prior to that patient had also had a longstanding history of rheumatoid arthritis with multi joint painand had been following with Dr. Quijano.   his pain is stable, well controlled, and improved after surgery.  pain is stable and well controlled.    I have discussed with him the possibility of addiction, overdose, as well as the limited benefits and other risks of opioid use.  Patient reports a greater than 30% continuous improvement in functional capability on the 3 item PEG assessment scale since his initial assessment, his Edvin has been reviewed, and risk for diversion or abuse appears low.  The patient states that he has no side effects of his medication, does not appear oversedated, states that he is not modifying his own regimen, and that he is not taking any other illicit substances.  The patient has never had an overdose or needed a rescue medication we are aware of at this office.  A plan is in place discussed when initiating his narcotics for discontinuation should problems arise or pain improve.    Pain characteristics:  Current pain level : 6/10 as reported by patient.    Pain Description :  dull, ache, and throbbing. does radiate and occurs continuously.   Alleviating factors :   pain medication  Aggravating factors : bending, standing, and walking  Associated Symptoms:  patient denies any problems      PEG scale:  1. Pain on average over the Last week 6/10  2. Patient states that durring the past week that thier pain has interfered with his enjoyment of life 6/10  with 10 bieng complete interference.  3. The patient  also states that during the past week that that his pain has interfered with his general activity 6/10  with 10 bieng complete interference      Final Peg Score:6    Has been following with the dentist and has had recent    Current MME total: 45     Objective Radiological Findings:  Degenerative changes, most advanced at C5-6 and C6-7. On cervical spine xray, Possible partial fusion of the inferior sacroiliac joints on lumbar xray, positive RF     Trearment goals: reduced pain, Increased activities of daily living     History:              Duration of the patient's pain: since before 2010              Legal issues? NO              Current controlled medication and doses: percocet              Medications that have failed: nsaids, norco              Side effects of current medications: yes              Satisfaction with treatment: yes     Functional Status:              Able to feed self: YES              Able to bathe self: YES              Able to use the toilet independently: YES              Able to dress self: YES              Able to get up from bed or chair without assistance: yes     Adverse Events:              Constipation: no              Lethargy: NO                Aberrant Behavior:              Over dose: NO              Run out of medications early: NO              Last UDS consistent: yes              Taking medications as prescribed: YES        Past Medical History:   Past Medical History:   Diagnosis Date    Arthritis     Cervical disc disorder 01/02/2022    Erectile dysfunction 04/2023    GERD (gastroesophageal reflux disease)     Heart attack     Hypertension     Low back pain     Lumbosacral disc disease     Degenerative Disk Disease    Sleep apnea     Visual impairment 2022    Rosie-Parkinson-White (WPW) pattern      Past Surgical History:  Past Surgical History:   Procedure Laterality Date    ANTERIOR CERVICAL DISCECTOMY W/ FUSION N/A 09/25/2024    Procedure: CERVICAL DISCECTOMY ANTERIOR WITH  FUSION C4-5,5-6,6-7;  Surgeon: Cj Daley MD;  [facility]: [facility];  Service: Neurosurgery;  Laterality: N/A;    EPIDURAL BLOCK  02/2024    MULTIPLE TOOTH EXTRACTIONS      NECK SURGERY  9/25/2024    SPINE SURGERY  9/25/2024    Cervical spine    TONSILECTOMY, ADENOIDECTOMY, BILATERAL MYRINGOTOMY AND TUBES      TONSILLECTOMY  1971     Social History:  reports that he quit smoking about 11 years ago. His smoking use included cigarettes. He started smoking about 48 years ago. He has a 37 pack-year smoking history. He has been exposed to tobacco smoke. He has never used smokeless tobacco. He reports current alcohol use of about 4.0 - 5.0 standard drinks of alcohol per week. He reports that he does not use drugs.    Family History: family history includes Arthritis in his father and mother; Dementia in his mother; Diabetes in his father and mother; Heart failure in his mother; Hypertension in his brother and father; Leukemia in his father; Stroke in his mother; Thyroid cancer in his sister.      Allergies:  No Known Allergies  Medications:  Prior to Admission medications    Medication Sig Start Date End Date Taking? Authorizing Provider   atenolol (TENORMIN) 25 MG tablet Take 1 tablet by mouth Daily. 8/14/24  Yes Óscar Lehman MD   atorvastatin (LIPITOR) 20 MG tablet Take 1 tablet by mouth Daily. 1/16/25  Yes Óscar Lehman MD   cyclobenzaprine (FLEXERIL) 10 MG tablet Take 1 tablet by mouth 3 (Three) Times a Day As Needed for Muscle Spasms. 9/26/24  Yes Yang Granados APRN   folic acid (FOLVITE) 1 MG tablet Take 1 tablet by mouth Daily. 8/14/24  Yes Óscar Lehman MD   hydroxychloroquine (PLAQUENIL) 200 MG tablet Take 2 tablets by mouth Daily. Indications: Rheumatoid Arthritis 8/14/24  Yes Óscar Lehman MD   ibuprofen (ADVIL,MOTRIN) 800 MG tablet Take 1 tablet by mouth Every 8 (Eight) Hours As Needed. 2/4/25  Yes ProviderLindsay MD   naloxone (NARCAN) 4  "MG/0.1ML nasal spray Call 911. Don't prime. [place] in 1 nostril for overdose. Repeat in 2-3 minutes in other nostril if no or minimal breathing/responsiveness. 9/26/24  Yes Yang Granados APRN   oxyCODONE-acetaminophen (Percocet)  MG per tablet Take 1 tablet by mouth Every 8 (Eight) Hours As Needed for Moderate Pain. 12/19/24  Yes Óscar Lehman MD   sildenafil (VIAGRA) 100 MG tablet Take 1 tablet by mouth Daily As Needed for Erectile Dysfunction. 2/3/25  Yes Óscar Lehman MD   traZODone (DESYREL) 50 MG tablet Take 1 tablet by mouth Every Night. 8/14/24  Yes Óscar Lehman MD   amoxicillin (AMOXIL) 500 MG capsule Take 1 capsule by mouth 3 (Three) Times a Day. 2/4/25 2/6/25 Yes ProviderLindsay MD   diclofenac (VOLTAREN) 75 MG EC tablet Take 1 tablet by mouth Every 12 (Twelve) Hours. 12/10/24 2/6/25 Yes Provider, MD Lindsay           Review of systems   negative unless otherwise specified above in HPI    Objective     Vital Signs: /76 (BP Location: Left arm, Patient Position: Sitting, Cuff Size: Adult)   Pulse 69   Temp 98 °F (36.7 °C) (Infrared)   Ht 170.2 cm (67\")   Wt 85.7 kg (189 lb)   SpO2 96%   BMI 29.60 kg/m²     Physical Exam  Vitals and nursing note reviewed.   Constitutional:       General: He is not in acute distress.     Appearance: Normal appearance.   HENT:      Head: Normocephalic.   Eyes:      Extraocular Movements: Extraocular movements intact.      Pupils: Pupils are equal, round, and reactive to light.   Cardiovascular:      Rate and Rhythm: Normal rate and regular rhythm.      Heart sounds: Normal heart sounds. No murmur heard.  Pulmonary:      Effort: Pulmonary effort is normal. No respiratory distress.      Breath sounds: Normal breath sounds. No rhonchi or rales.   Abdominal:      General: Abdomen is flat. Bowel sounds are normal.      Palpations: Abdomen is soft.   Neurological:      General: No focal deficit present.      Mental " Status: He is alert.                Results Reviewed:  Glucose   Date Value Ref Range Status   09/26/2024 102 (H) 65 - 99 mg/dL Final     BUN   Date Value Ref Range Status   09/26/2024 13 6 - 20 mg/dL Final   06/17/2022 39 (H) 6 - 20 mg/dL Final     Creatinine   Date Value Ref Range Status   09/26/2024 0.98 0.76 - 1.27 mg/dL Final   06/17/2022 1.23 0.80 - 1.30 mg/dL Final     Sodium   Date Value Ref Range Status   09/26/2024 136 136 - 145 mmol/L Final   06/17/2022 134 (L) 136 - 144 mmol/L Final     Potassium   Date Value Ref Range Status   09/26/2024 4.2 3.5 - 5.2 mmol/L Final   06/17/2022 4.6 3.5 - 5.1 mmol/L Final     Chloride   Date Value Ref Range Status   09/26/2024 102 98 - 107 mmol/L Final   06/17/2022 105 100 - 110 mmol/L Final     CO2   Date Value Ref Range Status   09/26/2024 27.0 22.0 - 29.0 mmol/L Final     Calcium   Date Value Ref Range Status   09/26/2024 8.3 (L) 8.6 - 10.5 mg/dL Final   06/17/2022 8.6 (L) 8.9 - 10.3 mg/dL Final     ALT (SGPT)   Date Value Ref Range Status   09/04/2024 16 0 - 44 IU/L Final   06/16/2022 11 <=41 U/L Final     AST (SGOT)   Date Value Ref Range Status   09/04/2024 17 0 - 40 IU/L Final   06/16/2022 13 <=40 U/L Final     WBC   Date Value Ref Range Status   09/26/2024 7.89 3.40 - 10.80 10*3/mm3 Final   09/04/2024 6.2 3.4 - 10.8 x10E3/uL Final     Hematocrit   Date Value Ref Range Status   09/26/2024 34.4 (L) 37.5 - 51.0 % Final     Platelets   Date Value Ref Range Status   09/26/2024 175 140 - 450 10*3/mm3 Final     Triglycerides   Date Value Ref Range Status   09/04/2024 292 (H) 0 - 149 mg/dL Final     HDL Cholesterol   Date Value Ref Range Status   09/04/2024 46 >39 mg/dL Final     LDL Chol Calc (NIH)   Date Value Ref Range Status   09/04/2024 70 0 - 99 mg/dL Final     Hemoglobin A1C   Date Value Ref Range Status   09/04/2024 5.5 4.8 - 5.6 % Final     Comment:              Prediabetes: 5.7 - 6.4           Diabetes: >6.4           Glycemic control for adults with diabetes:  <7.0               Procedure   Procedures       Assessment / Plan     Assessment/Plan:   Diagnosis Plan   1. Degeneration of cervical intervertebral disc  oxyCODONE-acetaminophen (Percocet)  MG per tablet    ibuprofen (ADVIL,MOTRIN) 800 MG tablet      2. Degenerative disc disease, cervical  oxyCODONE-acetaminophen (Percocet)  MG per tablet    ibuprofen (ADVIL,MOTRIN) 800 MG tablet      3. Spinal stenosis in cervical region  oxyCODONE-acetaminophen (Percocet)  MG per tablet    ibuprofen (ADVIL,MOTRIN) 800 MG tablet      4. Rheumatoid arthritis involving multiple sites with positive rheumatoid factor  oxyCODONE-acetaminophen (Percocet)  MG per tablet    ibuprofen (ADVIL,MOTRIN) 800 MG tablet            Return in about 4 weeks (around 3/6/2025), or if symptoms worsen or fail to improve. unless patient needs to be seen sooner or acute issues arise.    As part of this patient's treatment plan, I am prescribing controlled substances. The patient has been made aware of appropriate use of such medications, including potential risk of somnolence, limited ability to drive and /or work safely, and potential for dependence or overdose. It has also been made clear that these medications are for use by this patient only, without concomitant use of alcohol or other substances unless prescribed.  Patient has been advised that PRN or as needed pain medication allows the patient to skip doses if not needed, but that the medication is not to be taken more often or at higher doses than prescribed.    Patient has completed prescribing agreement detailing terms of continued prescribing of controlled substances, including monitoring KEVIN reports, urine drug screening, and pill counts if necessary. The patient is aware that inappropriate use will result in cessation of prescribing such medications, and possible termination from this practice.    History and physical exam exhibit continued safe and appropriate use  of controlled substances.    1.  Controlled substance abuse agreement discussed and copy in record: YES  2.  Discussed:   Risk of addiction: YES   Specific risk of medications:YES   Side effects of medications: YES   Reasonable expectations of the medication: YES  3.  Treatment Objectives:   Discussed management of constipation: YES   Discussed management of other side effects: YES  4.  eKASPER:     KEVIN report has been reviewed by: Óscar Lehman MD on 02/06/25 in the PDMD in the electronic medical record.  [unfilled]   Results/Date of last KEVIN:  appropriate  5.  Results/Date of last drug screen:  appropriate  6.  Follow up plan:    Follow Up in One Months Time              Continue on current medications as directed    EMR Dictation/Transcription disclaimer:   Some of this note may be an electronic transcription/translation of spoken language to printed text. The electronic translation of spoken language may permit erroneous, or at times, nonsensical words or phrases to be inadvertently transcribed; Although I have reviewed the note for such errors, some may still exist.     I have discussed the patient results/orders and and plan/recommendation with them at today's visit.      Signed by:    Óscar Lehman MD Date: 02/06/25

## 2025-03-06 ENCOUNTER — OFFICE VISIT (OUTPATIENT)
Dept: INTERNAL MEDICINE | Facility: CLINIC | Age: 60
End: 2025-03-06
Payer: MEDICARE

## 2025-03-06 VITALS
OXYGEN SATURATION: 99 % | WEIGHT: 187 LBS | TEMPERATURE: 97.7 F | BODY MASS INDEX: 29.35 KG/M2 | HEIGHT: 67 IN | DIASTOLIC BLOOD PRESSURE: 80 MMHG | SYSTOLIC BLOOD PRESSURE: 132 MMHG | HEART RATE: 72 BPM | RESPIRATION RATE: 16 BRPM

## 2025-03-06 DIAGNOSIS — M05.79 RHEUMATOID ARTHRITIS INVOLVING MULTIPLE SITES WITH POSITIVE RHEUMATOID FACTOR: ICD-10-CM

## 2025-03-06 DIAGNOSIS — M50.30 DEGENERATIVE DISC DISEASE, CERVICAL: ICD-10-CM

## 2025-03-06 DIAGNOSIS — M50.30 DEGENERATION OF CERVICAL INTERVERTEBRAL DISC: ICD-10-CM

## 2025-03-06 DIAGNOSIS — M48.02 SPINAL STENOSIS IN CERVICAL REGION: ICD-10-CM

## 2025-03-06 DIAGNOSIS — Z23 NEED FOR PNEUMOCOCCAL VACCINATION: Primary | ICD-10-CM

## 2025-03-06 DIAGNOSIS — Z79.899 LONG-TERM USE OF HIGH-RISK MEDICATION: ICD-10-CM

## 2025-03-06 RX ORDER — OXYCODONE AND ACETAMINOPHEN 10; 325 MG/1; MG/1
1 TABLET ORAL EVERY 8 HOURS PRN
Qty: 90 TABLET | Refills: 0 | Status: SHIPPED | OUTPATIENT
Start: 2025-03-06

## 2025-03-06 RX ORDER — DICLOFENAC SODIUM 75 MG/1
1 TABLET, DELAYED RELEASE ORAL EVERY 12 HOURS SCHEDULED
COMMUNITY
Start: 2025-02-07

## 2025-03-06 NOTE — PROGRESS NOTES
Subjective     Chief Complaint   Patient presents with    Neck Pain    Back Pain     4 week follow up        History of Present Illness    Patient's PMR from outside medical facility reviewed and noted.    Lars Rand is a 59 y.o. male who presents for a routine office visit.  Lars Rand returns for follow-up of chronic neck pain dates back to an accident which occurred in 2022 prior to that patient had also had a longstanding history of rheumatoid arthritis with multi joint painand had been following with Dr. Quijano.   his pain is stable, well controlled, and improved after surgery.  pain is stable and well controlled.    I have discussed with him the possibility of addiction, overdose, as well as the limited benefits and other risks of opioid use.  Patient reports a greater than 30% continuous improvement in functional capability on the 3 item PEG assessment scale since his initial assessment, his Edvin has been reviewed, and risk for diversion or abuse appears low.  The patient states that he has no side effects of his medication, does not appear oversedated, states that he is not modifying his own regimen, and that he is not taking any other illicit substances.  The patient has never had an overdose or needed a rescue medication we are aware of at this office.  A plan is in place discussed when initiating his narcotics for discontinuation should problems arise or pain improve.    Pain characteristics:  Current pain level : 3/10 as reported by patient.    Pain Description :  dull and ache. does radiate and occurs continuously.   Alleviating factors :   pain medication  Aggravating factors : increased activity  Associated Symptoms:  patient denies any problems    Limitations: This pain limits the patient from minimally interferes with activities of daily living    PEG scale:  1. Pain on average over the Last week 3/10  2. Patient states that durring the past week that thier pain has  interfered with his enjoyment of life 3/10  with 10 bieng complete interference.  3. The patient also states that during the past week that that his pain has interfered with his general activity 3/10  with 10 bieng complete interference      Final Peg Score: 3    Will be following with dr. Daley to discuss his neck.    No other problems, complaints. or concerns noted.     Current MME total: 45     Objective Radiological Findings:  Degenerative changes, most advanced at C5-6 and C6-7. On cervical spine xray, Possible partial fusion of the inferior sacroiliac joints on lumbar xray, positive RF     Trearment goals: reduced pain, Increased activities of daily living     History:              Duration of the patient's pain: since before 2010              Legal issues? NO              Current controlled medication and doses: percocet              Medications that have failed: nsaids, norco              Side effects of current medications: yes              Satisfaction with treatment: yes     Functional Status:              Able to feed self: YES              Able to bathe self: YES              Able to use the toilet independently: YES              Able to dress self: YES              Able to get up from bed or chair without assistance: yes     Adverse Events:              Constipation: no              Lethargy: NO                Aberrant Behavior:              Over dose: NO              Run out of medications early: NO              Last UDS consistent: yes              Taking medications as prescribed: YES        Past Medical History:   Past Medical History:   Diagnosis Date    Arthritis     Cervical disc disorder 01/02/2022    Erectile dysfunction 04/2023    GERD (gastroesophageal reflux disease)     Heart attack     Hypertension     Low back pain     Lumbosacral disc disease     Degenerative Disk Disease    Sleep apnea     Visual impairment 2022    Rosie-Parkinson-White (WPW) pattern      Past Surgical History:  Past  Surgical History:   Procedure Laterality Date    ANTERIOR CERVICAL DISCECTOMY W/ FUSION N/A 09/25/2024    Procedure: CERVICAL DISCECTOMY ANTERIOR WITH FUSION C4-5,5-6,6-7;  Surgeon: Cj Daley MD;  Location: [facility];  Service: Neurosurgery;  Laterality: N/A;    EPIDURAL BLOCK  02/2024    MULTIPLE TOOTH EXTRACTIONS      NECK SURGERY  9/25/2024    SPINE SURGERY  9/25/2024    Cervical spine    TONSILECTOMY, ADENOIDECTOMY, BILATERAL MYRINGOTOMY AND TUBES      TONSILLECTOMY  1971     Social History:  reports that he quit smoking about 11 years ago. His smoking use included cigarettes. He started smoking about 48 years ago. He has a 37 pack-year smoking history. He has been exposed to tobacco smoke. He has never used smokeless tobacco. He reports current alcohol use of about 4.0 - 5.0 standard drinks of alcohol per week. He reports that he does not use drugs.    Family History: family history includes Arthritis in his father and mother; Dementia in his mother; Diabetes in his father and mother; Heart failure in his mother; Hypertension in his brother and father; Leukemia in his father; Stroke in his mother; Thyroid cancer in his sister.      Allergies:  No Known Allergies  Medications:  Prior to Admission medications    Medication Sig Start Date End Date Taking? Authorizing Provider   atenolol (TENORMIN) 25 MG tablet Take 1 tablet by mouth Daily. 8/14/24  Yes Óscar Lehman MD   atorvastatin (LIPITOR) 20 MG tablet Take 1 tablet by mouth Daily. 1/16/25  Yes Óscar Lehman MD   cyclobenzaprine (FLEXERIL) 10 MG tablet Take 1 tablet by mouth 3 (Three) Times a Day As Needed for Muscle Spasms. 9/26/24  Yes Yang Granados APRN   folic acid (FOLVITE) 1 MG tablet Take 1 tablet by mouth Daily. 8/14/24  Yes Óscar Lehman MD   hydroxychloroquine (PLAQUENIL) 200 MG tablet Take 2 tablets by mouth Daily. Indications: Rheumatoid Arthritis 8/14/24  Yes Óscar Lehman MD  "  ibuprofen (ADVIL,MOTRIN) 800 MG tablet Take 1 tablet by mouth Every 8 (Eight) Hours As Needed. 2/4/25  Yes Lindsay Melgar MD   naloxone (NARCAN) 4 MG/0.1ML nasal spray Call 911. Don't prime. [place] in 1 nostril for overdose. Repeat in 2-3 minutes in other nostril if no or minimal breathing/responsiveness. 9/26/24  Yes Yang Granados APRN   oxyCODONE-acetaminophen (Percocet)  MG per tablet Take 1 tablet by mouth Every 8 (Eight) Hours As Needed for Moderate Pain. 12/19/24  Yes Óscar Lehman MD   sildenafil (VIAGRA) 100 MG tablet Take 1 tablet by mouth Daily As Needed for Erectile Dysfunction. 2/3/25  Yes Óscar Lehman MD   traZODone (DESYREL) 50 MG tablet Take 1 tablet by mouth Every Night. 8/14/24  Yes Óscar Lehman MD   amoxicillin (AMOXIL) 500 MG capsule Take 1 capsule by mouth 3 (Three) Times a Day. 2/4/25 2/6/25 Yes Lindsay Melgar MD   diclofenac (VOLTAREN) 75 MG EC tablet Take 1 tablet by mouth Every 12 (Twelve) Hours. 12/10/24 2/6/25 Yes ProviderLindsay MD           Review of systems   negative unless otherwise specified above in HPI    Objective     Vital Signs: /80   Pulse 72   Temp 97.7 °F (36.5 °C)   Resp 16   Ht 170.2 cm (67\")   Wt 84.8 kg (187 lb)   SpO2 99%   BMI 29.29 kg/m²     Physical Exam  Vitals and nursing note reviewed.   Constitutional:       General: He is not in acute distress.     Appearance: Normal appearance.   HENT:      Head: Normocephalic.   Eyes:      Extraocular Movements: Extraocular movements intact.      Pupils: Pupils are equal, round, and reactive to light.   Cardiovascular:      Rate and Rhythm: Normal rate and regular rhythm.      Heart sounds: Normal heart sounds. No murmur heard.  Pulmonary:      Effort: Pulmonary effort is normal. No respiratory distress.      Breath sounds: Normal breath sounds. No rhonchi or rales.   Abdominal:      General: Abdomen is flat. Bowel sounds are normal.      " Palpations: Abdomen is soft.   Neurological:      General: No focal deficit present.      Mental Status: He is alert.                Results Reviewed:  Glucose   Date Value Ref Range Status   09/26/2024 102 (H) 65 - 99 mg/dL Final     BUN   Date Value Ref Range Status   09/26/2024 13 6 - 20 mg/dL Final   06/17/2022 39 (H) 6 - 20 mg/dL Final     Creatinine   Date Value Ref Range Status   09/26/2024 0.98 0.76 - 1.27 mg/dL Final   06/17/2022 1.23 0.80 - 1.30 mg/dL Final     Sodium   Date Value Ref Range Status   09/26/2024 136 136 - 145 mmol/L Final   06/17/2022 134 (L) 136 - 144 mmol/L Final     Potassium   Date Value Ref Range Status   09/26/2024 4.2 3.5 - 5.2 mmol/L Final   06/17/2022 4.6 3.5 - 5.1 mmol/L Final     Chloride   Date Value Ref Range Status   09/26/2024 102 98 - 107 mmol/L Final   06/17/2022 105 100 - 110 mmol/L Final     CO2   Date Value Ref Range Status   09/26/2024 27.0 22.0 - 29.0 mmol/L Final     Calcium   Date Value Ref Range Status   09/26/2024 8.3 (L) 8.6 - 10.5 mg/dL Final   06/17/2022 8.6 (L) 8.9 - 10.3 mg/dL Final     ALT (SGPT)   Date Value Ref Range Status   09/04/2024 16 0 - 44 IU/L Final   06/16/2022 11 <=41 U/L Final     AST (SGOT)   Date Value Ref Range Status   09/04/2024 17 0 - 40 IU/L Final   06/16/2022 13 <=40 U/L Final     WBC   Date Value Ref Range Status   09/26/2024 7.89 3.40 - 10.80 10*3/mm3 Final   09/04/2024 6.2 3.4 - 10.8 x10E3/uL Final     Hematocrit   Date Value Ref Range Status   09/26/2024 34.4 (L) 37.5 - 51.0 % Final     Platelets   Date Value Ref Range Status   09/26/2024 175 140 - 450 10*3/mm3 Final     Triglycerides   Date Value Ref Range Status   09/04/2024 292 (H) 0 - 149 mg/dL Final     HDL Cholesterol   Date Value Ref Range Status   09/04/2024 46 >39 mg/dL Final     LDL Chol Calc (NIH)   Date Value Ref Range Status   09/04/2024 70 0 - 99 mg/dL Final     Hemoglobin A1C   Date Value Ref Range Status   09/04/2024 5.5 4.8 - 5.6 % Final     Comment:               Prediabetes: 5.7 - 6.4           Diabetes: >6.4           Glycemic control for adults with diabetes: <7.0               Procedure   Procedures       Assessment / Plan     Assessment/Plan:   Diagnosis Plan   1. Need for pneumococcal vaccination        2. Degeneration of cervical intervertebral disc        3. Degenerative disc disease, cervical        4. Spinal stenosis in cervical region        5. Rheumatoid arthritis involving multiple sites with positive rheumatoid factor        6. Long-term use of high-risk medication              No follow-ups on file. unless patient needs to be seen sooner or acute issues arise.    As part of this patient's treatment plan, I am prescribing controlled substances. The patient has been made aware of appropriate use of such medications, including potential risk of somnolence, limited ability to drive and /or work safely, and potential for dependence or overdose. It has also been made clear that these medications are for use by this patient only, without concomitant use of alcohol or other substances unless prescribed.  Patient has been advised that PRN or as needed pain medication allows the patient to skip doses if not needed, but that the medication is not to be taken more often or at higher doses than prescribed.    Patient has completed prescribing agreement detailing terms of continued prescribing of controlled substances, including monitoring KEVIN reports, urine drug screening, and pill counts if necessary. The patient is aware that inappropriate use will result in cessation of prescribing such medications, and possible termination from this practice.    History and physical exam exhibit continued safe and appropriate use of controlled substances.    1.  Controlled substance abuse agreement discussed and copy in record: YES  2.  Discussed:   Risk of addiction: YES   Specific risk of medications:YES   Side effects of medications: YES   Reasonable expectations of the  medication: YES  3.  Treatment Objectives:   Discussed management of constipation: YES   Discussed management of other side effects: YES  4.  eKASPER:     KEVIN report has been reviewed by: Óscar Lehman MD on 03/06/25 in the PDMD in the electronic medical record.  [unfilled]   Results/Date of last KEVIN:  appropriate  5.  Results/Date of last drug screen:  appropriate  6.  Follow up plan:    Follow Up in One Months Time              Continue on current medications as directed    EMR Dictation/Transcription disclaimer:   Some of this note may be an electronic transcription/translation of spoken language to printed text. The electronic translation of spoken language may permit erroneous, or at times, nonsensical words or phrases to be inadvertently transcribed; Although I have reviewed the note for such errors, some may still exist.     I have discussed the patient results/orders and and plan/recommendation with them at today's visit.      Signed by:    Óscar Lehman MD Date: 03/06/25

## 2025-03-07 ENCOUNTER — HOSPITAL ENCOUNTER (OUTPATIENT)
Dept: GENERAL RADIOLOGY | Facility: HOSPITAL | Age: 60
Discharge: HOME | End: 2025-03-07
Payer: MEDICARE

## 2025-03-07 ENCOUNTER — OFFICE VISIT (OUTPATIENT)
Dept: NEUROSURGERY | Facility: CLINIC | Age: 60
End: 2025-03-07
Payer: MEDICARE

## 2025-03-07 VITALS — HEIGHT: 67 IN | WEIGHT: 186 LBS | BODY MASS INDEX: 29.19 KG/M2

## 2025-03-07 DIAGNOSIS — M54.50 CHRONIC BILATERAL LOW BACK PAIN WITHOUT SCIATICA: ICD-10-CM

## 2025-03-07 DIAGNOSIS — G89.29 CHRONIC BILATERAL LOW BACK PAIN WITHOUT SCIATICA: ICD-10-CM

## 2025-03-07 DIAGNOSIS — M41.85 OTHER FORM OF SCOLIOSIS OF THORACOLUMBAR SPINE: ICD-10-CM

## 2025-03-07 DIAGNOSIS — M48.02 SPINAL STENOSIS IN CERVICAL REGION: Primary | ICD-10-CM

## 2025-03-07 DIAGNOSIS — M48.062 SPINAL STENOSIS, LUMBAR REGION, WITH NEUROGENIC CLAUDICATION: ICD-10-CM

## 2025-03-07 DIAGNOSIS — M51.372 DEGENERATION OF INTERVERTEBRAL DISC OF LUMBOSACRAL REGION WITH DISCOGENIC BACK PAIN AND LOWER EXTREMITY PAIN: ICD-10-CM

## 2025-03-07 DIAGNOSIS — E66.3 OVERWEIGHT WITH BODY MASS INDEX (BMI) OF 29 TO 29.9 IN ADULT: ICD-10-CM

## 2025-03-07 DIAGNOSIS — M50.30 DEGENERATION OF CERVICAL INTERVERTEBRAL DISC: ICD-10-CM

## 2025-03-07 PROCEDURE — 72082 X-RAY EXAM ENTIRE SPI 2/3 VW: CPT

## 2025-03-07 PROCEDURE — 1159F MED LIST DOCD IN RCRD: CPT | Performed by: NURSE PRACTITIONER

## 2025-03-07 PROCEDURE — 1160F RVW MEDS BY RX/DR IN RCRD: CPT | Performed by: NURSE PRACTITIONER

## 2025-03-07 PROCEDURE — 99213 OFFICE O/P EST LOW 20 MIN: CPT | Performed by: NURSE PRACTITIONER

## 2025-03-07 NOTE — PATIENT INSTRUCTIONS
"DASH Eating Plan  DASH stands for Dietary Approaches to Stop Hypertension. The DASH eating plan is a healthy eating plan that has been shown to:  Lower high blood pressure (hypertension).  Reduce your risk for type 2 diabetes, heart disease, and stroke.  Help with weight loss.  What are tips for following this plan?  Reading food labels  Check food labels for the amount of salt (sodium) per serving. Choose foods with less than 5 percent of the Daily Value (DV) of sodium. In general, foods with less than 300 milligrams (mg) of sodium per serving fit into this eating plan.  To find whole grains, look for the word \"whole\" as the first word in the ingredient list.  Shopping  Buy products labeled as \"low-sodium\" or \"no salt added.\"  Buy fresh foods. Avoid canned foods and pre-made or frozen meals.  Cooking  Try not to add salt when you cook. Use salt-free seasonings or herbs instead of table salt or sea salt. Check with your health care provider or pharmacist before using salt substitutes.  Do not vicente foods. Cook foods in healthy ways, such as baking, boiling, grilling, roasting, or broiling.  Cook using oils that are good for your heart. These include olive, canola, avocado, soybean, and sunflower oil.  Meal planning    Eat a balanced diet. This should include:  4 or more servings of fruits and 4 or more servings of vegetables each day. Try to fill half of your plate with fruits and vegetables.  6-8 servings of whole grains each day.  6 or less servings of lean meat, poultry, or fish each day. 1 oz is 1 serving. A 3 oz (85 g) serving of meat is about the same size as the palm of your hand. One egg is 1 oz (28 g).  2-3 servings of low-fat dairy each day. One serving is 1 cup (237 mL).  1 serving of nuts, seeds, or beans 5 times each week.  2-3 servings of heart-healthy fats. Healthy fats called omega-3 fatty acids are found in foods such as walnuts, flaxseeds, fortified milks, and eggs. These fats are also found in " cold-water fish, such as sardines, salmon, and mackerel.  Limit how much you eat of:  Canned or prepackaged foods.  Food that is high in trans fat, such as fried foods.  Food that is high in saturated fat, such as fatty meat.  Desserts and other sweets, sugary drinks, and other foods with added sugar.  Full-fat dairy products.  Do not salt foods before eating.  Do not eat more than 4 egg yolks a week.  Try to eat at least 2 vegetarian meals a week.  Eat more home-cooked food and less restaurant, buffet, and fast food.  Lifestyle  When eating at a restaurant, ask if your food can be made with less salt or no salt.  If you drink alcohol:  Limit how much you have to:  0-1 drink a day if you are female.  0-2 drinks a day if you are male.  Know how much alcohol is in your drink. In the [place] one drink is one 12 oz bottle of beer (355 mL), one 5 oz glass of wine (148 mL), or one 1½ oz glass of hard liquor (44 mL).  General information  Avoid eating more than 2,300 mg of salt a day. If you have hypertension, you may need to reduce your sodium intake to 1,500 mg a day.  Work with your provider to stay at a healthy body weight or lose weight. Ask what the best weight range is for you.  On most days of the week, get at least 30 minutes of exercise that causes your heart to beat faster. This may include walking, swimming, or biking.  Work with your provider or dietitian to adjust your eating plan to meet your specific calorie needs.  What foods should I eat?  Fruits  All fresh, dried, or frozen fruit. Canned fruits that are in their natural juice and do not have sugar added to them.  Vegetables  Fresh or frozen vegetables that are raw, steamed, roasted, or grilled. Low-sodium or reduced-sodium tomato and vegetable juice. Low-sodium or reduced-sodium tomato sauce and tomato paste. Low-sodium or reduced-sodium canned vegetables.  Grains  Whole-grain or whole-wheat bread. Whole-grain or whole-wheat pasta. Brown rice. Oatmeal.  Quinoa. Bulgur. Whole-grain and low-sodium cereals. Ciara bread. Low-fat, low-sodium crackers. Whole-wheat flour tortillas.  Meats and other proteins  Skinless chicken or turkey. Ground chicken or turkey. Pork with fat trimmed off. Fish and seafood. Egg whites. Dried beans, peas, or lentils. Unsalted nuts, nut butters, and seeds. Unsalted canned beans. Lean cuts of beef with fat trimmed off. Low-sodium, lean precooked or cured meat, such as sausages or meat loaves.  Dairy  Low-fat (1%) or fat-free (skim) milk. Reduced-fat, low-fat, or fat-free cheeses. Nonfat, low-sodium ricotta or cottage cheese. Low-fat or nonfat yogurt. Low-fat, low-sodium cheese.  Fats and oils  Soft margarine without trans fats. Vegetable oil. Reduced-fat, low-fat, or light mayonnaise and salad dressings (reduced-sodium). Canola, safflower, olive, avocado, soybean, and sunflower oils. Avocado.  Seasonings and condiments  Herbs. Spices. Seasoning mixes without salt.  Other foods  Unsalted popcorn and pretzels. Fat-free sweets.  The items listed above may not be all the foods and drinks you can have. Talk to a dietitian to learn more.  What foods should I avoid?  Fruits  Canned fruit in a light or heavy syrup. Fried fruit. Fruit in cream or butter sauce.  Vegetables  Creamed or fried vegetables. Vegetables in a cheese sauce. Regular canned vegetables that are not marked as low-sodium or reduced-sodium. Regular canned tomato sauce and paste that are not marked as low-sodium or reduced-sodium. Regular tomato and vegetable juices that are not marked as low-sodium or reduced-sodium. Pickles. Olives.  Grains  Baked goods made with fat, such as croissants, muffins, or some breads. Dry pasta or rice meal packs.  Meats and other proteins  Fatty cuts of meat. Ribs. Fried meat. Rodriguez. Bologna, salami, and other precooked or cured meats, such as sausages or meat loaves, that are not lean and low in sodium. Fat from the back of a pig (fatback). Raquel.  Salted nuts and seeds. Canned beans with added salt. Canned or smoked fish. Whole eggs or egg yolks. Chicken or turkey with skin.  Dairy  Whole or 2% milk, cream, and half-and-half. Whole or full-fat cream cheese. Whole-fat or sweetened yogurt. Full-fat cheese. Nondairy creamers. Whipped toppings. Processed cheese and cheese spreads.  Fats and oils  Butter. Stick margarine. Lard. Shortening. Ghee. Rodriguez fat. Tropical oils, such as coconut, palm kernel, or palm oil.  Seasonings and condiments  Onion salt, garlic salt, seasoned salt, table salt, and sea salt. Worcestershire sauce. Tartar sauce. Barbecue sauce. Teriyaki sauce. Soy sauce, including reduced-sodium soy sauce. Steak sauce. Canned and packaged gravies. Fish sauce. Oyster sauce. Cocktail sauce. Store-bought horseradish. Ketchup. Mustard. Meat flavorings and tenderizers. Bouillon cubes. Hot sauces. Pre-made or packaged marinades. Pre-made or packaged taco seasonings. Relishes. Regular salad dressings.  Other foods  Salted popcorn and pretzels.  The items listed above may not be all the foods and drinks you should avoid. Talk to a dietitian to learn more.  Where to find more information  National Heart, Lung, and Blood [place] (NHLBI): nhlbi.nih.gov  American Heart Association (AHA): heart.org  Academy of Nutrition and Dietetics: eatright.org  National Kidney Foundation (NKF): kidney.org  This information is not intended to replace advice given to you by your health care provider. Make sure you discuss any questions you have with your health care provider.  Document Revised: 01/04/2024 Document Reviewed: 01/04/2024  Elsevier Patient Education © 2024 [employer]BMI for Adults  Body mass index (BMI) is a number found using a person's weight and height. BMI can help tell how much of a person's weight is made up of fat. BMI does not measure body fat directly. It is used instead of tests that directly measure body fat, which can be difficult and  "expensive.  What are BMI measurements used for?  BMI is useful to:  Find out if your weight puts you at higher risk for medical problems.  Help recommend changes, such as in diet and exercise. This can help you reach a healthy weight. BMI screening can be done again to see if these changes are working.  How is BMI calculated?  Your height and weight are measured. The BMI is found from those numbers. This can be done with [place] or metric measurements. Note that charts and online BMI calculators are available to help you find your BMI quickly and easily without doing these calculations.  To calculate your BMI in [place] measurements:  Measure your weight in pounds (lb).  Multiply the number of pounds by 703.  So, for an adult who weighs 150 lb, multiply that number by 703: 150 x 703, which equals 105,450.  Measure your height in inches. Then multiply that number by itself to get a measurement called \"inches squared.\"  So, for an adult who is 70 inches tall, the \"inches squared\" measurement is 70 inches x 70 inches, which equals 4,900 inches squared.  Divide the total from step 2 (number of lb x 703) by the total from step 3 (inches squared): 105,450 ÷ 4,900 = 21.5. This is your BMI.  To calculate your BMI in metric measurements:    Measure your weight in kilograms (kg).  For this example, the weight is 70 kg.  Measure your height in meters (m). Then multiply that number by itself to get a measurement called \"meters squared.\"  So, for an adult who is 1.75 m tall, the \"meters squared\" measurement is 1.75 m x 1.75 m, which equals 3.1 meters squared.  Divide the number of kilograms (your weight) by the meters squared number. In this example: 70 ÷ 3.1 = 22.6. This is your BMI.  What do the results mean?  BMI charts are used to see if you are underweight, normal weight, overweight, or obese. The following guidelines will be used:  Underweight: BMI less than 18.5.  Normal weight: BMI between 18.5 and 24.9.  Overweight: BMI " between 25 and 29.9.  Obese: BMI of 30 or above.  BMI is a tool and cannot diagnose a condition. Talk with your health care provider about what your BMI means for you. Keep these notes in mind:  Weight includes fat and muscle. Someone with a muscular build, such as an athlete, may have a BMI that is higher than 24.9. In cases like these, BMI is not a correct measure of body fat.  If you have a BMI of 25 or higher, your provider may need to do more testing to find out if excess body fat is the cause.  BMI is measured the same way for males and females. Females usually have more body fat than males of the same height and weight.  Where to find more information  For more information about BMI, including tools to quickly find your BMI, go to:  Centers for Disease Control and Prevention: cdc.gov  American Heart Association: heart.org  National Heart, Lung, and Blood [place]: nhlbi.nih.gov  This information is not intended to replace advice given to you by your health care provider. Make sure you discuss any questions you have with your health care provider.  Document Revised: 09/07/2023 Document Reviewed: 08/31/2023  Elsevier Patient Education © 2024 [employer] Inc.

## 2025-03-07 NOTE — PROGRESS NOTES
Chief complaint:   Chief Complaint   Patient presents with    Back Pain     Pt is here for imaging results. Pt states since his last office visit his symptoms have not improved.       Subjective     HPI: This is a 59-year-old gentleman who went to the operating on September 25, 2024 for a C3-7 ACDF.  He was last seen in December 2024 and at that time he was not complaining of any neck pain.  Did have some shoulder pain with activities but overall felt like his symptoms were improving.  Patient was also complaining of low back pain.  The pain in his back is constant.  It was worse with activities and with resting.  He was not complaining of any lower extremity pain or numbness and tingling.  We did send him for x-rays and MRI of the lumbar spine.  Here in follow-up today.  The patient is continue to complain of back pain.  It is more prominent with bending and lifting.  He denies any lower extremity pain or numbness and tingling.  He did have his imaging completed and is here for follow-up.    Review of Systems   Constitutional:  Positive for activity change.   Musculoskeletal:  Positive for arthralgias, back pain and myalgias.   Neurological: Negative.    All other systems reviewed and are negative.       Past Medical History:   Diagnosis Date    Arthritis     Cervical disc disorder 01/02/2022    Erectile dysfunction 04/2023    GERD (gastroesophageal reflux disease)     Heart attack     Hyperlipidemia     Hypertension     Low back pain     Lumbosacral disc disease     Degenerative Disk Disease    Sleep apnea     Visual impairment 2022    Rosie-Parkinson-White (WPW) pattern      Past Surgical History:   Procedure Laterality Date    ANTERIOR CERVICAL DISCECTOMY W/ FUSION N/A 09/25/2024    Procedure: CERVICAL DISCECTOMY ANTERIOR WITH FUSION C4-5,5-6,6-7;  Surgeon: Cj Daley MD;  Location: [facility];  Service: Neurosurgery;  Laterality: N/A;    EPIDURAL BLOCK  02/2024    MULTIPLE TOOTH EXTRACTIONS      NECK  "SURGERY  2024    SPINE SURGERY  2024    Cervical spine    TONSILECTOMY, ADENOIDECTOMY, BILATERAL MYRINGOTOMY AND TUBES      TONSILLECTOMY  1971     Family History   Problem Relation Age of Onset    Diabetes Mother     Dementia Mother     Stroke Mother     Arthritis Mother     Heart failure Mother     Hypertension Father     Diabetes Father     Leukemia Father     Arthritis Father     Thyroid cancer Sister     Hypertension Brother      Social History     Tobacco Use    Smoking status: Former     Current packs/day: 0.00     Average packs/day: 1 pack/day for 37.0 years (37.0 ttl pk-yrs)     Types: Cigarettes     Start date: 1977     Quit date:      Years since quittin.1     Passive exposure: Past    Smokeless tobacco: Never   Vaping Use    Vaping status: Never Used   Substance Use Topics    Alcohol use: Yes     Alcohol/week: 4.0 - 5.0 standard drinks of alcohol     Types: 4 - 5 Cans of beer per week     Comment: occ    Drug use: Never     (Not in a hospital admission)    Allergies:  Patient has no known allergies.    Objective      Vital Signs  Ht 170.2 cm (67\")   Wt 84.4 kg (186 lb)   BMI 29.13 kg/m²     Physical Exam  Constitutional:       General: He is awake.      Appearance: Normal appearance. He is well-developed.   HENT:      Head: Normocephalic.   Eyes:      General: Lids are normal.      Extraocular Movements: Extraocular movements intact.      Conjunctiva/sclera: Conjunctivae normal.      Pupils: Pupils are equal, round, and reactive to light.   Pulmonary:      Effort: Pulmonary effort is normal.      Breath sounds: Normal breath sounds.   Musculoskeletal:         General: Normal range of motion.      Cervical back: Normal range of motion.   Skin:     General: Skin is warm.   Neurological:      Mental Status: He is alert and oriented to person, place, and time.      GCS: GCS eye subscore is 4. GCS verbal subscore is 5. GCS motor subscore is 6.      Cranial Nerves: No cranial nerve " deficit.      Sensory: No sensory deficit.      Motor: Motor strength is normal.     Deep Tendon Reflexes: Reflexes are normal and symmetric. Reflexes normal.   Psychiatric:         Speech: Speech normal.         Behavior: Behavior normal.         Thought Content: Thought content normal.         Neurological Exam  Mental Status  Awake and alert. Oriented to person, place and time. Oriented to person, place, and time. Speech is normal. Language is fluent with no aphasia. Attention and concentration are normal.    Cranial Nerves  CN I: Sense of smell is normal.  CN II: Right normal visual field. Left normal visual field.  CN III, IV, VI: Extraocular movements intact bilaterally. Normal lids and orbits bilaterally. Pupils equal round and reactive to light bilaterally.  CN V: Facial sensation is normal.  CN VII:  Right: There is no facial weakness.  Left: There is no facial weakness.  CN XI: Shoulder shrug strength is normal.  CN XII: Tongue midline without atrophy or fasciculations.    Motor  Normal muscle bulk throughout. Normal muscle tone. Strength is 5/5 throughout all four extremities.    Sensory  Sensation is intact to light touch, pinprick, vibration and proprioception in all four extremities.    Reflexes  Deep tendon reflexes are 2+ and symmetric in all four extremities.    Gait  Normal casual, toe, heel and tandem gait.       Imaging review: MRI of the lumbar spine shows disc degeneration at L5-S1 with bilateral foraminal narrowing with the right being worse than the left.  At L4-5 disc degeneration with bilateral foraminal narrowing.  At L3-4 bilateral foraminal narrowing with central canal stenosis.  At L2-3 bilateral foraminal narrowing with the left being worse than the right.  At L1-2 disc degeneration with bilateral foraminal narrowing with the left being worse than the right.  No fracture visualized.  No cord signal change.  The conus does terminate at L1.  X-rays of the lumbar spine that was done on  December 17, 2024 shows a degenerative scoliosis deformity.  Disc degeneration is noted most prominently from L2-S1.  No abnormal motion noted in flexion or extension.        Assessment/Plan: The patient is continue to complain of back pain.  He does have disc degeneration with stenosis and a scoliosis deformity.  I would have him go for scoliosis x-rays.  We will refer him to pain management to try injections in his back.  We will refer him to [facility] pain management  For first line conservative care of lumbar pain, I would like to send Mr. Rand for a dedicated course of physician directed physical therapy consisting of 2-3 times a week for 4-6 weeks.  [facility] will [facility]  Return for reassessment with Dr. Daley  I advised the patient to call and return sooner for new or worsening complaints of weakness, paresthesias, gait disturbances, or any additional concerns.  Treatment options discussed in detail with Lars and the patient voiced understanding.  Mr. Rand agrees with this plan of care.       The patient's Body mass index is 29.13 kg/m².. BMI is above normal parameters. Recommendations include: educational material and nutrition counseling    Diagnoses and all orders for this visit:    1. Spinal stenosis in cervical region (Primary)    2. Degeneration of cervical intervertebral disc    3. Chronic bilateral low back pain without sciatica  -     Ambulatory Referral to Physical Therapy for Evaluation & Treatment  -     Ambulatory Referral to Pain Management Clinic  -     XR Spine Scoliosis 2 or 3 Views; Future    4. Degeneration of intervertebral disc of lumbosacral region with discogenic back pain and lower extremity pain  -     Ambulatory Referral to Physical Therapy for Evaluation & Treatment  -     Ambulatory Referral to Pain Management Clinic  -     XR Spine Scoliosis 2 or 3 Views; Future    5. Spinal stenosis, lumbar region, with neurogenic claudication  -     Ambulatory Referral to  Physical Therapy for Evaluation & Treatment  -     Ambulatory Referral to Pain Management Clinic  -     XR Spine Scoliosis 2 or 3 Views; Future    6. Other form of scoliosis of thoracolumbar spine  -     Ambulatory Referral to Physical Therapy for Evaluation & Treatment  -     Ambulatory Referral to Pain Management Clinic  -     XR Spine Scoliosis 2 or 3 Views; Future    7. Overweight with body mass index (BMI) of 29 to 29.9 in adult          I discussed the patients findings and my recommendations with patient    Yang Granados, HAYDER  03/07/25  10:06 CST

## 2025-03-11 LAB
AMPHETAMINES UR QL SCN: NEGATIVE NG/ML
BARBITURATES UR QL SCN: NEGATIVE NG/ML
BENZODIAZ UR QL SCN: NEGATIVE NG/ML
BZE UR QL SCN: NEGATIVE NG/ML
CANNABINOIDS UR QL SCN: NEGATIVE NG/ML
CREAT UR-MCNC: 122.8 MG/DL (ref 20–300)
FENTANYL UR-MCNC: NEGATIVE PG/ML
LABORATORY COMMENT REPORT: ABNORMAL
MEPERIDINE UR QL: NEGATIVE NG/ML
METHADONE UR QL SCN: NEGATIVE NG/ML
OPIATES UR QL SCN: NEGATIVE NG/ML
OXYCODONE+OXYMORPHONE UR QL SCN: POSITIVE
PCP UR QL: NEGATIVE NG/ML
PH UR: 5.5 [PH] (ref 4.5–8.9)
PROPOXYPH UR QL SCN: NEGATIVE NG/ML
SP GR UR: 1.02
TRAMADOL UR QL SCN: NEGATIVE NG/ML

## 2025-04-07 ENCOUNTER — OFFICE VISIT (OUTPATIENT)
Dept: INTERNAL MEDICINE | Facility: CLINIC | Age: 60
End: 2025-04-07
Payer: MEDICARE

## 2025-04-07 VITALS
BODY MASS INDEX: 29.35 KG/M2 | TEMPERATURE: 99 F | DIASTOLIC BLOOD PRESSURE: 78 MMHG | OXYGEN SATURATION: 96 % | HEART RATE: 97 BPM | HEIGHT: 67 IN | SYSTOLIC BLOOD PRESSURE: 120 MMHG | WEIGHT: 187 LBS

## 2025-04-07 DIAGNOSIS — M05.79 RHEUMATOID ARTHRITIS INVOLVING MULTIPLE SITES WITH POSITIVE RHEUMATOID FACTOR: ICD-10-CM

## 2025-04-07 DIAGNOSIS — I10 PRIMARY HYPERTENSION: Primary | ICD-10-CM

## 2025-04-07 DIAGNOSIS — M50.30 DEGENERATION OF CERVICAL INTERVERTEBRAL DISC: ICD-10-CM

## 2025-04-07 DIAGNOSIS — M50.30 DEGENERATIVE DISC DISEASE, CERVICAL: ICD-10-CM

## 2025-04-07 DIAGNOSIS — M48.02 SPINAL STENOSIS IN CERVICAL REGION: ICD-10-CM

## 2025-04-07 RX ORDER — OXYCODONE AND ACETAMINOPHEN 10; 325 MG/1; MG/1
1 TABLET ORAL EVERY 8 HOURS PRN
Qty: 90 TABLET | Refills: 0 | Status: SHIPPED | OUTPATIENT
Start: 2025-04-07

## 2025-04-07 NOTE — PROGRESS NOTES
Subjective     Chief Complaint   Patient presents with    Back Pain    Neck Pain       Back Pain    Neck Pain         Patient's PMR from outside medical facility reviewed and noted.    Lars Rand is a 59 y.o. male who presents for a routine office visit.  Lars Rand returns for follow-up of chronic neck pain dates back to an accident which occurred in 2022 prior to that patient had also had a longstanding history of rheumatoid arthritis with multi joint painand had been following with Dr. Quijano.   his pain is stable, well controlled, and improved after surgery.  pain is stable and well controlled.    Pain described as dull and ache. does radiate and occurs continuously. Patient also experiences the following associated symptoms patient denies any problems .    Current pain level is a 5/10 .  Patient states that durring the past week that his pain has interfered with his enjoyment of life 5/10  with 10 bieng complete interference. The patient also states that during the past week that that the pain has interfered with his general activity 5/10  with 10 bieng complete interference. Patient reports that the pain improves with pain medication, and gets worse with increased activity and weather changes.  This pain limits the patient from minimally interferes with activities of daily living.    The treatment plan and the above PEG score are reviewed with the patient.  Patient feels that he is doing well with the pain and improvement provided to him by the medication.  Patient states he can do more activities of daily living while taking the medication that it significantly impacts the quality of his life.  The patient reports no side effects of his medication, and have no evidence of oversedation, confusion, slurred speech, or abnormal gait at this time.  Patient reports he is compliant with his regimen and not modifying his own dosage and/or timing.  The patient reports that he is not taking any  illicit substances, or alcohol.  Patient continues to do well with the current treatment plan and states that he would like to continue with opioid therapy at this time.  The patient has never had an overdose or needed a rescue medication we are aware of at this office.  A plan is in place discussed when initiating his narcotics for discontinuation should problems arise or pain improve.    Final Peg score: 5     Hypertension remains stable and well-controlled.    Current MME total: 45     Objective Radiological Findings:  Degenerative changes, most advanced at C5-6 and C6-7. On cervical spine xray, Possible partial fusion of the inferior sacroiliac joints on lumbar xray, positive RF     Trearment goals: reduced pain, Increased activities of daily living     History:              Duration of the patient's pain: since before 2010              Legal issues? NO              Current controlled medication and doses: percocet              Medications that have failed: nsaids, norco              Side effects of current medications: yes              Satisfaction with treatment: yes     Functional Status:              Able to feed self: YES              Able to bathe self: YES              Able to use the toilet independently: YES              Able to dress self: YES              Able to get up from bed or chair without assistance: yes     Adverse Events:              Constipation: no              Lethargy: NO                Aberrant Behavior:              Over dose: NO              Run out of medications early: NO              Last UDS consistent: yes              Taking medications as prescribed: YES        Past Medical History:   Past Medical History:   Diagnosis Date    Arthritis     Cervical disc disorder 01/02/2022    Erectile dysfunction 04/2023    GERD (gastroesophageal reflux disease)     Heart attack     Hyperlipidemia     Hypertension     Low back pain     Lumbosacral disc disease     Degenerative Disk Disease     Sleep apnea     Visual impairment 2022    Rosie-Parkinson-White (WPW) pattern      Past Surgical History:  Past Surgical History:   Procedure Laterality Date    ANTERIOR CERVICAL DISCECTOMY W/ FUSION N/A 09/25/2024    Procedure: CERVICAL DISCECTOMY ANTERIOR WITH FUSION C4-5,5-6,6-7;  Surgeon: Cj Daley MD;  Location: [facility];  Service: Neurosurgery;  Laterality: N/A;    EPIDURAL BLOCK  02/2024    MULTIPLE TOOTH EXTRACTIONS      NECK SURGERY  9/25/2024    SPINE SURGERY  9/25/2024    Cervical spine    TONSILECTOMY, ADENOIDECTOMY, BILATERAL MYRINGOTOMY AND TUBES      TONSILLECTOMY  1971     Social History:  reports that he quit smoking about 11 years ago. His smoking use included cigarettes. He started smoking about 48 years ago. He has a 37 pack-year smoking history. He has been exposed to tobacco smoke. He has never used smokeless tobacco. He reports current alcohol use of about 4.0 - 5.0 standard drinks of alcohol per week. He reports that he does not use drugs.    Family History: family history includes Arthritis in his father and mother; Dementia in his mother; Diabetes in his father and mother; Heart failure in his mother; Hypertension in his brother and father; Leukemia in his father; Stroke in his mother; Thyroid cancer in his sister.      Allergies:  No Known Allergies  Medications:  Prior to Admission medications    Medication Sig Start Date End Date Taking? Authorizing Provider   atenolol (TENORMIN) 25 MG tablet Take 1 tablet by mouth Daily. 8/14/24  Yes Óscar Lehman MD   atorvastatin (LIPITOR) 20 MG tablet Take 1 tablet by mouth Daily. 1/16/25  Yes Óscar Lehman MD   cyclobenzaprine (FLEXERIL) 10 MG tablet Take 1 tablet by mouth 3 (Three) Times a Day As Needed for Muscle Spasms. 9/26/24  Yes Yang Granados APRN   folic acid (FOLVITE) 1 MG tablet Take 1 tablet by mouth Daily. 8/14/24  Yes Óscar Lemhan MD   hydroxychloroquine (PLAQUENIL) 200 MG tablet Take  "2 tablets by mouth Daily. Indications: Rheumatoid Arthritis 8/14/24  Yes Óscar Lehman MD   ibuprofen (ADVIL,MOTRIN) 800 MG tablet Take 1 tablet by mouth Every 8 (Eight) Hours As Needed. 2/4/25  Yes Lindsay Melgar MD   naloxone (NARCAN) 4 MG/0.1ML nasal spray Call 911. Don't prime. [place] in 1 nostril for overdose. Repeat in 2-3 minutes in other nostril if no or minimal breathing/responsiveness. 9/26/24  Yes Yang Granados APRN   oxyCODONE-acetaminophen (Percocet)  MG per tablet Take 1 tablet by mouth Every 8 (Eight) Hours As Needed for Moderate Pain. 12/19/24  Yes Óscar Lehman MD   sildenafil (VIAGRA) 100 MG tablet Take 1 tablet by mouth Daily As Needed for Erectile Dysfunction. 2/3/25  Yes Óscar Lehman MD   traZODone (DESYREL) 50 MG tablet Take 1 tablet by mouth Every Night. 8/14/24  Yes Óscar Lehman MD   amoxicillin (AMOXIL) 500 MG capsule Take 1 capsule by mouth 3 (Three) Times a Day. 2/4/25 2/6/25 Yes Lindsay Melgar MD   diclofenac (VOLTAREN) 75 MG EC tablet Take 1 tablet by mouth Every 12 (Twelve) Hours. 12/10/24 2/6/25 Yes Lindsay Melgar MD           Review of systems   negative unless otherwise specified above in HPI    Objective     Vital Signs: /78 (BP Location: Right arm, Patient Position: Sitting, Cuff Size: Adult)   Pulse 97   Temp 99 °F (37.2 °C)   Ht 170.2 cm (67\")   Wt 84.8 kg (187 lb)   SpO2 96%   BMI 29.29 kg/m²     Physical Exam  Vitals and nursing note reviewed.   Constitutional:       General: He is not in acute distress.     Appearance: Normal appearance.   HENT:      Head: Normocephalic.   Eyes:      Extraocular Movements: Extraocular movements intact.      Pupils: Pupils are equal, round, and reactive to light.   Cardiovascular:      Rate and Rhythm: Normal rate and regular rhythm.      Heart sounds: Normal heart sounds. No murmur heard.  Pulmonary:      Effort: Pulmonary effort is normal. No " respiratory distress.      Breath sounds: Normal breath sounds. No rhonchi or rales.   Abdominal:      General: Abdomen is flat. Bowel sounds are normal.      Palpations: Abdomen is soft.   Neurological:      General: No focal deficit present.      Mental Status: He is alert.                Results Reviewed:  Glucose   Date Value Ref Range Status   09/26/2024 102 (H) 65 - 99 mg/dL Final     BUN   Date Value Ref Range Status   09/26/2024 13 6 - 20 mg/dL Final   06/17/2022 39 (H) 6 - 20 mg/dL Final     Creatinine   Date Value Ref Range Status   09/26/2024 0.98 0.76 - 1.27 mg/dL Final   06/17/2022 1.23 0.80 - 1.30 mg/dL Final     Sodium   Date Value Ref Range Status   09/26/2024 136 136 - 145 mmol/L Final   06/17/2022 134 (L) 136 - 144 mmol/L Final     Potassium   Date Value Ref Range Status   09/26/2024 4.2 3.5 - 5.2 mmol/L Final   06/17/2022 4.6 3.5 - 5.1 mmol/L Final     Chloride   Date Value Ref Range Status   09/26/2024 102 98 - 107 mmol/L Final   06/17/2022 105 100 - 110 mmol/L Final     CO2   Date Value Ref Range Status   09/26/2024 27.0 22.0 - 29.0 mmol/L Final     Calcium   Date Value Ref Range Status   09/26/2024 8.3 (L) 8.6 - 10.5 mg/dL Final   06/17/2022 8.6 (L) 8.9 - 10.3 mg/dL Final     ALT (SGPT)   Date Value Ref Range Status   09/04/2024 16 0 - 44 IU/L Final   06/16/2022 11 <=41 U/L Final     AST (SGOT)   Date Value Ref Range Status   09/04/2024 17 0 - 40 IU/L Final   06/16/2022 13 <=40 U/L Final     WBC   Date Value Ref Range Status   09/26/2024 7.89 3.40 - 10.80 10*3/mm3 Final   09/04/2024 6.2 3.4 - 10.8 x10E3/uL Final     Hematocrit   Date Value Ref Range Status   09/26/2024 34.4 (L) 37.5 - 51.0 % Final     Platelets   Date Value Ref Range Status   09/26/2024 175 140 - 450 10*3/mm3 Final     Triglycerides   Date Value Ref Range Status   09/04/2024 292 (H) 0 - 149 mg/dL Final     HDL Cholesterol   Date Value Ref Range Status   09/04/2024 46 >39 mg/dL Final     LDL Chol Calc ([facility])   Date Value Ref  Range Status   09/04/2024 70 0 - 99 mg/dL Final     Hemoglobin A1C   Date Value Ref Range Status   09/04/2024 5.5 4.8 - 5.6 % Final     Comment:              Prediabetes: 5.7 - 6.4           Diabetes: >6.4           Glycemic control for adults with diabetes: <7.0               Procedure   Procedures       Assessment / Plan     Assessment/Plan:   Diagnosis Plan   1. Primary hypertension        2. Degeneration of cervical intervertebral disc  oxyCODONE-acetaminophen (Percocet)  MG per tablet      3. Degenerative disc disease, cervical  oxyCODONE-acetaminophen (Percocet)  MG per tablet      4. Spinal stenosis in cervical region  oxyCODONE-acetaminophen (Percocet)  MG per tablet      5. Rheumatoid arthritis involving multiple sites with positive rheumatoid factor  oxyCODONE-acetaminophen (Percocet)  MG per tablet              No follow-ups on file. unless patient needs to be seen sooner or acute issues arise.    As part of this patient's treatment plan, I am prescribing controlled substances. The patient has been made aware of appropriate use of such medications, including potential risk of somnolence, limited ability to drive and /or work safely, and potential for dependence or overdose. It has also been made clear that these medications are for use by this patient only, without concomitant use of alcohol or other substances unless prescribed.  Patient has been advised that PRN or as needed pain medication allows the patient to skip doses if not needed, but that the medication is not to be taken more often or at higher doses than prescribed.    Patient has completed prescribing agreement detailing terms of continued prescribing of controlled substances, including monitoring KEVIN reports, urine drug screening, and pill counts if necessary. The patient is aware that inappropriate use will result in cessation of prescribing such medications, and possible termination from this practice.    History  and physical exam exhibit continued safe and appropriate use of controlled substances.    1.  Controlled substance abuse agreement discussed and copy in record: YES  2.  Discussed:   Risk of addiction: YES   Specific risk of medications:YES   Side effects of medications: YES   Reasonable expectations of the medication: YES  3.  Treatment Objectives:   Discussed management of constipation: YES   Discussed management of other side effects: YES  4.  eKASPER:     KEVIN report has been reviewed by: Óscar Lehman MD on 04/07/25 in the PDMD in the electronic medical record.  [unfilled]   Results/Date of last KEVIN:  appropriate  5.  Results/Date of last drug screen:  appropriate  6.  Follow up plan:    Follow Up in One Months Time              Continue on current medications as directed    EMR Dictation/Transcription disclaimer:   Some of this note may be an electronic transcription/translation of spoken language to printed text. The electronic translation of spoken language may permit erroneous, or at times, nonsensical words or phrases to be inadvertently transcribed; Although I have reviewed the note for such errors, some may still exist.     I have discussed the patient results/orders and and plan/recommendation with them at today's visit.      Signed by:    Óscar Lehman MD Date: 04/07/25

## 2025-05-06 ENCOUNTER — OFFICE VISIT (OUTPATIENT)
Dept: INTERNAL MEDICINE | Facility: CLINIC | Age: 60
End: 2025-05-06
Payer: MEDICARE

## 2025-05-06 VITALS
TEMPERATURE: 97.7 F | BODY MASS INDEX: 28.63 KG/M2 | HEIGHT: 67 IN | RESPIRATION RATE: 14 BRPM | SYSTOLIC BLOOD PRESSURE: 144 MMHG | WEIGHT: 182.4 LBS | DIASTOLIC BLOOD PRESSURE: 82 MMHG | OXYGEN SATURATION: 98 % | HEART RATE: 62 BPM

## 2025-05-06 DIAGNOSIS — M50.30 DEGENERATION OF CERVICAL INTERVERTEBRAL DISC: ICD-10-CM

## 2025-05-06 DIAGNOSIS — M50.30 DEGENERATIVE DISC DISEASE, CERVICAL: ICD-10-CM

## 2025-05-06 DIAGNOSIS — M05.79 RHEUMATOID ARTHRITIS INVOLVING MULTIPLE SITES WITH POSITIVE RHEUMATOID FACTOR: ICD-10-CM

## 2025-05-06 DIAGNOSIS — M48.02 SPINAL STENOSIS IN CERVICAL REGION: ICD-10-CM

## 2025-05-06 RX ORDER — OXYCODONE AND ACETAMINOPHEN 10; 325 MG/1; MG/1
1 TABLET ORAL EVERY 8 HOURS PRN
Qty: 90 TABLET | Refills: 0 | Status: SHIPPED | OUTPATIENT
Start: 2025-05-06

## 2025-05-06 NOTE — PROGRESS NOTES
Subjective     Chief Complaint   Patient presents with    Neck Pain     Follow up; Patient states having a pain level of 3/10. Dr. Dlaey sent pain management and would not like to do it, said it does not work    Hypertension       Back Pain    Neck Pain     Hypertension  Associated symptoms: neck pain        Patient's PMR from outside medical facility reviewed and noted.    Lars Rand is a 59 y.o. male who presents for a routine office visit.  He returns for follow-up of chronic neck pain dates back to an accident which occurred in 2022 prior to that patient had also had a longstanding history of rheumatoid arthritis with multi joint painand had been following with Dr. Quijano.   his pain is stable, well controlled, and improved after surgery.  pain is stable and well controlled.      Patient does not want to continue with pain management.  We will continue to follow him here for is regular pain.    Pain described as sharp, shooting, and stabbing. does radiate and occurs continuously. Patient also experiences the following associated symptoms patient denies any problems .    Current pain level is a 3/10 .  Patient states that durring the past week that his pain has interfered with his enjoyment of life 3/10  with 10 bieng complete interference. The patient also states that during the past week that that the pain has interfered with his general activity 3/10  with 10 bieng complete interference. Patient reports that the pain improves with pain medication, and gets worse with increased activity and weather changes.  This pain limits the patient from minimally interferes with activities of daily living.    The treatment plan and the above PEG score are reviewed with the patient.  Patient feels that he is doing well with the pain and improvement provided to him by the medication.  Patient states he can do more activities of daily living while taking the medication that it significantly impacts the  quality of his life.  The patient reports no side effects of his medication, and have no evidence of oversedation, confusion, slurred speech, or abnormal gait at this time.  Patient reports he is compliant with his regimen and not modifying his own dosage and/or timing.  The patient reports that he is not taking any illicit substances, or alcohol.  Patient continues to do well with the current treatment plan and states that he would like to continue with opioid therapy at this time.  The patient has never had an overdose or needed a rescue medication we are aware of at this office.  A plan is in place discussed when initiating his narcotics for discontinuation should problems arise or pain improve.    Final Peg score: 3     Hypertension remains stable and well-controlled.    Current MME total: 45     Objective Radiological Findings:  Degenerative changes, most advanced at C5-6 and C6-7. On cervical spine xray, Possible partial fusion of the inferior sacroiliac joints on lumbar xray, positive RF     Trearment goals: reduced pain, Increased activities of daily living     History:              Duration of the patient's pain: since before 2010              Legal issues? NO              Current controlled medication and doses: percocet              Medications that have failed: nsaids, norco              Side effects of current medications: yes              Satisfaction with treatment: yes     Functional Status:              Able to feed self: YES              Able to bathe self: YES              Able to use the toilet independently: YES              Able to dress self: YES              Able to get up from bed or chair without assistance: yes     Adverse Events:              Constipation: no              Lethargy: NO                Aberrant Behavior:              Over dose: NO              Run out of medications early: NO              Last UDS consistent: yes              Taking medications as prescribed: YES        Past  Medical History:   Past Medical History:   Diagnosis Date    Arthritis     Cervical disc disorder 01/02/2022    Erectile dysfunction 04/2023    GERD (gastroesophageal reflux disease)     Heart attack     Hyperlipidemia     Hypertension     Low back pain     Lumbosacral disc disease     Degenerative Disk Disease    Sleep apnea     Visual impairment 2022    Rosie-Parkinson-White (WPW) pattern      Past Surgical History:  Past Surgical History:   Procedure Laterality Date    ANTERIOR CERVICAL DISCECTOMY W/ FUSION N/A 09/25/2024    Procedure: CERVICAL DISCECTOMY ANTERIOR WITH FUSION C4-5,5-6,6-7;  Surgeon: Cj Daley MD;  Location: [facility];  Service: Neurosurgery;  Laterality: N/A;    EPIDURAL BLOCK  02/2024    MULTIPLE TOOTH EXTRACTIONS      NECK SURGERY  9/25/2024    SPINE SURGERY  9/25/2024    Cervical spine    TONSILECTOMY, ADENOIDECTOMY, BILATERAL MYRINGOTOMY AND TUBES      TONSILLECTOMY  1971     Social History:  reports that he quit smoking about 11 years ago. His smoking use included cigarettes. He started smoking about 48 years ago. He has a 37 pack-year smoking history. He has been exposed to tobacco smoke. He has never used smokeless tobacco. He reports current alcohol use of about 4.0 - 5.0 standard drinks of alcohol per week. He reports that he does not use drugs.    Family History: family history includes Arthritis in his father and mother; Dementia in his mother; Diabetes in his father and mother; Heart failure in his mother; Hypertension in his brother and father; Leukemia in his father; Stroke in his mother; Thyroid cancer in his sister.      Allergies:  No Known Allergies  Medications:  Prior to Admission medications    Medication Sig Start Date End Date Taking? Authorizing Provider   atenolol (TENORMIN) 25 MG tablet Take 1 tablet by mouth Daily. 8/14/24  Yes Óscar Lehman MD   atorvastatin (LIPITOR) 20 MG tablet Take 1 tablet by mouth Daily. 1/16/25  Yes Óscar Lehman  "MD   cyclobenzaprine (FLEXERIL) 10 MG tablet Take 1 tablet by mouth 3 (Three) Times a Day As Needed for Muscle Spasms. 9/26/24  Yes Yang Granados APRN   folic acid (FOLVITE) 1 MG tablet Take 1 tablet by mouth Daily. 8/14/24  Yes Óscar Lehman MD   hydroxychloroquine (PLAQUENIL) 200 MG tablet Take 2 tablets by mouth Daily. Indications: Rheumatoid Arthritis 8/14/24  Yes Óscar Lehman MD   ibuprofen (ADVIL,MOTRIN) 800 MG tablet Take 1 tablet by mouth Every 8 (Eight) Hours As Needed. 2/4/25  Yes ProviderLindsay MD   naloxone (NARCAN) 4 MG/0.1ML nasal spray Call 911. Don't prime. [place] in 1 nostril for overdose. Repeat in 2-3 minutes in other nostril if no or minimal breathing/responsiveness. 9/26/24  Yes Yang Granados APRN   oxyCODONE-acetaminophen (Percocet)  MG per tablet Take 1 tablet by mouth Every 8 (Eight) Hours As Needed for Moderate Pain. 12/19/24  Yes Óscar Lehman MD   sildenafil (VIAGRA) 100 MG tablet Take 1 tablet by mouth Daily As Needed for Erectile Dysfunction. 2/3/25  Yes Óscar Lehman MD   traZODone (DESYREL) 50 MG tablet Take 1 tablet by mouth Every Night. 8/14/24  Yes Óscar Lehman MD   amoxicillin (AMOXIL) 500 MG capsule Take 1 capsule by mouth 3 (Three) Times a Day. 2/4/25 2/6/25 Yes Lindsay Melgar MD   diclofenac (VOLTAREN) 75 MG EC tablet Take 1 tablet by mouth Every 12 (Twelve) Hours. 12/10/24 2/6/25 Yes ProviderLindsay MD           Review of systems   negative unless otherwise specified above in HPI    Objective     Vital Signs: /82 (BP Location: Right arm, Patient Position: Sitting, Cuff Size: Adult)   Pulse 62   Temp 97.7 °F (36.5 °C)   Resp 14   Ht 170.2 cm (67.01\")   Wt 82.7 kg (182 lb 6.4 oz)   SpO2 98%   BMI 28.56 kg/m²     Physical Exam  Vitals and nursing note reviewed.   Constitutional:       General: He is not in acute distress.     Appearance: Normal appearance.   HENT:      " Head: Normocephalic.   Eyes:      Extraocular Movements: Extraocular movements intact.      Pupils: Pupils are equal, round, and reactive to light.   Cardiovascular:      Rate and Rhythm: Normal rate and regular rhythm.      Heart sounds: Normal heart sounds. No murmur heard.  Pulmonary:      Effort: Pulmonary effort is normal. No respiratory distress.      Breath sounds: Normal breath sounds. No rhonchi or rales.   Abdominal:      General: Abdomen is flat. Bowel sounds are normal.      Palpations: Abdomen is soft.   Neurological:      General: No focal deficit present.      Mental Status: He is alert.           Results Reviewed:  Glucose   Date Value Ref Range Status   09/26/2024 102 (H) 65 - 99 mg/dL Final     BUN   Date Value Ref Range Status   09/26/2024 13 6 - 20 mg/dL Final   06/17/2022 39 (H) 6 - 20 mg/dL Final     Creatinine   Date Value Ref Range Status   09/26/2024 0.98 0.76 - 1.27 mg/dL Final   06/17/2022 1.23 0.80 - 1.30 mg/dL Final     Sodium   Date Value Ref Range Status   09/26/2024 136 136 - 145 mmol/L Final   06/17/2022 134 (L) 136 - 144 mmol/L Final     Potassium   Date Value Ref Range Status   09/26/2024 4.2 3.5 - 5.2 mmol/L Final   06/17/2022 4.6 3.5 - 5.1 mmol/L Final     Chloride   Date Value Ref Range Status   09/26/2024 102 98 - 107 mmol/L Final   06/17/2022 105 100 - 110 mmol/L Final     CO2   Date Value Ref Range Status   09/26/2024 27.0 22.0 - 29.0 mmol/L Final     Calcium   Date Value Ref Range Status   09/26/2024 8.3 (L) 8.6 - 10.5 mg/dL Final   06/17/2022 8.6 (L) 8.9 - 10.3 mg/dL Final     ALT (SGPT)   Date Value Ref Range Status   09/04/2024 16 0 - 44 IU/L Final   06/16/2022 11 <=41 U/L Final     AST (SGOT)   Date Value Ref Range Status   09/04/2024 17 0 - 40 IU/L Final   06/16/2022 13 <=40 U/L Final     WBC   Date Value Ref Range Status   09/26/2024 7.89 3.40 - 10.80 10*3/mm3 Final   09/04/2024 6.2 3.4 - 10.8 x10E3/uL Final     Hematocrit   Date Value Ref Range Status   09/26/2024  34.4 (L) 37.5 - 51.0 % Final     Platelets   Date Value Ref Range Status   09/26/2024 175 140 - 450 10*3/mm3 Final     Triglycerides   Date Value Ref Range Status   09/04/2024 292 (H) 0 - 149 mg/dL Final     HDL Cholesterol   Date Value Ref Range Status   09/04/2024 46 >39 mg/dL Final     LDL Chol Calc (NIH)   Date Value Ref Range Status   09/04/2024 70 0 - 99 mg/dL Final     Hemoglobin A1C   Date Value Ref Range Status   09/04/2024 5.5 4.8 - 5.6 % Final     Comment:              Prediabetes: 5.7 - 6.4           Diabetes: >6.4           Glycemic control for adults with diabetes: <7.0               Procedure   Procedures       Assessment / Plan     Assessment/Plan:   Diagnosis Plan   1. Degeneration of cervical intervertebral disc  oxyCODONE-acetaminophen (Percocet)  MG per tablet      2. Degenerative disc disease, cervical  oxyCODONE-acetaminophen (Percocet)  MG per tablet      3. Spinal stenosis in cervical region  oxyCODONE-acetaminophen (Percocet)  MG per tablet      4. Rheumatoid arthritis involving multiple sites with positive rheumatoid factor  oxyCODONE-acetaminophen (Percocet)  MG per tablet                Return in about 4 weeks (around 6/3/2025) for Recheck. unless patient needs to be seen sooner or acute issues arise.    As part of this patient's treatment plan, I am prescribing controlled substances. The patient has been made aware of appropriate use of such medications, including potential risk of somnolence, limited ability to drive and /or work safely, and potential for dependence or overdose. It has also been made clear that these medications are for use by this patient only, without concomitant use of alcohol or other substances unless prescribed.  Patient has been advised that PRN or as needed pain medication allows the patient to skip doses if not needed, but that the medication is not to be taken more often or at higher doses than prescribed.    Patient has completed  prescribing agreement detailing terms of continued prescribing of controlled substances, including monitoring KEVIN reports, urine drug screening, and pill counts if necessary. The patient is aware that inappropriate use will result in cessation of prescribing such medications, and possible termination from this practice.    History and physical exam exhibit continued safe and appropriate use of controlled substances.    1.  Controlled substance abuse agreement discussed and copy in record: YES  2.  Discussed:   Risk of addiction: YES   Specific risk of medications:YES   Side effects of medications: YES   Reasonable expectations of the medication: YES  3.  Treatment Objectives:   Discussed management of constipation: YES   Discussed management of other side effects: YES  4.  eKASPER:     KEVIN report has been reviewed by: Óscar Lehman MD on 05/06/25 in the PDMD in the electronic medical record.  [unfilled]   Results/Date of last KEVIN:  appropriate  5.  Results/Date of last drug screen:  appropriate  6.  Follow up plan:    Follow Up in One Months Time              Continue on current medications as directed    EMR Dictation/Transcription disclaimer:   Some of this note may be an electronic transcription/translation of spoken language to printed text. The electronic translation of spoken language may permit erroneous, or at times, nonsensical words or phrases to be inadvertently transcribed; Although I have reviewed the note for such errors, some may still exist.     I have discussed the patient results/orders and and plan/recommendation with them at today's visit.      Signed by:    Óscar Lehman MD Date: 05/06/25

## 2025-06-03 DIAGNOSIS — N52.9 ERECTILE DYSFUNCTION, UNSPECIFIED ERECTILE DYSFUNCTION TYPE: ICD-10-CM

## 2025-06-03 RX ORDER — SILDENAFIL 100 MG/1
100 TABLET, FILM COATED ORAL DAILY PRN
Qty: 30 TABLET | Refills: 3 | Status: SHIPPED | OUTPATIENT
Start: 2025-06-03

## 2025-06-10 ENCOUNTER — OFFICE VISIT (OUTPATIENT)
Dept: INTERNAL MEDICINE | Facility: CLINIC | Age: 60
End: 2025-06-10
Payer: MEDICARE

## 2025-06-10 VITALS
WEIGHT: 182 LBS | OXYGEN SATURATION: 99 % | DIASTOLIC BLOOD PRESSURE: 83 MMHG | BODY MASS INDEX: 28.56 KG/M2 | HEIGHT: 67 IN | HEART RATE: 50 BPM | TEMPERATURE: 98.1 F | SYSTOLIC BLOOD PRESSURE: 144 MMHG

## 2025-06-10 DIAGNOSIS — M50.30 DEGENERATION OF CERVICAL INTERVERTEBRAL DISC: ICD-10-CM

## 2025-06-10 DIAGNOSIS — M48.02 SPINAL STENOSIS IN CERVICAL REGION: ICD-10-CM

## 2025-06-10 DIAGNOSIS — M50.30 DEGENERATIVE DISC DISEASE, CERVICAL: ICD-10-CM

## 2025-06-10 DIAGNOSIS — M05.79 RHEUMATOID ARTHRITIS INVOLVING MULTIPLE SITES WITH POSITIVE RHEUMATOID FACTOR: ICD-10-CM

## 2025-06-10 RX ORDER — OXYCODONE AND ACETAMINOPHEN 10; 325 MG/1; MG/1
1 TABLET ORAL EVERY 8 HOURS PRN
Qty: 90 TABLET | Refills: 0 | Status: SHIPPED | OUTPATIENT
Start: 2025-06-10

## 2025-06-10 NOTE — PROGRESS NOTES
Subjective     Chief Complaint   Patient presents with    Pain     1 month F/U medication refill        History of Present Illness    Patient's PMR from outside medical facility reviewed and noted.    Lars Rand is a 60 y.o. male who presents for a routine office visit.  He returns for follow-up of chronic neck pain dates back to an accident which occurred in 2022 prior to that patient had also had a longstanding history of rheumatoid arthritis with multi joint painand had been following with Dr. Quijano.   his pain is stable, well controlled, and improved after surgery.  pain is stable and well controlled.      The patients possible risk factors for increased risk of harm from opiates has been discussed and reviewed with him.  Patient has tried multiple other therapies including anti-inflammatories in the past which have been ineffective in controlling his chronic pain, I will continue this patient on opioid therapy at this time.  The patient states that he has had no side effects of his medication, and shows no evidence of oversedation, confusion, slurred speech, or abnormal gait at this time.   The patient states that he is not modifying his own regimen, and that he is not taking any other illicit substances not prescribed by this office.  Patient states that he continues to benefit from the medication as far as his daily Functional Capacity and Abilities.  Treatment plan is reviewed in he continues to be compliant at this time.  Prior PEG scale reviewed at this time.  The patient has never had an overdose or needed a rescue medication we are aware of at this office.  A plan is in place discussed when initiating his narcotics for discontinuation should problems arise or pain improve.    Pain characteristics:  Current pain level : 5/10 as reported by patient.    Pain Description :  dull, ache, shooting, and stabbing. does radiate and occurs several times per day.   Alleviating factors :   pain  ED Nurse Note:



Patient is being discharged from medical care. D/C instruction given to patient 
and [employer] officer. All questions were answered. Patient ambulated out with 
steady gait. medication  Aggravating factors : walking, standing, and weather changes  Associated Symptoms:  patient denies any problems    Limitations: This pain limits the patient from doing several activities of daily living    PEG scale:  1. Pain on average over the Last week 5/10  2. Patient states that durring the past week that thier pain has interfered with his enjoyment of life 5/10  with 10 bieng complete interference.  3. The patient also states that during the past week that that his pain has interfered with his general activity 5/10  with 10 bieng complete interference      Final Peg Score: 5    No other problems, complaints. or concerns noted.     Current MME total: 45     Objective Radiological Findings:  Degenerative changes, most advanced at C5-6 and C6-7. On cervical spine xray, Possible partial fusion of the inferior sacroiliac joints on lumbar xray, positive RF     Trearment goals: reduced pain, Increased activities of daily living     History:              Duration of the patient's pain: since before 2010              Legal issues? NO              Current controlled medication and doses: percocet              Medications that have failed: nsaids, norco              Side effects of current medications: yes              Satisfaction with treatment: yes     Functional Status:              Able to feed self: YES              Able to bathe self: YES              Able to use the toilet independently: YES              Able to dress self: YES              Able to get up from bed or chair without assistance: yes     Adverse Events:              Constipation: no              Lethargy: NO                Aberrant Behavior:              Over dose: NO              Run out of medications early: NO              Last UDS consistent: yes              Taking medications as prescribed: YES          Past Medical History:   Past Medical History:   Diagnosis Date    Arthritis     Cervical disc disorder 01/02/2022    Erectile  dysfunction 04/2023    GERD (gastroesophageal reflux disease)     Heart attack     Hyperlipidemia     Hypertension     Low back pain     Lumbosacral disc disease     Degenerative Disk Disease    Sleep apnea     Visual impairment 2022    Rosie-Parkinson-White (WPW) pattern      Past Surgical History:  Past Surgical History:   Procedure Laterality Date    ANTERIOR CERVICAL DISCECTOMY W/ FUSION N/A 09/25/2024    Procedure: CERVICAL DISCECTOMY ANTERIOR WITH FUSION C4-5,5-6,6-7;  Surgeon: Cj Daley MD;  Location: [facility];  Service: Neurosurgery;  Laterality: N/A;    EPIDURAL BLOCK  02/2024    MULTIPLE TOOTH EXTRACTIONS      NECK SURGERY  9/25/2024    SPINE SURGERY  9/25/2024    Cervical spine    TONSILECTOMY, ADENOIDECTOMY, BILATERAL MYRINGOTOMY AND TUBES      TONSILLECTOMY  1971     Social History:  reports that he quit smoking about 11 years ago. His smoking use included cigarettes. He started smoking about 48 years ago. He has a 37 pack-year smoking history. He has been exposed to tobacco smoke. He has never used smokeless tobacco. He reports current alcohol use of about 4.0 - 5.0 standard drinks of alcohol per week. He reports that he does not use drugs.    Family History: family history includes Arthritis in his father and mother; Dementia in his mother; Diabetes in his father and mother; Heart failure in his mother; Hypertension in his brother and father; Leukemia in his father; Stroke in his mother; Thyroid cancer in his sister.      Allergies:  No Known Allergies  Medications:  Prior to Admission medications    Medication Sig Start Date End Date Taking? Authorizing Provider   atenolol (TENORMIN) 25 MG tablet Take 1 tablet by mouth Daily. 8/14/24  Yes Óscar Lehman MD   atorvastatin (LIPITOR) 20 MG tablet Take 1 tablet by mouth Daily. 1/16/25  Yes Óscar Lehman MD   diclofenac (VOLTAREN) 75 MG EC tablet Take 1 tablet by mouth Every 12 (Twelve) Hours. 2/7/25  Yes Provider,  "MD Lindsay   folic acid (FOLVITE) 1 MG tablet Take 1 tablet by mouth Daily. 8/14/24  Yes Óscar Lehman MD   hydroxychloroquine (PLAQUENIL) 200 MG tablet Take 2 tablets by mouth Daily. Indications: Rheumatoid Arthritis 8/14/24  Yes Óscar Lehman MD   ibuprofen (ADVIL,MOTRIN) 800 MG tablet Take 1 tablet by mouth Every 8 (Eight) Hours As Needed for Moderate Pain. 2/6/25  Yes Óscar Lehman MD   naloxone (NARCAN) 4 MG/0.1ML nasal spray Call 911. Don't prime. [place] in 1 nostril for overdose. Repeat in 2-3 minutes in other nostril if no or minimal breathing/responsiveness. 9/26/24  Yes Yang Granados APRN   oxyCODONE-acetaminophen (Percocet)  MG per tablet Take 1 tablet by mouth Every 8 (Eight) Hours As Needed for Moderate Pain. 5/6/25  Yes Óscar Lehman MD   sildenafil (VIAGRA) 100 MG tablet Take 1 tablet by mouth Daily As Needed for Erectile Dysfunction.  Patient taking differently: Take 1 tablet by mouth As Needed for Erectile Dysfunction. 6/3/25  Yes Óscar Lehman MD   traZODone (DESYREL) 50 MG tablet Take 1 tablet by mouth Every Night. 8/14/24  Yes Óscar Lehman MD   cyclobenzaprine (FLEXERIL) 10 MG tablet Take 1 tablet by mouth 3 (Three) Times a Day As Needed for Muscle Spasms.  Patient not taking: Reported on 6/10/2025 9/26/24   Yang Granados APRN       NILDA:      PHQ:  The PHQ has not been completed during this encounter.            Review of systems   negative unless otherwise specified above in HPI    Objective     Vital Signs: /83   Pulse 50   Temp 98.1 °F (36.7 °C)   Ht 170.2 cm (67.01\")   Wt 82.6 kg (182 lb)   SpO2 99%   BMI 28.50 kg/m²     Physical Exam  Vitals and nursing note reviewed.   Constitutional:       General: He is not in acute distress.     Appearance: Normal appearance.   HENT:      Head: Normocephalic.   Eyes:      Extraocular Movements: Extraocular movements intact.      Pupils: Pupils are " equal, round, and reactive to light.   Cardiovascular:      Rate and Rhythm: Normal rate and regular rhythm.      Heart sounds: Normal heart sounds. No murmur heard.  Pulmonary:      Effort: Pulmonary effort is normal. No respiratory distress.      Breath sounds: Normal breath sounds. No rhonchi or rales.   Abdominal:      General: Bowel sounds are normal. There is no distension.      Palpations: Abdomen is soft.   Neurological:      General: No focal deficit present.      Mental Status: He is alert.                Results Reviewed:  Glucose   Date Value Ref Range Status   09/26/2024 102 (H) 65 - 99 mg/dL Final     BUN   Date Value Ref Range Status   09/26/2024 13 6 - 20 mg/dL Final   06/17/2022 39 (H) 6 - 20 mg/dL Final     Creatinine   Date Value Ref Range Status   09/26/2024 0.98 0.76 - 1.27 mg/dL Final   06/17/2022 1.23 0.80 - 1.30 mg/dL Final     Sodium   Date Value Ref Range Status   09/26/2024 136 136 - 145 mmol/L Final   06/17/2022 134 (L) 136 - 144 mmol/L Final     Potassium   Date Value Ref Range Status   09/26/2024 4.2 3.5 - 5.2 mmol/L Final   06/17/2022 4.6 3.5 - 5.1 mmol/L Final     Chloride   Date Value Ref Range Status   09/26/2024 102 98 - 107 mmol/L Final   06/17/2022 105 100 - 110 mmol/L Final     CO2   Date Value Ref Range Status   09/26/2024 27.0 22.0 - 29.0 mmol/L Final     Calcium   Date Value Ref Range Status   09/26/2024 8.3 (L) 8.6 - 10.5 mg/dL Final   06/17/2022 8.6 (L) 8.9 - 10.3 mg/dL Final     ALT (SGPT)   Date Value Ref Range Status   09/04/2024 16 0 - 44 IU/L Final   06/16/2022 11 <=41 U/L Final     AST (SGOT)   Date Value Ref Range Status   09/04/2024 17 0 - 40 IU/L Final   06/16/2022 13 <=40 U/L Final     WBC   Date Value Ref Range Status   09/26/2024 7.89 3.40 - 10.80 10*3/mm3 Final   09/04/2024 6.2 3.4 - 10.8 x10E3/uL Final     Hematocrit   Date Value Ref Range Status   09/26/2024 34.4 (L) 37.5 - 51.0 % Final     Platelets   Date Value Ref Range Status   09/26/2024 175 140 - 450  10*3/mm3 Final     Triglycerides   Date Value Ref Range Status   09/04/2024 292 (H) 0 - 149 mg/dL Final     HDL Cholesterol   Date Value Ref Range Status   09/04/2024 46 >39 mg/dL Final     LDL Chol Calc (NIH)   Date Value Ref Range Status   09/04/2024 70 0 - 99 mg/dL Final     Hemoglobin A1C   Date Value Ref Range Status   09/04/2024 5.5 4.8 - 5.6 % Final     Comment:              Prediabetes: 5.7 - 6.4           Diabetes: >6.4           Glycemic control for adults with diabetes: <7.0               Procedure   Procedures       Assessment / Plan     Assessment/Plan:   Diagnosis Plan   1. Degeneration of cervical intervertebral disc  oxyCODONE-acetaminophen (Percocet)  MG per tablet      2. Degenerative disc disease, cervical  oxyCODONE-acetaminophen (Percocet)  MG per tablet      3. Spinal stenosis in cervical region  oxyCODONE-acetaminophen (Percocet)  MG per tablet      4. Rheumatoid arthritis involving multiple sites with positive rheumatoid factor  oxyCODONE-acetaminophen (Percocet)  MG per tablet            Return in about 4 weeks (around 7/8/2025) for Recheck. unless patient needs to be seen sooner or acute issues arise.      I have discussed the patient results/orders and and plan/recommendation with them at today's visit.      Signed by:    Óscar Lehman MD Date: 06/10/25

## 2025-06-17 ENCOUNTER — OFFICE VISIT (OUTPATIENT)
Dept: NEUROSURGERY | Facility: CLINIC | Age: 60
End: 2025-06-17
Payer: MEDICARE

## 2025-06-17 VITALS — HEIGHT: 67 IN | BODY MASS INDEX: 28.72 KG/M2 | WEIGHT: 183 LBS

## 2025-06-17 DIAGNOSIS — M54.50 CHRONIC BILATERAL LOW BACK PAIN WITHOUT SCIATICA: ICD-10-CM

## 2025-06-17 DIAGNOSIS — M41.85 OTHER FORM OF SCOLIOSIS OF THORACOLUMBAR SPINE: ICD-10-CM

## 2025-06-17 DIAGNOSIS — M48.062 SPINAL STENOSIS, LUMBAR REGION, WITH NEUROGENIC CLAUDICATION: ICD-10-CM

## 2025-06-17 DIAGNOSIS — G89.29 CHRONIC BILATERAL LOW BACK PAIN WITHOUT SCIATICA: ICD-10-CM

## 2025-06-17 DIAGNOSIS — M51.372 DEGENERATION OF INTERVERTEBRAL DISC OF LUMBOSACRAL REGION WITH DISCOGENIC BACK PAIN AND LOWER EXTREMITY PAIN: Primary | ICD-10-CM

## 2025-06-17 DIAGNOSIS — Z87.891 FORMER SMOKER: ICD-10-CM

## 2025-06-17 DIAGNOSIS — E66.3 OVERWEIGHT WITH BODY MASS INDEX (BMI) OF 28 TO 28.9 IN ADULT: ICD-10-CM

## 2025-06-17 PROCEDURE — 1160F RVW MEDS BY RX/DR IN RCRD: CPT | Performed by: NEUROLOGICAL SURGERY

## 2025-06-17 PROCEDURE — 1159F MED LIST DOCD IN RCRD: CPT | Performed by: NEUROLOGICAL SURGERY

## 2025-06-17 PROCEDURE — 99213 OFFICE O/P EST LOW 20 MIN: CPT | Performed by: NEUROLOGICAL SURGERY

## 2025-06-17 NOTE — PATIENT INSTRUCTIONS
"PATIENT TO CONTINUE TO FOLLOW UP WITH HIS PRIMARY CARE PROVIDER FOR YEARLY PHYSICAL EXAMS TO ENSURE COMPLETE HEALTH MAINTENANCE      BMI for Adults  Body mass index (BMI) is a number found using a person's weight and height. BMI can help tell how much of a person's weight is made up of fat. BMI does not measure body fat directly. It is used instead of tests that directly measure body fat, which can be difficult and expensive.  What are BMI measurements used for?  BMI is useful to:  Find out if your weight puts you at higher risk for medical problems.  Help recommend changes, such as in diet and exercise. This can help you reach a healthy weight. BMI screening can be done again to see if these changes are working.  How is BMI calculated?  Your height and weight are measured. The BMI is found from those numbers. This can be done with [place] or metric measurements. Note that charts and online BMI calculators are available to help you find your BMI quickly and easily without doing these calculations.  To calculate your BMI in [place] measurements:  Measure your weight in pounds (lb).  Multiply the number of pounds by 703.  So, for an adult who weighs 150 lb, multiply that number by 703: 150 x 703, which equals 105,450.  Measure your height in inches. Then multiply that number by itself to get a measurement called \"inches squared.\"  So, for an adult who is 70 inches tall, the \"inches squared\" measurement is 70 inches x 70 inches, which equals 4,900 inches squared.  Divide the total from step 2 (number of lb x 703) by the total from step 3 (inches squared): 105,450 ÷ 4,900 = 21.5. This is your BMI.  To calculate your BMI in metric measurements:    Measure your weight in kilograms (kg).  For this example, the weight is 70 kg.  Measure your height in meters (m). Then multiply that number by itself to get a measurement called \"meters squared.\"  So, for an adult who is 1.75 m tall, the \"meters squared\" measurement is 1.75 m x 1.75 " m, which equals 3.1 meters squared.  Divide the number of kilograms (your weight) by the meters squared number. In this example: 70 ÷ 3.1 = 22.6. This is your BMI.  What do the results mean?  BMI charts are used to see if you are underweight, normal weight, overweight, or obese. The following guidelines will be used:  Underweight: BMI less than 18.5.  Normal weight: BMI between 18.5 and 24.9.  Overweight: BMI between 25 and 29.9.  Obese: BMI of 30 or above.  BMI is a tool and cannot diagnose a condition. Talk with your health care provider about what your BMI means for you. Keep these notes in mind:  Weight includes fat and muscle. Someone with a muscular build, such as an athlete, may have a BMI that is higher than 24.9. In cases like these, BMI is not a correct measure of body fat.  If you have a BMI of 25 or higher, your provider may need to do more testing to find out if excess body fat is the cause.  BMI is measured the same way for males and females. Females usually have more body fat than males of the same height and weight.  Where to find more information  For more information about BMI, including tools to quickly find your BMI, go to:  Centers for Disease Control and Prevention: cdc.gov  American Heart Association: heart.org  National Heart, Lung, and Blood [place]: nhlbi.nih.gov  This information is not intended to replace advice given to you by your health care provider. Make sure you discuss any questions you have with your health care provider.  Document Revised: 09/07/2023 Document Reviewed: 08/31/2023  [employer] Patient Education © 2024 [employer]DASH Eating Plan  DASH stands for Dietary Approaches to Stop Hypertension. The DASH eating plan is a healthy eating plan that has been shown to:  Lower high blood pressure (hypertension).  Reduce your risk for type 2 diabetes, heart disease, and stroke.  Help with weight loss.  What are tips for following this plan?  Reading food labels  Check food labels  "for the amount of salt (sodium) per serving. Choose foods with less than 5 percent of the Daily Value (DV) of sodium. In general, foods with less than 300 milligrams (mg) of sodium per serving fit into this eating plan.  To find whole grains, look for the word \"whole\" as the first word in the ingredient list.  Shopping  Buy products labeled as \"low-sodium\" or \"no salt added.\"  Buy fresh foods. Avoid canned foods and pre-made or frozen meals.  Cooking  Try not to add salt when you cook. Use salt-free seasonings or herbs instead of table salt or sea salt. Check with your health care provider or pharmacist before using salt substitutes.  Do not vicente foods. Cook foods in healthy ways, such as baking, boiling, grilling, roasting, or broiling.  Cook using oils that are good for your heart. These include olive, canola, avocado, soybean, and sunflower oil.  Meal planning    Eat a balanced diet. This should include:  4 or more servings of fruits and 4 or more servings of vegetables each day. Try to fill half of your plate with fruits and vegetables.  6-8 servings of whole grains each day.  6 or less servings of lean meat, poultry, or fish each day. 1 oz is 1 serving. A 3 oz (85 g) serving of meat is about the same size as the palm of your hand. One egg is 1 oz (28 g).  2-3 servings of low-fat dairy each day. One serving is 1 cup (237 mL).  1 serving of nuts, seeds, or beans 5 times each week.  2-3 servings of heart-healthy fats. Healthy fats called omega-3 fatty acids are found in foods such as walnuts, flaxseeds, fortified milks, and eggs. These fats are also found in cold-water fish, such as sardines, salmon, and mackerel.  Limit how much you eat of:  Canned or prepackaged foods.  Food that is high in trans fat, such as fried foods.  Food that is high in saturated fat, such as fatty meat.  Desserts and other sweets, sugary drinks, and other foods with added sugar.  Full-fat dairy products.  Do not salt foods before " eating.  Do not eat more than 4 egg yolks a week.  Try to eat at least 2 vegetarian meals a week.  Eat more home-cooked food and less restaurant, buffet, and fast food.  Lifestyle  When eating at a restaurant, ask if your food can be made with less salt or no salt.  If you drink alcohol:  Limit how much you have to:  0-1 drink a day if you are female.  0-2 drinks a day if you are male.  Know how much alcohol is in your drink. In the [place] one drink is one 12 oz bottle of beer (355 mL), one 5 oz glass of wine (148 mL), or one 1½ oz glass of hard liquor (44 mL).  General information  Avoid eating more than 2,300 mg of salt a day. If you have hypertension, you may need to reduce your sodium intake to 1,500 mg a day.  Work with your provider to stay at a healthy body weight or lose weight. Ask what the best weight range is for you.  On most days of the week, get at least 30 minutes of exercise that causes your heart to beat faster. This may include walking, swimming, or biking.  Work with your provider or dietitian to adjust your eating plan to meet your specific calorie needs.  What foods should I eat?  Fruits  All fresh, dried, or frozen fruit. Canned fruits that are in their natural juice and do not have sugar added to them.  Vegetables  Fresh or frozen vegetables that are raw, steamed, roasted, or grilled. Low-sodium or reduced-sodium tomato and vegetable juice. Low-sodium or reduced-sodium tomato sauce and tomato paste. Low-sodium or reduced-sodium canned vegetables.  Grains  Whole-grain or whole-wheat bread. Whole-grain or whole-wheat pasta. Brown rice. Oatmeal. Quinoa. Bulgur. Whole-grain and low-sodium cereals. Ciara bread. Low-fat, low-sodium crackers. Whole-wheat flour tortillas.  Meats and other proteins  Skinless chicken or turkey. Ground chicken or turkey. Pork with fat trimmed off. Fish and seafood. Egg whites. Dried beans, peas, or lentils. Unsalted nuts, nut butters, and seeds. Unsalted canned beans. Lean  cuts of beef with fat trimmed off. Low-sodium, lean precooked or cured meat, such as sausages or meat loaves.  Dairy  Low-fat (1%) or fat-free (skim) milk. Reduced-fat, low-fat, or fat-free cheeses. Nonfat, low-sodium ricotta or cottage cheese. Low-fat or nonfat yogurt. Low-fat, low-sodium cheese.  Fats and oils  Soft margarine without trans fats. Vegetable oil. Reduced-fat, low-fat, or light mayonnaise and salad dressings (reduced-sodium). Canola, safflower, olive, avocado, soybean, and sunflower oils. Avocado.  Seasonings and condiments  Herbs. Spices. Seasoning mixes without salt.  Other foods  Unsalted popcorn and pretzels. Fat-free sweets.  The items listed above may not be all the foods and drinks you can have. Talk to a dietitian to learn more.  What foods should I avoid?  Fruits  Canned fruit in a light or heavy syrup. Fried fruit. Fruit in cream or butter sauce.  Vegetables  Creamed or fried vegetables. Vegetables in a cheese sauce. Regular canned vegetables that are not marked as low-sodium or reduced-sodium. Regular canned tomato sauce and paste that are not marked as low-sodium or reduced-sodium. Regular tomato and vegetable juices that are not marked as low-sodium or reduced-sodium. Pickles. Olives.  Grains  Baked goods made with fat, such as croissants, muffins, or some breads. Dry pasta or rice meal packs.  Meats and other proteins  Fatty cuts of meat. Ribs. Fried meat. Rodriguez. Bologna, salami, and other precooked or cured meats, such as sausages or meat loaves, that are not lean and low in sodium. Fat from the back of a pig (fatback). Bratwurst. Salted nuts and seeds. Canned beans with added salt. Canned or smoked fish. Whole eggs or egg yolks. Chicken or turkey with skin.  Dairy  Whole or 2% milk, cream, and half-and-half. Whole or full-fat cream cheese. Whole-fat or sweetened yogurt. Full-fat cheese. Nondairy creamers. Whipped toppings. Processed cheese and cheese spreads.  Fats and oils  Butter.  Stick margarine. Lard. Shortening. Ghee. Rodriguez fat. Tropical oils, such as coconut, palm kernel, or palm oil.  Seasonings and condiments  Onion salt, garlic salt, seasoned salt, table salt, and sea salt. Worcestershire sauce. Tartar sauce. Barbecue sauce. Teriyaki sauce. Soy sauce, including reduced-sodium soy sauce. Steak sauce. Canned and packaged gravies. Fish sauce. Oyster sauce. Cocktail sauce. Store-bought horseradish. Ketchup. Mustard. Meat flavorings and tenderizers. Bouillon cubes. Hot sauces. Pre-made or packaged marinades. Pre-made or packaged taco seasonings. Relishes. Regular salad dressings.  Other foods  Salted popcorn and pretzels.  The items listed above may not be all the foods and drinks you should avoid. Talk to a dietitian to learn more.  Where to find more information  National Heart, Lung, and Blood [place] (NHLBI): nhlbi.nih.gov  American Heart Association (AHA): heart.org  Academy of Nutrition and Dietetics: eatright.org  National Kidney Foundation (NKF): kidney.org  This information is not intended to replace advice given to you by your health care provider. Make sure you discuss any questions you have with your health care provider.  Document Revised: 01/04/2024 Document Reviewed: 01/04/2024  [employer] Patient Education © 2024 [employer]

## 2025-06-17 NOTE — PROGRESS NOTES
SUBJECTIVE:  Patient ID: Lars Rand is a 60 y.o. male is here today for follow-up.    Chief Complaint: Back pain  Chief Complaint   Patient presents with    SYMPTOM FOLLOW UP     Patient is here for follow up of his back pain.  He has not completed therapy or pain management b/c he states his insurance would not cover therapy or pain mgmt.   [facility] imaging       HPI  60-year-old gentleman that went to the operating room September 25, 2024 for a C3-7 ACDF.  Most recently we have been following for low back pain.  He describes constant across the low back back pain.  He does describe some intermittent numbness in the left thigh but nothing constant.  He denies any lower extremity radicular pain.  He has not done any conservative care for this issue.  He had a consultation with [facility] but has not scheduled any sort of follow-up.  He takes Percocet twice a day along with anti-inflammatories and muscle relaxers from his primary care doctor.  He is disabled.    The following portions of the patient's history were reviewed and updated as appropriate: allergies, current medications, past family history, past medical history, past social history, past surgical history and problem list.    OBJECTIVE:    Review of Systems   Musculoskeletal:  Positive for arthralgias and back pain.   All other systems reviewed and are negative.         Physical Exam  Constitutional:       General: He is awake.   Eyes:      Extraocular Movements: Extraocular movements intact.      Pupils: Pupils are equal, round, and reactive to light.   Neurological:      Motor: Motor strength is normal.     Deep Tendon Reflexes: Reflexes are normal and symmetric.   Psychiatric:         Speech: Speech normal.         Neurological Exam  Mental Status  Awake. Oriented to person, place and time. Speech is normal. Language is fluent with no aphasia. Attention and concentration are normal.    Cranial Nerves  CN I: Sense of smell is normal.  CN II: Right  normal visual field. Left normal visual field.  CN III, IV, VI: Extraocular movements intact bilaterally. Pupils equal round and reactive to light bilaterally.  CN V: Facial sensation is normal.  CN VII:  Right: There is no facial weakness.  Left: There is no facial weakness.  CN XI: Shoulder shrug strength is normal.  CN XII: Tongue midline without atrophy or fasciculations.    Motor  Normal muscle bulk throughout. Normal muscle tone. Strength is 5/5 throughout all four extremities.    Sensory  Sensation is intact to light touch, pinprick, vibration and proprioception in all four extremities.    Reflexes  Deep tendon reflexes are 2+ and symmetric in all four extremities.    Gait  Normal casual, toe, heel and tandem gait.      Independent Review of Radiographic Studies:       ASSESSMENT/PLAN:  MRI of the lumbar spine and plain x-rays show multilevel degenerative disc disease from L2-3, 3 4, 4 5, 5 S1.  He is got lumbar stenosis as well as a coronal scoliotic deformity.  These findings are likely responsible for his back pain.  Any consideration of surgery though would be a multilevel deformity correction and instrumented fusion.  He would have to exhaust all nonsurgical treatment options prior to that.  I be happy to see him again in the future on an as-needed basis.      1. Degeneration of intervertebral disc of lumbosacral region with discogenic back pain and lower extremity pain    2. Spinal stenosis, lumbar region, with neurogenic claudication    3. Other form of scoliosis of thoracolumbar spine    4. Chronic bilateral low back pain without sciatica    5. Former smoker    6. Overweight with body mass index (BMI) of 28 to 28.9 in adult        The patient's Body mass index is 28.66 kg/m².. BMI is above normal parameters. Recommendations include: educational material    Return if symptoms worsen or fail to improve.      Cj Daley MD

## 2025-07-08 DIAGNOSIS — M50.30 DEGENERATION OF CERVICAL INTERVERTEBRAL DISC: ICD-10-CM

## 2025-07-08 DIAGNOSIS — M48.02 SPINAL STENOSIS IN CERVICAL REGION: ICD-10-CM

## 2025-07-08 DIAGNOSIS — M05.79 RHEUMATOID ARTHRITIS INVOLVING MULTIPLE SITES WITH POSITIVE RHEUMATOID FACTOR: ICD-10-CM

## 2025-07-08 DIAGNOSIS — M50.30 DEGENERATIVE DISC DISEASE, CERVICAL: ICD-10-CM

## 2025-07-08 RX ORDER — OXYCODONE AND ACETAMINOPHEN 10; 325 MG/1; MG/1
1 TABLET ORAL EVERY 8 HOURS PRN
Qty: 90 TABLET | Refills: 0 | Status: SHIPPED | OUTPATIENT
Start: 2025-07-08 | End: 2025-07-10 | Stop reason: SDUPTHER

## 2025-07-10 ENCOUNTER — OFFICE VISIT (OUTPATIENT)
Dept: INTERNAL MEDICINE | Facility: CLINIC | Age: 60
End: 2025-07-10
Payer: MEDICARE

## 2025-07-10 VITALS
HEIGHT: 67 IN | WEIGHT: 181 LBS | SYSTOLIC BLOOD PRESSURE: 135 MMHG | HEART RATE: 62 BPM | BODY MASS INDEX: 28.41 KG/M2 | DIASTOLIC BLOOD PRESSURE: 82 MMHG | OXYGEN SATURATION: 99 % | TEMPERATURE: 98.2 F

## 2025-07-10 DIAGNOSIS — R53.82 CHRONIC FATIGUE: ICD-10-CM

## 2025-07-10 DIAGNOSIS — M50.30 DEGENERATIVE DISC DISEASE, CERVICAL: ICD-10-CM

## 2025-07-10 DIAGNOSIS — M05.79 RHEUMATOID ARTHRITIS INVOLVING MULTIPLE SITES WITH POSITIVE RHEUMATOID FACTOR: ICD-10-CM

## 2025-07-10 DIAGNOSIS — I10 PRIMARY HYPERTENSION: Primary | ICD-10-CM

## 2025-07-10 DIAGNOSIS — M48.02 SPINAL STENOSIS IN CERVICAL REGION: ICD-10-CM

## 2025-07-10 DIAGNOSIS — M50.30 DEGENERATION OF CERVICAL INTERVERTEBRAL DISC: ICD-10-CM

## 2025-07-10 RX ORDER — OXYCODONE AND ACETAMINOPHEN 10; 325 MG/1; MG/1
1 TABLET ORAL EVERY 8 HOURS PRN
Qty: 90 TABLET | Refills: 0 | Status: SHIPPED | OUTPATIENT
Start: 2025-07-10

## 2025-07-10 NOTE — PROGRESS NOTES
Subjective     Chief Complaint   Patient presents with    Pain       Pain      Patient's PMR from outside medical facility reviewed and noted.    Lars Rand is a 60 y.o. male who presents for a routine office visit.   He returns for follow-up of chronic neck pain and RA.  Neck pain dates back to an accident which occurred in 2022 .   his pain is stable, well controlled, and improved after surgery.  pain is stable and well controlled.      I have discussed with him the possibility of addiction, overdose, as well as the limited benefits and other risks of opioid use.  Patient reports a greater than 30% continuous improvement in functional capability on the 3 item PEG assessment scale since his initial assessment, his Edvin has been reviewed, and risk for diversion or abuse appears low.  The patient states that he has no side effects of his medication, does not appear oversedated, states that he is not modifying his own regimen, and that he is not taking any other illicit substances.  The patient has never had an overdose or needed a rescue medication we are aware of at this office.  A plan is in place discussed when initiating his narcotics for discontinuation should problems arise or pain improve.    Pain characteristics:  Current pain level : 3/10 as reported by patient.    Pain Description :  dull and ache. does not radiate and occurs several times per day.   Alleviating factors :   relaxation and pain medication  Aggravating factors : inactivity and weather changes  Associated Symptoms:  patient denies any problems    Limitations: This pain limits the patient from walking and doing several activities of daily living    PEG scale:  1. Pain on average over the Last week 3/10  2. Patient states that durring the past week that thier pain has interfered with his enjoyment of life 3/10  with 10 bieng complete interference.  3. The patient also states that during the past week that that his pain has  interfered with his general activity 3/10  with 10 bieng complete interference      Final Peg Score: 3    Reason for continuation: The patient reports that he has had increase in their quality of life, increased functional status and is able to do activities of daily living, and decreased pain overall due to taking the medication.  Patient continues to do well with the current treatment plan and states that he would like to continue with opioid therapy at this time.      Patient still complains of significant chronic fatigue.  We will go ahead and order a CBC, comprehensive metabolic panel, TSH, vitamin-D, vitamin B12, and free and total testosterone to evaluate this at this time.     Patient's hypertension remains stable and well-controlled at this time.    Opoid History:     Current MME total: 45    Objective Radiological Findings: Degenerative changes, most advanced at C5-6 and C6-7. On cervical spine xray, Possible partial fusion of the inferior sacroiliac joints on lumbar xray, positive RF    Trearment goals: reduced pain, Increased activities of daily living    History:   Duration of the patient's pain: since before 2010   Legal issues? NO   Current controlled medication and doses: percocet   Medications that have failed: nsaids, norco   Side effects of current medications: NONE   Satisfaction with treatment: YES   Prior Specialty Consultations: rhematology   Prior Phychiatric history: none   Comorbid conditions that may effect opiates: Obstructive sleep apnea    Functional Status:   Able to feed self: YES   Able to bathe self: YES   Able to use the toilet independently: YES   Able to dress self: YES   Able to get up from bed or chair without assistance: YES    Adverse Events:   Constipation: no   Lethargy: NO     Aberrant Behavior:   Over dose: NO   Run out of medications early: NO   Last UDS consistent: yes   Taking medications as prescribed: YES  Refills for prescriptions appropriate: YES  Lost rx/pills:  NO  Taking more medication than prescribed: NO  Are you receiving PAIN medications from other doctors: no  Are currently pregnant? NO  Recent ER visits: NO         Past Medical History:   Past Medical History:   Diagnosis Date    Arthritis     Cervical disc disorder 01/02/2022    Erectile dysfunction 04/2023    GERD (gastroesophageal reflux disease)     Heart attack     Hyperlipidemia     Hypertension     Low back pain     Lumbosacral disc disease     Degenerative Disk Disease    Sleep apnea     Visual impairment 2022    Rosie-Parkinson-White (WPW) pattern      Past Surgical History:  Past Surgical History:   Procedure Laterality Date    ANTERIOR CERVICAL DISCECTOMY W/ FUSION N/A 09/25/2024    Procedure: CERVICAL DISCECTOMY ANTERIOR WITH FUSION C4-5,5-6,6-7;  Surgeon: Cj Daley MD;  Location: [facility];  Service: Neurosurgery;  Laterality: N/A;    EPIDURAL BLOCK  02/2024    MULTIPLE TOOTH EXTRACTIONS      NECK SURGERY  9/25/2024    SPINE SURGERY  9/25/2024    Cervical spine    TONSILECTOMY, ADENOIDECTOMY, BILATERAL MYRINGOTOMY AND TUBES      TONSILLECTOMY  1971     Social History:  reports that he quit smoking about 11 years ago. His smoking use included cigarettes. He started smoking about 48 years ago. He has a 37 pack-year smoking history. He has been exposed to tobacco smoke. He has never used smokeless tobacco. He reports current alcohol use of about 4.0 - 5.0 standard drinks of alcohol per week. He reports that he does not use drugs.    Family History: family history includes Arthritis in his father and mother; Dementia in his mother; Diabetes in his father and mother; Heart failure in his mother; Hypertension in his brother and father; Leukemia in his father; Stroke in his mother; Thyroid cancer in his sister.      Allergies:  No Known Allergies  Medications:  Prior to Admission medications    Medication Sig Start Date End Date Taking? Authorizing Provider   atenolol (TENORMIN) 25 MG tablet Take 1  tablet by mouth Daily. 8/14/24  Yes Óscar Lehman MD   atorvastatin (LIPITOR) 20 MG tablet Take 1 tablet by mouth Daily. 1/16/25  Yes Óscar Lehman MD   cyclobenzaprine (FLEXERIL) 10 MG tablet Take 1 tablet by mouth 3 (Three) Times a Day As Needed for Muscle Spasms. 9/26/24  Yes Yang Granados APRN   diclofenac (VOLTAREN) 75 MG EC tablet Take 1 tablet by mouth Every 12 (Twelve) Hours. 2/7/25  Yes Lindsay Melgar MD   folic acid (FOLVITE) 1 MG tablet Take 1 tablet by mouth Daily. 8/14/24  Yes Óscar Lehman MD   hydroxychloroquine (PLAQUENIL) 200 MG tablet Take 2 tablets by mouth Daily. Indications: Rheumatoid Arthritis 8/14/24  Yes Óscar Lehman MD   ibuprofen (ADVIL,MOTRIN) 800 MG tablet Take 1 tablet by mouth Every 8 (Eight) Hours As Needed for Moderate Pain. 2/6/25  Yes Óscar Lehman MD   naloxone (NARCAN) 4 MG/0.1ML nasal spray Call 911. Don't prime. [place] in 1 nostril for overdose. Repeat in 2-3 minutes in other nostril if no or minimal breathing/responsiveness. 9/26/24  Yes Yang Granados APRN   oxyCODONE-acetaminophen (Percocet)  MG per tablet Take 1 tablet by mouth Every 8 (Eight) Hours As Needed for Moderate Pain. 7/8/25  Yes Óscar Lehman MD   sildenafil (VIAGRA) 100 MG tablet Take 1 tablet by mouth Daily As Needed for Erectile Dysfunction.  Patient taking differently: Take 1 tablet by mouth As Needed for Erectile Dysfunction. 6/3/25  Yes Óscar Lehman MD   traZODone (DESYREL) 50 MG tablet Take 1 tablet by mouth Every Night. 8/14/24  Yes Óscar Lehman MD       NILDA:      PHQ:  Little interest or pleasure in doing things? Not at all   Feeling down, depressed, or hopeless? Not at all   PHQ-2 Total Score 0           Review of systems   negative unless otherwise specified above in HPI    Objective     Vital Signs: /82 (BP Location: Right arm, Patient Position: Sitting, Cuff Size: Adult)    "Pulse 62   Temp 98.2 °F (36.8 °C) (Infrared)   Ht 170.2 cm (67\")   Wt 82.1 kg (181 lb)   SpO2 99%   BMI 28.35 kg/m²     Physical Exam  Vitals and nursing note reviewed.   Constitutional:       General: He is not in acute distress.     Appearance: Normal appearance.   HENT:      Head: Normocephalic.   Eyes:      Extraocular Movements: Extraocular movements intact.      Pupils: Pupils are equal, round, and reactive to light.   Cardiovascular:      Rate and Rhythm: Normal rate and regular rhythm.      Heart sounds: Normal heart sounds. No murmur heard.  Pulmonary:      Effort: Pulmonary effort is normal. No respiratory distress.      Breath sounds: Normal breath sounds. No rhonchi or rales.   Abdominal:      General: Bowel sounds are normal. There is no distension.      Palpations: Abdomen is soft.   Neurological:      General: No focal deficit present.      Mental Status: He is alert.              Results Reviewed:  Glucose   Date Value Ref Range Status   09/26/2024 102 (H) 65 - 99 mg/dL Final     BUN   Date Value Ref Range Status   09/26/2024 13 6 - 20 mg/dL Final   06/17/2022 39 (H) 6 - 20 mg/dL Final     Creatinine   Date Value Ref Range Status   09/26/2024 0.98 0.76 - 1.27 mg/dL Final   06/17/2022 1.23 0.80 - 1.30 mg/dL Final     Sodium   Date Value Ref Range Status   09/26/2024 136 136 - 145 mmol/L Final   06/17/2022 134 (L) 136 - 144 mmol/L Final     Potassium   Date Value Ref Range Status   09/26/2024 4.2 3.5 - 5.2 mmol/L Final   06/17/2022 4.6 3.5 - 5.1 mmol/L Final     Chloride   Date Value Ref Range Status   09/26/2024 102 98 - 107 mmol/L Final   06/17/2022 105 100 - 110 mmol/L Final     CO2   Date Value Ref Range Status   09/26/2024 27.0 22.0 - 29.0 mmol/L Final     Calcium   Date Value Ref Range Status   09/26/2024 8.3 (L) 8.6 - 10.5 mg/dL Final   06/17/2022 8.6 (L) 8.9 - 10.3 mg/dL Final     ALT (SGPT)   Date Value Ref Range Status   09/04/2024 16 0 - 44 IU/L Final   06/16/2022 11 <=41 U/L Final "     AST (SGOT)   Date Value Ref Range Status   09/04/2024 17 0 - 40 IU/L Final   06/16/2022 13 <=40 U/L Final     WBC   Date Value Ref Range Status   09/26/2024 7.89 3.40 - 10.80 10*3/mm3 Final   09/04/2024 6.2 3.4 - 10.8 x10E3/uL Final     Hematocrit   Date Value Ref Range Status   09/26/2024 34.4 (L) 37.5 - 51.0 % Final     Platelets   Date Value Ref Range Status   09/26/2024 175 140 - 450 10*3/mm3 Final     Triglycerides   Date Value Ref Range Status   09/04/2024 292 (H) 0 - 149 mg/dL Final     HDL Cholesterol   Date Value Ref Range Status   09/04/2024 46 >39 mg/dL Final     LDL Chol Calc (NIH)   Date Value Ref Range Status   09/04/2024 70 0 - 99 mg/dL Final     Hemoglobin A1C   Date Value Ref Range Status   09/04/2024 5.5 4.8 - 5.6 % Final     Comment:              Prediabetes: 5.7 - 6.4           Diabetes: >6.4           Glycemic control for adults with diabetes: <7.0               Procedure   Procedures       Assessment / Plan     Assessment/Plan:   Diagnosis Plan   1. Primary hypertension        2. Degeneration of cervical intervertebral disc  oxyCODONE-acetaminophen (Percocet)  MG per tablet      3. Degenerative disc disease, cervical  oxyCODONE-acetaminophen (Percocet)  MG per tablet      4. Spinal stenosis in cervical region  oxyCODONE-acetaminophen (Percocet)  MG per tablet      5. Rheumatoid arthritis involving multiple sites with positive rheumatoid factor  oxyCODONE-acetaminophen (Percocet)  MG per tablet      6. Chronic fatigue  CBC and Differential    Comprehensive metabolic panel    Testosterone, free, total    Vitamin B12    Vitamin D 1,25 dihydroxy    TSH            Return in about 4 weeks (around 8/7/2025) for Recheck. unless patient needs to be seen sooner or acute issues arise.    As part of this patient's treatment plan, I am prescribing controlled substances. The patient has been made aware of appropriate use of such medications, including potential risk of somnolence,  limited ability to drive and /or work safely, and potential for dependence or overdose. It has also been made clear that these medications are for use by this patient only, without concomitant use of alcohol, Mariajuana,  or other substances unless prescribed.  Patient has been advised that PRN or as needed pain medication allows the patient to skip doses if not needed, but that the medication is not to be taken more often or at higher doses than prescribed.    Patient is encouraged to keep their controlled substances in a lockbox, and safe disposal of the medication was discussed including the clinic, drop boxes at police stations and Pharmacies.    Patient has completed prescribing agreement detailing terms of continued prescribing of controlled substances, including monitoring KEVIN reports, urine drug screening, and pill counts if necessary. The patient is aware that inappropriate use will result in cessation of prescribing such medications, and possible termination from this practice.    History and physical exam exhibit continued safe and appropriate use of controlled substances.    1.  Controlled substance abuse agreement discussed and copy in record: YES  2.  Discussed:   Risk of addiction: YES   Specific risk of medications:YES   Side effects of medications: YES   Reasonable expectations of the medication: YES  3.  Treatment Objectives:   Discussed management of constipation: YES   Discussed management of other side effects: YES  4.  eKASPER:     KEVIN report has been reviewed by: Óscar Lehman MD on 07/10/25 in the PDMD in the electronic medical record.   Results/Date of last KEVIN:  appropriate  5.  Results/Date of last drug screen:  appropriate  6.  Follow up plan:    Follow Up in One Months Time              Continue on current medications as directed    EMR Dictation/Transcription disclaimer:   Some of this note may be an electronic transcription/translation of spoken language to printed  text. The electronic translation of spoken language may permit erroneous, or at times, nonsensical words or phrases to be inadvertently transcribed; Although I have reviewed the note for such errors, some may still exist.     I have discussed the patient results/orders and and plan/recommendation with them at today's visit.      Signed by:    Óscar Lehman MD Date: 07/10/25

## 2025-07-14 LAB
1,25(OH)2D SERPL-MCNC: 47.1 PG/ML (ref 24.8–81.5)
ALBUMIN SERPL-MCNC: 4.2 G/DL (ref 3.8–4.9)
ALP SERPL-CCNC: 73 IU/L (ref 44–121)
ALT SERPL-CCNC: 19 IU/L (ref 0–44)
AST SERPL-CCNC: 21 IU/L (ref 0–40)
BASOPHILS # BLD AUTO: 0 X10E3/UL (ref 0–0.2)
BASOPHILS NFR BLD AUTO: 1 %
BILIRUB SERPL-MCNC: 0.3 MG/DL (ref 0–1.2)
BUN SERPL-MCNC: 16 MG/DL (ref 8–27)
BUN/CREAT SERPL: 16 (ref 10–24)
CALCIUM SERPL-MCNC: 9.4 MG/DL (ref 8.6–10.2)
CHLORIDE SERPL-SCNC: 101 MMOL/L (ref 96–106)
CO2 SERPL-SCNC: 23 MMOL/L (ref 20–29)
CREAT SERPL-MCNC: 0.99 MG/DL (ref 0.76–1.27)
EGFRCR SERPLBLD CKD-EPI 2021: 87 ML/MIN/1.73
EOSINOPHIL # BLD AUTO: 0.1 X10E3/UL (ref 0–0.4)
EOSINOPHIL NFR BLD AUTO: 2 %
ERYTHROCYTE [DISTWIDTH] IN BLOOD BY AUTOMATED COUNT: 13 % (ref 11.6–15.4)
GLOBULIN SER CALC-MCNC: 2.4 G/DL (ref 1.5–4.5)
GLUCOSE SERPL-MCNC: 128 MG/DL (ref 70–99)
HCT VFR BLD AUTO: 42.5 % (ref 37.5–51)
HGB BLD-MCNC: 13.3 G/DL (ref 13–17.7)
IMM GRANULOCYTES # BLD AUTO: 0 X10E3/UL (ref 0–0.1)
IMM GRANULOCYTES NFR BLD AUTO: 0 %
LYMPHOCYTES # BLD AUTO: 2 X10E3/UL (ref 0.7–3.1)
LYMPHOCYTES NFR BLD AUTO: 36 %
MCH RBC QN AUTO: 29.7 PG (ref 26.6–33)
MCHC RBC AUTO-ENTMCNC: 31.3 G/DL (ref 31.5–35.7)
MCV RBC AUTO: 95 FL (ref 79–97)
MONOCYTES # BLD AUTO: 0.5 X10E3/UL (ref 0.1–0.9)
MONOCYTES NFR BLD AUTO: 9 %
NEUTROPHILS # BLD AUTO: 2.8 X10E3/UL (ref 1.4–7)
NEUTROPHILS NFR BLD AUTO: 52 %
PLATELET # BLD AUTO: 204 X10E3/UL (ref 150–450)
POTASSIUM SERPL-SCNC: 4.5 MMOL/L (ref 3.5–5.2)
PROT SERPL-MCNC: 6.6 G/DL (ref 6–8.5)
RBC # BLD AUTO: 4.48 X10E6/UL (ref 4.14–5.8)
SODIUM SERPL-SCNC: 139 MMOL/L (ref 134–144)
TESTOST FREE SERPL-MCNC: 5.2 PG/ML (ref 6.6–18.1)
TESTOST SERPL-MCNC: 520 NG/DL (ref 264–916)
VIT B12 SERPL-MCNC: 970 PG/ML (ref 232–1245)
WBC # BLD AUTO: 5.4 X10E3/UL (ref 3.4–10.8)

## 2025-08-11 ENCOUNTER — OFFICE VISIT (OUTPATIENT)
Dept: INTERNAL MEDICINE | Facility: CLINIC | Age: 60
End: 2025-08-11
Payer: MEDICARE

## 2025-08-11 VITALS
HEART RATE: 49 BPM | HEIGHT: 67 IN | BODY MASS INDEX: 27.94 KG/M2 | TEMPERATURE: 98 F | OXYGEN SATURATION: 96 % | WEIGHT: 178 LBS | DIASTOLIC BLOOD PRESSURE: 82 MMHG | SYSTOLIC BLOOD PRESSURE: 122 MMHG

## 2025-08-11 DIAGNOSIS — M48.02 SPINAL STENOSIS IN CERVICAL REGION: ICD-10-CM

## 2025-08-11 DIAGNOSIS — M50.30 DEGENERATION OF CERVICAL INTERVERTEBRAL DISC: ICD-10-CM

## 2025-08-11 DIAGNOSIS — M50.30 DEGENERATIVE DISC DISEASE, CERVICAL: ICD-10-CM

## 2025-08-11 DIAGNOSIS — M05.79 RHEUMATOID ARTHRITIS INVOLVING MULTIPLE SITES WITH POSITIVE RHEUMATOID FACTOR: ICD-10-CM

## 2025-08-11 PROCEDURE — 1160F RVW MEDS BY RX/DR IN RCRD: CPT | Performed by: FAMILY MEDICINE

## 2025-08-11 PROCEDURE — 3079F DIAST BP 80-89 MM HG: CPT | Performed by: FAMILY MEDICINE

## 2025-08-11 PROCEDURE — 99213 OFFICE O/P EST LOW 20 MIN: CPT | Performed by: FAMILY MEDICINE

## 2025-08-11 PROCEDURE — 3074F SYST BP LT 130 MM HG: CPT | Performed by: FAMILY MEDICINE

## 2025-08-11 PROCEDURE — 1125F AMNT PAIN NOTED PAIN PRSNT: CPT | Performed by: FAMILY MEDICINE

## 2025-08-11 PROCEDURE — 1159F MED LIST DOCD IN RCRD: CPT | Performed by: FAMILY MEDICINE

## 2025-08-11 RX ORDER — OXYCODONE AND ACETAMINOPHEN 10; 325 MG/1; MG/1
1 TABLET ORAL EVERY 8 HOURS PRN
Qty: 90 TABLET | Refills: 0 | Status: SHIPPED | OUTPATIENT
Start: 2025-08-11

## 2025-08-11 RX ORDER — IBUPROFEN 800 MG/1
800 TABLET, FILM COATED ORAL EVERY 8 HOURS PRN
Qty: 270 TABLET | Refills: 3 | Status: SHIPPED | OUTPATIENT
Start: 2025-08-11

## (undated) DEVICE — GLV SURG DERMASSURE GRN LF PF 7.0

## (undated) DEVICE — GLV SURG DERMASSURE GRN LF PF 8.0

## (undated) DEVICE — PROXIMATE RH ROTATING HEAD SKIN STAPLERS (35 WIDE) CONTAINS 35 STAINLESS STEEL STAPLES: Brand: PROXIMATE

## (undated) DEVICE — PK SPINE CERV ANT 30

## (undated) DEVICE — THE STERILE LIGHT HANDLE COVER IS USED WITH STERIS SURGICAL LIGHTING AND VISUALIZATION SYSTEMS.

## (undated) DEVICE — NEEDLE, QUINCKE, 18GX3.5": Brand: MEDLINE

## (undated) DEVICE — TOTAL TRAY, 16FR 10ML SIL FOLEY, URN: Brand: MEDLINE

## (undated) DEVICE — DISPOSABLE IRRIGATION CASSETTE: Brand: CORE

## (undated) DEVICE — LIMB HOLDER, WRIST/ANKLE: Brand: DEROYAL

## (undated) DEVICE — ANTIBACTERIAL UNDYED BRAIDED (POLYGLACTIN 910), SYNTHETIC ABSORBABLE SUTURE: Brand: COATED VICRYL

## (undated) DEVICE — GLV SURG BIOGEL LTX PF 8

## (undated) DEVICE — CLTH CLENS READYCLEANSE PERI CARE PK/5

## (undated) DEVICE — ELECTRD BLD EZ CLN MOD XLNG 2.75IN

## (undated) DEVICE — PACK,UNIVERSAL,NO GOWNS: Brand: MEDLINE

## (undated) DEVICE — UTILITY MARKER W/MED LABELS: Brand: MEDLINE

## (undated) DEVICE — HALTR TRACT HD CERV STD UNIV

## (undated) DEVICE — SUT MNCRYL 4/0 PS2 27IN UD MCP426H

## (undated) DEVICE — PIN DISTRACT TI 14MM STRL

## (undated) DEVICE — VAGINAL PREP TRAY: Brand: MEDLINE INDUSTRIES, INC.

## (undated) DEVICE — CONN FLX BREATHE CIRCT

## (undated) DEVICE — 5.0MM BARREL STRAIGHT FLUTE

## (undated) DEVICE — EMG TUBE 8229708 NIM TRIVANTAGE 8.0MM ID: Brand: NIM TRIVANTAGE™

## (undated) DEVICE — THE STERILE THE STERIS STERILE CAMERA HANDLE COVERS ARE DESIGNED FOR HARMONYAIR 4K CAMERA MODULE, AND PROVIDE STERILE CONTROL THAT ALLOW FOR INCREASING AND DECREASING ILLUMINATION THROUGH SEVEN INTENSITY LEVELS.

## (undated) DEVICE — DRP C/ARMOR

## (undated) DEVICE — SPONGE,DISSECTOR,K,XRAY,9/16"X1/4",STRL: Brand: MEDLINE

## (undated) DEVICE — TRAP FLD MINIVAC MEGADYNE 100ML

## (undated) DEVICE — SPONGE,DISSECTOR,ROUND CHERRY,XR,ST,5/PK: Brand: MEDLINE

## (undated) DEVICE — PAD,NON-ADHERENT,3X8,STERILE,LF,1/PK: Brand: MEDLINE